# Patient Record
Sex: FEMALE | Race: WHITE | NOT HISPANIC OR LATINO | Employment: OTHER | URBAN - METROPOLITAN AREA
[De-identification: names, ages, dates, MRNs, and addresses within clinical notes are randomized per-mention and may not be internally consistent; named-entity substitution may affect disease eponyms.]

---

## 2017-02-07 ENCOUNTER — HISTORICAL (OUTPATIENT)
Dept: ADMINISTRATIVE | Facility: HOSPITAL | Age: 67
End: 2017-02-07

## 2017-03-08 LAB — ISTAT CREATININE: 0.9

## 2017-03-22 ENCOUNTER — HISTORICAL (OUTPATIENT)
Dept: ADMINISTRATIVE | Facility: HOSPITAL | Age: 67
End: 2017-03-22

## 2017-03-22 LAB
AMYLASE SERPL-CCNC: 78 U/L (ref 28–100)
BASOPHILS NFR BLD: 0.1 K/UL (ref 0–0.2)
BASOPHILS NFR BLD: 0.9 %
CALCIUM SERPL-MCNC: 9.1 MG/DL (ref 7.7–10.4)
CHLORIDE: 104 MMOL/L (ref 98–110)
CO2 SERPL-SCNC: 27 MMOL/L (ref 22.8–31.6)
EOSINOPHIL NFR BLD: 0.5 K/UL (ref 0–0.7)
EOSINOPHIL NFR BLD: 7.1 %
ERYTHROCYTE [DISTWIDTH] IN BLOOD BY AUTOMATED COUNT: 13 % (ref 11.7–14.9)
GLUCOSE: 95 MG/DL (ref 70–99)
GRAN #: 4.1 K/UL (ref 1.4–6.5)
GRAN%: 60.5 %
HCT VFR BLD AUTO: 42.3 % (ref 36–48)
HGB BLD-MCNC: 13.6 G/DL (ref 12–15)
IMMATURE GRANS (ABS): 0 K/UL (ref 0–1)
IMMATURE GRANULOCYTES: 0.3 %
LYMPH #: 1.6 K/UL (ref 1.2–3.4)
LYMPH%: 23.5 %
MCH RBC QN AUTO: 28.8 PG (ref 25–35)
MCHC RBC AUTO-ENTMCNC: 32.2 G/DL (ref 31–36)
MCV RBC AUTO: 89.6 FL (ref 79–98)
MONO #: 0.5 K/UL (ref 0.1–0.6)
MONO%: 7.7 %
NUCLEATED RBCS: 0 %
PLATELET # BLD AUTO: 281 K/UL (ref 140–440)
PMV BLD AUTO: 10.5 FL (ref 8.8–12.7)
POTASSIUM SERPL-SCNC: 4.3 MMOL/L (ref 3.5–5)
RBC # BLD AUTO: 4.72 M/UL (ref 3.5–5.5)
SODIUM: 140 MMOL/L (ref 134–144)
WBC # BLD AUTO: 6.7 K/UL (ref 5–10)

## 2017-05-11 ENCOUNTER — TELEPHONE (OUTPATIENT)
Dept: FAMILY MEDICINE | Facility: CLINIC | Age: 67
End: 2017-05-11

## 2017-05-16 ENCOUNTER — TELEPHONE (OUTPATIENT)
Dept: FAMILY MEDICINE | Facility: CLINIC | Age: 67
End: 2017-05-16

## 2017-06-15 RX ORDER — ROPINIROLE 2 MG/1
TABLET, FILM COATED ORAL
Qty: 60 TABLET | Refills: 2 | Status: SHIPPED | OUTPATIENT
Start: 2017-06-15 | End: 2017-08-01

## 2017-07-06 RX ORDER — CITALOPRAM 20 MG/1
TABLET, FILM COATED ORAL
Qty: 30 TABLET | Refills: 2 | Status: SHIPPED | OUTPATIENT
Start: 2017-07-06 | End: 2017-08-02

## 2017-07-06 RX ORDER — IBUPROFEN 600 MG/1
TABLET ORAL
Qty: 60 TABLET | Refills: 2 | Status: SHIPPED | OUTPATIENT
Start: 2017-07-06 | End: 2017-08-02 | Stop reason: ALTCHOICE

## 2017-08-01 RX ORDER — HYDROCODONE BITARTRATE AND ACETAMINOPHEN 5; 325 MG/1; MG/1
1 TABLET ORAL EVERY 12 HOURS PRN
COMMUNITY
Start: 2016-02-05 | End: 2017-08-02

## 2017-08-01 RX ORDER — ROPINIROLE 2 MG/1
2 TABLET, FILM COATED ORAL 2 TIMES DAILY
COMMUNITY
End: 2017-08-02 | Stop reason: DRUGHIGH

## 2017-08-01 RX ORDER — CYCLOBENZAPRINE HCL 5 MG
5 TABLET ORAL 3 TIMES DAILY PRN
COMMUNITY
End: 2017-09-11 | Stop reason: SDUPTHER

## 2017-08-02 ENCOUNTER — OFFICE VISIT (OUTPATIENT)
Dept: FAMILY MEDICINE | Facility: CLINIC | Age: 67
End: 2017-08-02
Payer: MEDICARE

## 2017-08-02 VITALS
DIASTOLIC BLOOD PRESSURE: 77 MMHG | BODY MASS INDEX: 30.59 KG/M2 | HEART RATE: 77 BPM | WEIGHT: 155.81 LBS | SYSTOLIC BLOOD PRESSURE: 129 MMHG | HEIGHT: 60 IN

## 2017-08-02 DIAGNOSIS — G25.81 RLS (RESTLESS LEGS SYNDROME): ICD-10-CM

## 2017-08-02 DIAGNOSIS — M72.2 PLANTAR FASCIITIS: Primary | ICD-10-CM

## 2017-08-02 DIAGNOSIS — R21 RASH AND NONSPECIFIC SKIN ERUPTION: ICD-10-CM

## 2017-08-02 PROBLEM — M51.26 HERNIATION OF LUMBAR INTERVERTEBRAL DISC WITHOUT MYELOPATHY: Status: ACTIVE | Noted: 2017-08-02

## 2017-08-02 PROBLEM — G47.00 INSOMNIA: Status: ACTIVE | Noted: 2017-08-02

## 2017-08-02 PROBLEM — N20.0 CALCULUS OF KIDNEY: Status: ACTIVE | Noted: 2017-08-02

## 2017-08-02 PROBLEM — M85.80 OSTEOPENIA: Status: ACTIVE | Noted: 2017-08-02

## 2017-08-02 PROBLEM — R19.00 ABDOMINAL MASS: Status: ACTIVE | Noted: 2017-08-02

## 2017-08-02 PROBLEM — K21.9 GASTROESOPHAGEAL REFLUX DISEASE: Status: ACTIVE | Noted: 2017-08-02

## 2017-08-02 PROBLEM — Z78.9 NON-SMOKER: Status: ACTIVE | Noted: 2017-08-02

## 2017-08-02 PROBLEM — E78.5 HYPERLIPIDEMIA: Status: ACTIVE | Noted: 2017-08-02

## 2017-08-02 PROCEDURE — 99213 OFFICE O/P EST LOW 20 MIN: CPT | Mod: ,,, | Performed by: INTERNAL MEDICINE

## 2017-08-02 PROCEDURE — 1159F MED LIST DOCD IN RCRD: CPT | Mod: ,,, | Performed by: INTERNAL MEDICINE

## 2017-08-02 RX ORDER — TRIAMCINOLONE ACETONIDE 5 MG/G
CREAM TOPICAL 2 TIMES DAILY
Qty: 30 G | Refills: 1 | Status: SHIPPED | OUTPATIENT
Start: 2017-08-02 | End: 2020-06-11 | Stop reason: SDUPTHER

## 2017-08-02 RX ORDER — MELOXICAM 15 MG/1
1 TABLET ORAL DAILY
COMMUNITY
Start: 2017-07-08 | End: 2017-08-02 | Stop reason: SDUPTHER

## 2017-08-02 RX ORDER — MELOXICAM 15 MG/1
15 TABLET ORAL DAILY
Qty: 30 TABLET | Refills: 1 | Status: SHIPPED | OUTPATIENT
Start: 2017-08-02 | End: 2017-11-28 | Stop reason: SDUPTHER

## 2017-08-02 RX ORDER — ROPINIROLE 4 MG/1
4 TABLET, FILM COATED ORAL 2 TIMES DAILY
Qty: 60 TABLET | Refills: 11 | Status: SHIPPED | OUTPATIENT
Start: 2017-08-02 | End: 2018-04-24 | Stop reason: SDUPTHER

## 2017-08-02 NOTE — PATIENT INSTRUCTIONS
Plantar Fasciitis  Plantar fasciitis is a painful swelling of the plantar fascia. The plantar fascia is a thick, fibrous layer of tissue. It covers the bones on the bottom of your foot. And it supports the foot bones in an arched position.  This can happen gradually or suddenly. It usually affects one foot at a time. Heel pain can be sharp, like a knife sticking into the bottom of your foot. You may feel pain after exercising, long-distance jogging, stair climbing, long periods of standing, or after standing up.  Risk factors include: non-active lifestyle, arthritis, diabetes, obesity or recent weight gain, flat foot, high arch. Wearing high heels, loose shoes, or shoes with poor arch support for long periods of time adds to the risk. This problem is commonly found in runners and dancers. It also found in people who stand on hard surfaces for long periods of time.  Foot pain from this condition is usually worse in the morning. But it improves with walking. By the end of the day there may be a dull aching. Treatment requires short-term rest and controlling swelling. It may take up to 9 months before all symptoms go away. Rarely, a steroid injection into the foot, or surgery, may be needed.  Home care  · If you are overweight, lose weight to help healing.  · Choose supportive shoes with good arch support and shock absorbency. Replace athletic shoes when they become worn out. Dont walk or run barefoot.  · Premade or custom-fitted shoe inserts may be helpful. Inserts made of silicone seem to be the most effective. Custom-made inserts can be provided by a podiatrist or foot specialist, physical therapist, or orthopedist.  · Premade or custom-made night splints keep the heel stretched out while you sleep. They may prevent morning pain.  · Avoid activities that stress the feet: jogging, prolonged standing or walking, contact sports, etc.  · First thing in the morning and before sports, stretch the bottom of your feet.  Gently flex your ankle so the toes move toward your knee.  · Icing may help control heel pain. Apply an ice pack to the heel for 10-20 minutes as a preventive. Or ice your heel after a severe flare-up of symptoms. You may repeat this every 1-2 hours as needed.  · You may use over-the-counter pain medicine to control pain, unless another medicine was prescribed. Anti-inflammatory pain medicines, such as ibuprofen or naproxen, may work better than acetaminophen. If you have chronic liver or kidney disease or ever had a stomach ulcer or GI bleeding, talk with your healthcare provider before using these medicines.  Follow-up care  Follow up with your healthcare provider, physical therapist, or podiatrist or foot specialist as advised.  Call for an appointment if pain worsens or there is no relief after a few weeks of home treatment. Shoe inserts, a night splint, or a special boot may be required.  If X-rays were taken, you will be told of any new findings that may affect your care.  When to seek medical advice  Call your healthcare provider right away if any of these occur:  · Foot swelling  · Redness with increasing pain  Date Last Reviewed: 11/21/2015  © 2453-7071 [employer]. [address] [place] [address]. All rights reserved. This information is not intended as a substitute for professional medical care. Always follow your healthcare professional's instructions.        Treating Plantar Fasciitis  First, your healthcare provider tries to determine the cause of your problem in order to suggest ways to relieve pain. If your pain is due to poor foot mechanics, custom-made shoe inserts (orthoses) may help.    Reduce symptoms  · To relieve mild symptoms, try aspirin, ibuprofen, or other medicines as directed. Rubbing ice on the affected area may also help.  · To reduce severe pain and swelling, your healthcare provider may prescribe pills or injections or a walking cast in some instances.  Physical therapy, such as ultrasound or a daily stretching program, may also be recommended. Surgery is rarely required.  · To reduce symptoms caused by poor foot mechanics, your foot may be taped. This supports the arch and temporarily controls movement. Night splints may also help by stretching the fascia.  Control movement  If taping helps, your healthcare provider may prescribe orthoses. Built from plaster casts of your feet, these inserts control the way your foot moves. As a result, your symptoms should go away.  Reduce overuse  Every time your foot strikes the ground, the plantar fascia is stretched. You can reduce the strain on the plantar fascia and the possibility of overuse by following these suggestions:  · Lose any excess weight.  · Avoid running on hard or uneven ground.  · Use orthoses at all times in your shoes and house slippers.  If surgery is needed  Your healthcare provider may consider surgery if other types of treatment don't control your pain. During surgery, the plantar fascia is partially cut to release tension. As you heal, fibrous tissue fills the space between the heel bone and the plantar fascia.   Date Last Reviewed: 10/14/2015  © 6533-9017 [employer]. [address] [place] [address], [place] [address]. All rights reserved. This information is not intended as a substitute for professional medical care. Always follow your healthcare professional's instructions.

## 2017-08-02 NOTE — PROGRESS NOTES
Subjective:       Patient ID: Vesta RIOS Cousin is a 66 y.o. female.    Chief Complaint: Plantar Fasciitis    Mrs. Cruz is a 66-year-old  female who comes for evaluation. She complains of pain in the left heel. She is known to have plantar's fasciitis. She has tried some treatment in past without much relief. She recently went to urgent care center and was prescribed meloxicam with some relief. She also had an x-ray done which had shown heel spur  but no evidence of fracture or any other abnormality.   Patient also has chronic restless leg leg symptoms. She takes Requip 2 mg twice a day without much relief. She is wondering if dosage can be increased. Thus far she has not been diagnosed with iron deficiency anemia or diminished ferritin levels. It is unclear if she has sleep apnea.    Patient also seems to have a rash in the left upper extremity on the dorsum. She states that she was exposed to poison ivy 3-4 months ago and after that the rash has not gone away. She has intermittent itching. No fever. No similar rash elsewhere in the body. There are no pustules.          Foot Injury    The pain is present in the left foot. The quality of the pain is described as shooting and burning. The pain is at a severity of 4/10. The pain is moderate. The pain has been intermittent since onset. Associated symptoms include an inability to bear weight. The symptoms are aggravated by weight bearing. She has tried NSAIDs for the symptoms.   Rash   This is a new problem. The current episode started more than 1 month ago. The problem is unchanged. The affected locations include the left arm. The rash is characterized by redness and itchiness. She was exposed to plant contact. Pertinent negatives include no congestion, cough, diarrhea, eye pain, fatigue, fever or shortness of breath. Past treatments include topical steroids (Over-the-counter medications). The treatment provided no relief. There is no history of allergies,  asthma, eczema or varicella.       Past Medical History:   Diagnosis Date    Abdominal pain, acute     Abdominal wall mass of epigastric region     Acid reflux     Acute low back pain     Annual physical exam     Anxiety     Bilateral shoulder pain     Body mass index (bmi) 31.0-31.9, adult     Cervical spondylosis     Chronic right shoulder pain     Decreased GFR     Hearing aid worn     bilateral    History of ulcerative colitis     HNP (herniated nucleus pulposus), lumbar     Hyperlipidemia     Insomnia     Kidney stone     Left-sided low back pain without sciatica     Non-smoker     Osteoarthritis     Osteoarthritis of left glenohumeral joint     Osteopenia     Osteoporosis screening     Palpable abdominal mass     Peripheral neuropathy     Plantar fasciitis of left foot     RLS (restless legs syndrome)     Screening for colorectal cancer     Tear of right rotator cuff     Visit for screening mammogram      Social History     Social History    Marital status:      Spouse name: N/A    Number of children: N/A    Years of education: N/A     Occupational History    Not on file.     Social History Main Topics    Smoking status: Never Smoker    Smokeless tobacco: Never Used    Alcohol use Yes      Comment: sociallyDimas staley    Drug use: No    Sexual activity: Yes     Partners: Male     Other Topics Concern    Not on file     Social History Narrative        2 sons    Works part time at [employer]     Past Surgical History:   Procedure Laterality Date    CHOLECYSTECTOMY      COLON SURGERY      HEEL SPUR SURGERY      JOINT REPLACEMENT Bilateral     hip    partial colectomy      for ulcerative colitis    SHOULDER SURGERY  05/2016    TONSILLECTOMY      total right hip replacement       No family history on file.    Review of Systems   Constitutional: Negative for activity change, chills, fatigue, fever and unexpected weight change.   HENT: Negative for  congestion, postnasal drip and sinus pressure.    Eyes: Negative for pain, discharge and visual disturbance.   Respiratory: Negative for cough, chest tightness and shortness of breath.    Cardiovascular: Negative for chest pain, palpitations and leg swelling.   Gastrointestinal: Negative for abdominal distention, anal bleeding, constipation and diarrhea.   Genitourinary: Negative for difficulty urinating, dysuria, flank pain, frequency, menstrual problem, pelvic pain, vaginal bleeding and vaginal pain.   Musculoskeletal: Positive for arthralgias and myalgias. Negative for joint swelling.        Left heel pain   Skin: Positive for rash (left arm). Negative for color change and pallor.   Allergic/Immunologic: Negative for environmental allergies, food allergies and immunocompromised state.   Neurological: Negative for dizziness, tremors, seizures, syncope, light-headedness and headaches.        RLS   Hematological: Negative for adenopathy. Does not bruise/bleed easily.   Psychiatric/Behavioral: Negative for agitation, confusion and dysphoric mood. The patient is not nervous/anxious.        Objective:      Physical Exam   Constitutional: She is oriented to person, place, and time. She appears well-developed and well-nourished. She is cooperative. No distress.   HENT:   Head: Normocephalic and atraumatic.   Right Ear: Tympanic membrane normal.   Left Ear: Tympanic membrane normal.   Eyes: Conjunctivae, EOM and lids are normal. Pupils are equal, round, and reactive to light. Lids are everted and swept, no foreign bodies found. Right pupil is round and reactive. Left pupil is round and reactive.   Neck: Trachea normal and normal range of motion. Neck supple.   Cardiovascular: Normal rate, regular rhythm, S1 normal, S2 normal, normal heart sounds and intact distal pulses.    Pulmonary/Chest: Breath sounds normal.   Abdominal: Soft. Bowel sounds are normal. There is no rigidity and no guarding.   Musculoskeletal: Normal  range of motion.        Arms:  Lymphadenopathy:     She has no cervical adenopathy.     She has no axillary adenopathy.   Neurological: She is alert and oriented to person, place, and time.   Skin: Skin is warm and dry. Capillary refill takes less than 2 seconds.   Psychiatric: She has a normal mood and affect. Her behavior is normal. Judgment and thought content normal.   Nursing note and vitals reviewed.      Assessment:       1. Plantar fasciitis    2. RLS (restless legs syndrome)    3. Rash and nonspecific skin eruption         Plan:           Plantar fasciitis  -     meloxicam (MOBIC) 15 MG tablet; Take 1 tablet (15 mg total) by mouth Daily.  Dispense: 30 tablet; Refill: 1    RLS (restless legs syndrome)  -     ropinirole (REQUIP) 4 MG tablet; Take 1 tablet (4 mg total) by mouth 2 (two) times daily.  Dispense: 60 tablet; Refill: 11    Rash and nonspecific skin eruption  -     triamcinolone acetonide 0.5% (KENALOG) 0.5 % Crea; Apply topically 2 (two) times daily.  Dispense: 30 g; Refill: 1    Patient will be empirically started on triamcinolone cream for the rash and itching on the left arm which apparently started as poison ivy as per the patient.    Differential diagnosis would include conditions like allergic dermatitis/contact dermatitis/folliculitis/porphyria cutanea tarda.    May consider dermatology evaluation eventually. Follow-up in 1 month for further eval

## 2017-08-23 RX ORDER — DIAZEPAM 10 MG/1
10 TABLET ORAL
COMMUNITY
End: 2018-04-24 | Stop reason: ALTCHOICE

## 2017-08-23 RX ORDER — CITALOPRAM 20 MG/1
20 TABLET, FILM COATED ORAL DAILY
COMMUNITY
End: 2017-10-16 | Stop reason: SDUPTHER

## 2017-08-23 RX ORDER — HYDROCODONE BITARTRATE AND ACETAMINOPHEN 5; 325 MG/1; MG/1
1 TABLET ORAL EVERY 6 HOURS PRN
COMMUNITY
End: 2018-04-24 | Stop reason: ALTCHOICE

## 2017-09-12 RX ORDER — CYCLOBENZAPRINE HCL 5 MG
TABLET ORAL
Qty: 60 TABLET | Refills: 2 | Status: SHIPPED | OUTPATIENT
Start: 2017-09-12 | End: 2019-06-11

## 2017-09-12 RX ORDER — TRAZODONE HYDROCHLORIDE 50 MG/1
50 TABLET ORAL NIGHTLY
Qty: 30 TABLET | Refills: 2 | Status: CANCELLED | OUTPATIENT
Start: 2017-09-12

## 2017-09-12 RX ORDER — CYCLOBENZAPRINE HCL 5 MG
5 TABLET ORAL 3 TIMES DAILY PRN
Qty: 60 TABLET | Refills: 2 | Status: CANCELLED | OUTPATIENT
Start: 2017-09-12

## 2017-09-12 RX ORDER — TRAZODONE HYDROCHLORIDE 50 MG/1
TABLET ORAL
Qty: 30 TABLET | Refills: 2 | Status: SHIPPED | OUTPATIENT
Start: 2017-09-12 | End: 2018-01-17 | Stop reason: SDUPTHER

## 2017-09-19 ENCOUNTER — TELEPHONE (OUTPATIENT)
Dept: FAMILY MEDICINE | Facility: CLINIC | Age: 67
End: 2017-09-19

## 2017-09-21 DIAGNOSIS — H91.93 BILATERAL HEARING LOSS, UNSPECIFIED HEARING LOSS TYPE: Primary | ICD-10-CM

## 2017-09-21 NOTE — PROGRESS NOTES
[facility]  And need for Cochlear transplant.

## 2017-10-16 RX ORDER — CITALOPRAM 20 MG/1
20 TABLET, FILM COATED ORAL DAILY
Qty: 30 TABLET | Refills: 2 | Status: SHIPPED | OUTPATIENT
Start: 2017-10-16 | End: 2018-01-15 | Stop reason: SDUPTHER

## 2017-10-16 RX ORDER — OMEPRAZOLE 20 MG/1
20 CAPSULE, DELAYED RELEASE ORAL NIGHTLY
Qty: 30 CAPSULE | Refills: 2 | Status: SHIPPED | OUTPATIENT
Start: 2017-10-16 | End: 2018-04-23 | Stop reason: SDUPTHER

## 2017-11-28 DIAGNOSIS — M72.2 PLANTAR FASCIITIS: ICD-10-CM

## 2017-11-28 RX ORDER — MELOXICAM 15 MG/1
TABLET ORAL
Qty: 30 TABLET | Refills: 1 | Status: SHIPPED | OUTPATIENT
Start: 2017-11-28 | End: 2018-01-15 | Stop reason: SDUPTHER

## 2017-12-30 RX ORDER — CYCLOBENZAPRINE HCL 5 MG
TABLET ORAL
Qty: 60 TABLET | Refills: 2 | OUTPATIENT
Start: 2017-12-30

## 2017-12-30 RX ORDER — TRAZODONE HYDROCHLORIDE 50 MG/1
TABLET ORAL
Qty: 30 TABLET | Refills: 2 | OUTPATIENT
Start: 2017-12-30

## 2018-01-03 RX ORDER — IBUPROFEN 600 MG/1
600 TABLET ORAL 2 TIMES DAILY PRN
Qty: 30 TABLET | Refills: 2 | Status: SHIPPED | OUTPATIENT
Start: 2018-01-03 | End: 2018-01-17 | Stop reason: ALTCHOICE

## 2018-01-15 DIAGNOSIS — G25.81 RLS (RESTLESS LEGS SYNDROME): ICD-10-CM

## 2018-01-15 DIAGNOSIS — M72.2 PLANTAR FASCIITIS: ICD-10-CM

## 2018-01-16 RX ORDER — TRAZODONE HYDROCHLORIDE 50 MG/1
TABLET ORAL
Qty: 30 TABLET | Refills: 2 | OUTPATIENT
Start: 2018-01-16

## 2018-01-16 RX ORDER — CYCLOBENZAPRINE HCL 5 MG
TABLET ORAL
Qty: 60 TABLET | Refills: 2 | OUTPATIENT
Start: 2018-01-16

## 2018-01-16 RX ORDER — IBUPROFEN 600 MG/1
600 TABLET ORAL 2 TIMES DAILY PRN
Qty: 30 TABLET | Refills: 2 | OUTPATIENT
Start: 2018-01-16

## 2018-01-16 RX ORDER — ROPINIROLE 4 MG/1
4 TABLET, FILM COATED ORAL 2 TIMES DAILY
Qty: 60 TABLET | Refills: 11 | OUTPATIENT
Start: 2018-01-16 | End: 2019-01-16

## 2018-01-17 RX ORDER — TRAZODONE HYDROCHLORIDE 50 MG/1
50 TABLET ORAL NIGHTLY
Qty: 30 TABLET | Refills: 2 | Status: SHIPPED | OUTPATIENT
Start: 2018-01-17 | End: 2018-04-23 | Stop reason: SDUPTHER

## 2018-01-17 RX ORDER — CITALOPRAM 20 MG/1
TABLET, FILM COATED ORAL
Qty: 30 TABLET | Refills: 2 | Status: SHIPPED | OUTPATIENT
Start: 2018-01-17 | End: 2018-01-29 | Stop reason: SDUPTHER

## 2018-01-17 RX ORDER — CYCLOBENZAPRINE HCL 5 MG
5 TABLET ORAL 3 TIMES DAILY PRN
Qty: 60 TABLET | Refills: 2 | OUTPATIENT
Start: 2018-01-17

## 2018-01-17 RX ORDER — MELOXICAM 15 MG/1
TABLET ORAL
Qty: 30 TABLET | Refills: 1 | Status: SHIPPED | OUTPATIENT
Start: 2018-01-17 | End: 2018-06-11 | Stop reason: SDUPTHER

## 2018-01-29 RX ORDER — CYCLOBENZAPRINE HCL 5 MG
TABLET ORAL
Qty: 60 TABLET | Refills: 2 | OUTPATIENT
Start: 2018-01-29

## 2018-01-29 RX ORDER — CITALOPRAM 20 MG/1
20 TABLET, FILM COATED ORAL DAILY
Qty: 30 TABLET | Refills: 2 | Status: SHIPPED | OUTPATIENT
Start: 2018-01-29 | End: 2018-04-24 | Stop reason: SDUPTHER

## 2018-03-19 ENCOUNTER — TELEPHONE (OUTPATIENT)
Dept: FAMILY MEDICINE | Facility: CLINIC | Age: 68
End: 2018-03-19

## 2018-03-19 DIAGNOSIS — H91.93 BILATERAL HEARING LOSS, UNSPECIFIED HEARING LOSS TYPE: Primary | ICD-10-CM

## 2018-04-23 RX ORDER — TRAZODONE HYDROCHLORIDE 50 MG/1
TABLET ORAL
Qty: 30 TABLET | Refills: 2 | Status: SHIPPED | OUTPATIENT
Start: 2018-04-23 | End: 2018-04-24 | Stop reason: SDUPTHER

## 2018-04-23 RX ORDER — OMEPRAZOLE 20 MG/1
CAPSULE, DELAYED RELEASE ORAL
Qty: 30 CAPSULE | Refills: 2 | Status: SHIPPED | OUTPATIENT
Start: 2018-04-23 | End: 2018-04-24 | Stop reason: SDUPTHER

## 2018-04-24 ENCOUNTER — OFFICE VISIT (OUTPATIENT)
Dept: FAMILY MEDICINE | Facility: CLINIC | Age: 68
End: 2018-04-24
Payer: MEDICARE

## 2018-04-24 VITALS
HEIGHT: 60 IN | HEART RATE: 76 BPM | WEIGHT: 144 LBS | SYSTOLIC BLOOD PRESSURE: 122 MMHG | DIASTOLIC BLOOD PRESSURE: 67 MMHG | BODY MASS INDEX: 28.27 KG/M2

## 2018-04-24 DIAGNOSIS — G25.81 RLS (RESTLESS LEGS SYNDROME): ICD-10-CM

## 2018-04-24 DIAGNOSIS — M19.049 ARTHRITIS OF HAND: ICD-10-CM

## 2018-04-24 DIAGNOSIS — Z78.0 ASYMPTOMATIC MENOPAUSE: ICD-10-CM

## 2018-04-24 DIAGNOSIS — F51.04 PSYCHOPHYSIOLOGICAL INSOMNIA: ICD-10-CM

## 2018-04-24 DIAGNOSIS — Z86.39 HISTORY OF VITAMIN D DEFICIENCY: ICD-10-CM

## 2018-04-24 DIAGNOSIS — Z11.59 NEED FOR HEPATITIS C SCREENING TEST: ICD-10-CM

## 2018-04-24 DIAGNOSIS — F41.9 ANXIETY: Primary | ICD-10-CM

## 2018-04-24 DIAGNOSIS — K21.9 GASTROESOPHAGEAL REFLUX DISEASE WITHOUT ESOPHAGITIS: ICD-10-CM

## 2018-04-24 LAB
ALBUMIN SERPL-MCNC: 4.4 G/DL (ref 3.1–4.7)
ALP SERPL-CCNC: 86 IU/L (ref 40–104)
ALT (SGPT): 17 IU/L (ref 3–33)
AST SERPL-CCNC: 19 IU/L (ref 10–40)
BASOPHILS NFR BLD: 0.1 K/UL (ref 0–0.2)
BASOPHILS NFR BLD: 1.1 %
BILIRUB SERPL-MCNC: 0.5 MG/DL (ref 0.3–1)
BUN SERPL-MCNC: 23 MG/DL (ref 8–20)
CALCIUM SERPL-MCNC: 9.3 MG/DL (ref 7.7–10.4)
CHLORIDE: 104 MMOL/L (ref 98–110)
CO2 SERPL-SCNC: 25.3 MMOL/L (ref 22.8–31.6)
CREATININE: 0.75 MG/DL (ref 0.6–1.4)
CRP SERPL-MCNC: 0.19 MG/DL (ref 0–1.4)
EOSINOPHIL NFR BLD: 0.6 K/UL (ref 0–0.7)
EOSINOPHIL NFR BLD: 7.4 %
ERYTHROCYTE [DISTWIDTH] IN BLOOD BY AUTOMATED COUNT: 13.2 % (ref 11.7–14.9)
FERRITIN SERPL-MCNC: 63 NG/ML (ref 24–162)
GLUCOSE: 99 MG/DL (ref 70–99)
GRAN #: 4.6 K/UL (ref 1.4–6.5)
GRAN%: 58.4 %
HCT VFR BLD AUTO: 43.6 % (ref 36–48)
HGB BLD-MCNC: 14 G/DL (ref 12–15)
IMMATURE GRANS (ABS): 0 K/UL (ref 0–1)
IMMATURE GRANULOCYTES: 0.3 %
LYMPH #: 1.8 K/UL (ref 1.2–3.4)
LYMPH%: 22.7 %
MCH RBC QN AUTO: 29.1 PG (ref 25–35)
MCHC RBC AUTO-ENTMCNC: 32.1 G/DL (ref 31–36)
MCV RBC AUTO: 90.6 FL (ref 79–98)
MONO #: 0.8 K/UL (ref 0.1–0.6)
MONO%: 10.1 %
NUCLEATED RBCS: 0 %
PLATELET # BLD AUTO: 275 K/UL (ref 140–440)
PMV BLD AUTO: 10.7 FL (ref 8.8–12.7)
POTASSIUM SERPL-SCNC: 4.4 MMOL/L (ref 3.5–5)
PROT SERPL-MCNC: 7.6 G/DL (ref 6–8.2)
RBC # BLD AUTO: 4.81 M/UL (ref 3.5–5.5)
SODIUM: 137 MMOL/L (ref 134–144)
WBC # BLD AUTO: 7.9 K/UL (ref 5–10)

## 2018-04-24 PROCEDURE — 99214 OFFICE O/P EST MOD 30 MIN: CPT | Mod: ,,, | Performed by: INTERNAL MEDICINE

## 2018-04-24 RX ORDER — CITALOPRAM 20 MG/1
20 TABLET, FILM COATED ORAL DAILY
Qty: 30 TABLET | Refills: 2 | Status: SHIPPED | OUTPATIENT
Start: 2018-04-24 | End: 2018-11-26 | Stop reason: SDUPTHER

## 2018-04-24 RX ORDER — OMEPRAZOLE 20 MG/1
20 CAPSULE, DELAYED RELEASE ORAL NIGHTLY
Qty: 30 CAPSULE | Refills: 2 | Status: SHIPPED | OUTPATIENT
Start: 2018-04-24 | End: 2018-11-26 | Stop reason: SDUPTHER

## 2018-04-24 RX ORDER — ROPINIROLE 4 MG/1
4 TABLET, FILM COATED ORAL 2 TIMES DAILY
Qty: 60 TABLET | Refills: 11 | Status: SHIPPED | OUTPATIENT
Start: 2018-04-24 | End: 2019-03-14 | Stop reason: SDUPTHER

## 2018-04-24 RX ORDER — TRAZODONE HYDROCHLORIDE 50 MG/1
25 TABLET ORAL NIGHTLY
Qty: 15 TABLET | Refills: 2 | Status: SHIPPED | OUTPATIENT
Start: 2018-04-24 | End: 2018-08-21 | Stop reason: SDUPTHER

## 2018-04-24 NOTE — PROGRESS NOTES
Subjective:       Patient ID: Vesta RIOS Cousin is a 67 y.o. female.    Chief Complaint: Anxiety; Back Pain; and Knee Pain    Ms. Lemons seems to be a poor historian today. She mumbles something about having pain in the hand in between the second and third digits. Difficult to understand when it started or if there was any history of trauma.    Patient's multiple medical issues also have been reviewed including problems with sleep and restless leg syndrome.  Difficult to get a full handle and gauge on this patient who seems to be nonparticipant today. I've reviewed her multiple medications and I'm not sure which medication she is exactly taking and which she is not taking. She seems to be somewhat fuzzy and flat.    Medications include diazepam, Requip, citalopram, trazodone, Flexeril and she also mentions that she took a gabapentin borrowed from her . She states that none of the medications help her with pain. (I'm seeing a prescription for meloxicam and I'm not sure if she is taking it or not). Currently she is not taking hydrocodone. (And this medication has been deleted from the active database.)      Anxiety   Presents for follow-up visit. Symptoms include insomnia. Patient reports no chest pain, confusion, dizziness, nervous/anxious behavior, palpitations, panic or shortness of breath. Symptoms occur most days. The quality of sleep is fair.       Back Pain   This is a chronic problem. The current episode started more than 1 year ago. Pertinent negatives include no chest pain, dysuria, fever or headaches. The treatment provided mild relief.   Knee Pain    The incident occurred more than 1 week ago. There was no injury mechanism. The treatment provided no relief.   Hand Pain    The incident occurred more than 1 week ago. There was no injury mechanism. The pain is present in the right hand. The quality of the pain is described as burning. The pain does not radiate. The pain is at a severity of 3/10. Pertinent  negatives include no chest pain. The treatment provided no relief.       Past Medical History:   Diagnosis Date    Abdominal pain, acute     Abdominal wall mass of epigastric region     Acid reflux     Acute low back pain     Annual physical exam     Anxiety     Bilateral shoulder pain     Body mass index (bmi) 31.0-31.9, adult     Cervical spondylosis     Chronic right shoulder pain     Decreased GFR     Depression     Hearing aid worn     bilateral    History of ulcerative colitis     History of vitamin D deficiency 4/24/2018    HNP (herniated nucleus pulposus), lumbar     Hyperlipidemia     Insomnia     Kidney stone     Left-sided low back pain without sciatica     Non-smoker     Osteoarthritis     Osteoarthritis of left glenohumeral joint     Osteopenia     Osteoporosis screening     Palpable abdominal mass     Peripheral neuropathy     Plantar fasciitis of left foot     RLS (restless legs syndrome)     Screening for colorectal cancer     Tear of right rotator cuff     Visit for screening mammogram      Social History     Social History    Marital status:      Spouse name: N/A    Number of children: 2    Years of education: N/A     Occupational History    Not on file.     Social History Main Topics    Smoking status: Never Smoker    Smokeless tobacco: Never Used    Alcohol use Yes      Comment: [email or web address]. [email or web address]    Drug use: No    Sexual activity: Yes     Partners: Male     Other Topics Concern    Not on file     Social History Narrative        2 sons    Works part time at department store     Past Surgical History:   Procedure Laterality Date    CHOLECYSTECTOMY      COLON SURGERY      HEEL SPUR SURGERY      JOINT REPLACEMENT Bilateral     hip    partial colectomy      for ulcerative colitis    SHOULDER SURGERY  05/2016    TONSILLECTOMY      total right hip replacement       Family History   Problem Relation Age of Onset    Arthritis Father      Heart disease Father        Review of Systems   Constitutional: Negative for chills, fatigue and fever.        Difficult historian   HENT: Negative for congestion, postnasal drip and sinus pressure.    Eyes: Negative for pain, discharge and visual disturbance.   Respiratory: Negative for cough, chest tightness and shortness of breath.    Cardiovascular: Negative for chest pain, palpitations and leg swelling.   Gastrointestinal: Negative for abdominal distention, anal bleeding, constipation and diarrhea.   Genitourinary: Negative for dysuria.   Musculoskeletal: Positive for arthralgias, back pain and myalgias. Negative for joint swelling.        Left heel pain   Skin: Negative for color change and pallor.   Allergic/Immunologic: Negative for environmental allergies, food allergies and immunocompromised state.   Neurological: Negative for dizziness, seizures, light-headedness and headaches.        RLS-takes generic requip   Hematological: Negative for adenopathy. Does not bruise/bleed easily.   Psychiatric/Behavioral: Positive for sleep disturbance. Negative for agitation, confusion and dysphoric mood. The patient has insomnia. The patient is not nervous/anxious.        Objective:       Vitals:    04/24/18 0929   BP: 122/67   Pulse: 76   Weight: 65.3 kg (144 lb)   Height: 5' (1.524 m)     Physical Exam   Constitutional: She appears well-developed. She is cooperative. No distress.   HENT:   Head: Normocephalic and atraumatic.   Eyes: Conjunctivae, EOM and lids are normal. Pupils are equal, round, and reactive to light. Lids are everted and swept, no foreign bodies found. Right pupil is round and reactive. Left pupil is round and reactive.   Neck: Trachea normal and normal range of motion. Neck supple. No thyromegaly present.   Cardiovascular: Normal rate, regular rhythm, S1 normal, S2 normal and normal heart sounds.    Pulmonary/Chest: Breath sounds normal.   Abdominal: Soft. Bowel sounds are normal. There is no  rigidity and no guarding.   Musculoskeletal: She exhibits no deformity. Tenderness: right kneeon range of motion and right hand.        Arms:       Hands:  Lymphadenopathy:     She has no cervical adenopathy.   Neurological: She is alert.   Skin: Skin is warm and dry.   Psychiatric: She is not actively hallucinating. Cognition and memory are impaired.   Rather flat affect. Soft-spoken. Has trouble comprehending my questions and answering them. She is inattentive.   Nursing note and vitals reviewed.      Assessment:       1. Anxiety    2. Gastroesophageal reflux disease without esophagitis    3. RLS (restless legs syndrome)    4. Psychophysiological insomnia    5. Need for hepatitis C screening test    6. Asymptomatic menopause    7. History of vitamin D deficiency    8. Arthritis of hand         Plan:           Anxiety  -     citalopram (CELEXA) 20 MG tablet; Take 1 tablet (20 mg total) by mouth once daily.  Dispense: 30 tablet; Refill: 2    Gastroesophageal reflux disease without esophagitis  -     omeprazole (PRILOSEC) 20 MG capsule; Take 1 capsule (20 mg total) by mouth every evening.  Dispense: 30 capsule; Refill: 2    RLS (restless legs syndrome)  -     rOPINIRole (REQUIP) 4 MG tablet; Take 1 tablet (4 mg total) by mouth 2 (two) times daily.  Dispense: 60 tablet; Refill: 11  -     Ferritin; Future; Expected date: 04/24/2018    Psychophysiological insomnia  -     traZODone (DESYREL) 50 MG tablet; Take 0.5 tablets (25 mg total) by mouth every evening.  Dispense: 15 tablet; Refill: 2    Need for hepatitis C screening test  -     Hepatitis C antibody; Future; Expected date: 04/24/2018    Asymptomatic menopause  -     DXA Bone Density Spine And Hip    History of vitamin D deficiency    Arthritis of hand  -     CBC auto differential; Future; Expected date: 04/24/2018  -     Comprehensive metabolic panel; Future; Expected date: 04/24/2018  -     C-reactive protein; Future; Expected date: 04/24/2018    I'm unable to  pinpoint as to why patient appears to be flat and having trouble understanding my questions.    I did review her medications extensively and I feel that she is overmedicated. Some of these are Legacy medications from other sources. I'm trying to streamline her medications and will refill trazodone at half a dose and continue with citalopram. She considers requip to be very important for her restless leg.    I've advised her not to borrow any medications from her  and make a complete list of her medications.      I'll check her labs at this point and see if we are missing anything as far as blood counts, chemistry, C-reactive protein and ferritin levels are concerned.    Screening for hepatitis C will also be added to present labs and collections.    She is also due for bone density.    She wants to defer the mammogram for the next year.      I'm not sure about her support system at home and whom to call.    Patient will be given a follow-up in 6 weeks or earlier based upon the labs.    I did offer her to see orthopedics for hand and knee pains-but she gave me a puzzled look.    I did review Patient's   data and it is not very remarkable for any significant controlled medication use. There has been some hydrocodone and tramadol prescription in past.

## 2018-04-24 NOTE — PATIENT INSTRUCTIONS
Tips to Control Acid Reflux    To control acid reflux, youll need to make some basic diet and lifestyle changes. The simple steps outlined below may be all youll need to ease discomfort.  Watch what you eat  · Avoid fatty foods and spicy foods.  · Eat fewer acidic foods, such as citrus and tomato-based foods. These can increase symptoms.  · Limit drinking alcohol, caffeine, and fizzy beverages. All increase acid reflux.  · Try limiting chocolate, peppermint, and spearmint. These can worsen acid reflux in some people.  Watch when you eat  · Avoid lying down for 3 hours after eating.  · Do not snack before going to bed.  Raise your head  Raising your head and upper body by 4 to 6 inches helps limit reflux when youre lying down. Put blocks under the head of your bed frame to raise it.  Other changes  · Lose weight, if you need to  · Dont exercise near bedtime  · Avoid tight-fitting clothes  · Limit aspirin and ibuprofen  · Stop smoking   Date Last Reviewed: 7/1/2016 © 2000-2017 [employer]. [address] [place] [address], [place] [address]. All rights reserved. This information is not intended as a substitute for professional medical care. Always follow your healthcare professional's instructions.        What is Arthritis?  Arthritis is a disease that affects the joints (the parts where bones meet and move). It can affect any joint in your body. There are many types of arthritis, including osteoarthritis and rheumatoid arthrtitis. If your symptoms are mild, medications may be enough to reduce pain and swelling. For more severe arthritis, surgery may be needed to improve the condition of the joint or replace the joint entirely.                  What causes arthritis?  Cartilage is a smooth substance that protects the ends of your bones and provides cushioning. When you have arthritis, this cartilage breaks down and can no longer protect your bones. The bones rub against each other, causing pain and  swelling. Over time, bone spurs (small pieces of rough or splintered bone) may develop, and the joint's range of motion can become limited.  Symptoms  Some of the more common symptoms of arthritis include:  · Joint pain and stiffness. Pain and stiffness get worse with long periods of rest or using a joint too long or too hard.  · Joints that have lost normal shape and motion.  · Tender, inflamed joints. They may look red and feel warm.  · Grinding or popping noise with joint movement.   · Feeling tired all the time.  Reducing symptoms  Following a healthy lifestyle by losing weight and exercising can help reduce symptoms of osteoarthritis. Medicines can be very helpful for arthritis.     Date Last Reviewed: 9/10/2015  © 4236-8780 [employer]. [address] [place] [address], [place] [address]. All rights reserved. This information is not intended as a substitute for professional medical care. Always follow your healthcare professional's instructions.

## 2018-04-26 LAB — HCV AB SERPL QL IA: <0.1 S/CO RATIO (ref 0–0.9)

## 2018-04-27 ENCOUNTER — TELEPHONE (OUTPATIENT)
Dept: FAMILY MEDICINE | Facility: CLINIC | Age: 68
End: 2018-04-27

## 2018-04-27 NOTE — TELEPHONE ENCOUNTER
----- Message from Jesus Colon MD sent at 4/26/2018  5:13 PM CDT -----  Notify patient that her hepatitis C screening was negative.

## 2018-05-01 ENCOUNTER — TELEPHONE (OUTPATIENT)
Dept: FAMILY MEDICINE | Facility: CLINIC | Age: 68
End: 2018-05-01

## 2018-05-01 DIAGNOSIS — M85.89 OSTEOPENIA OF MULTIPLE SITES: Primary | ICD-10-CM

## 2018-05-01 NOTE — TELEPHONE ENCOUNTER
----- Message from Jesus Colon MD sent at 5/1/2018  9:49 AM CDT -----  Bone density shows osteopenia. She should take  Caltrate with vitamin D3 one tablet twice a day and exercises. I've sent a prescription for Caltrate (OTC) the pharmacy.

## 2018-05-01 NOTE — PROGRESS NOTES
Bone density shows osteopenia at the hip and lumbar spine. Will start patient on Caltrate.  Exercise will be helpful.

## 2018-06-11 DIAGNOSIS — M72.2 PLANTAR FASCIITIS: ICD-10-CM

## 2018-06-11 RX ORDER — MELOXICAM 15 MG/1
TABLET ORAL
Qty: 30 TABLET | Refills: 1 | Status: SHIPPED | OUTPATIENT
Start: 2018-06-11 | End: 2018-08-21 | Stop reason: SDUPTHER

## 2018-08-21 DIAGNOSIS — M72.2 PLANTAR FASCIITIS: ICD-10-CM

## 2018-08-21 RX ORDER — TRAZODONE HYDROCHLORIDE 50 MG/1
TABLET ORAL
Qty: 30 TABLET | Refills: 2 | Status: SHIPPED | OUTPATIENT
Start: 2018-08-21 | End: 2018-11-26 | Stop reason: SDUPTHER

## 2018-08-21 RX ORDER — MELOXICAM 15 MG/1
TABLET ORAL
Qty: 30 TABLET | Refills: 1 | Status: SHIPPED | OUTPATIENT
Start: 2018-08-21 | End: 2018-12-31 | Stop reason: SDUPTHER

## 2018-09-19 ENCOUNTER — OFFICE VISIT (OUTPATIENT)
Dept: FAMILY MEDICINE | Facility: CLINIC | Age: 68
End: 2018-09-19
Payer: MEDICARE

## 2018-09-19 VITALS
DIASTOLIC BLOOD PRESSURE: 70 MMHG | SYSTOLIC BLOOD PRESSURE: 132 MMHG | OXYGEN SATURATION: 97 % | HEIGHT: 60 IN | WEIGHT: 142.38 LBS | HEART RATE: 71 BPM | BODY MASS INDEX: 27.95 KG/M2

## 2018-09-19 DIAGNOSIS — L30.9 DERMATITIS: ICD-10-CM

## 2018-09-19 DIAGNOSIS — M79.641 BILATERAL HAND PAIN: ICD-10-CM
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
## 2018-09-19 DIAGNOSIS — L29.9 PRURITUS: ICD-10-CM

## 2018-09-19 DIAGNOSIS — M19.90 ARTHRITIS: ICD-10-CM

## 2018-09-19 DIAGNOSIS — M79.642 BILATERAL HAND PAIN: ICD-10-CM

## 2018-09-19 DIAGNOSIS — S49.91XA INJURY OF RIGHT UPPER EXTREMITY, INITIAL ENCOUNTER: ICD-10-CM

## 2018-09-19 PROCEDURE — 99214 OFFICE O/P EST MOD 30 MIN: CPT | Mod: ,,, | Performed by: NURSE PRACTITIONER

## 2018-09-19 RX ORDER — CETIRIZINE HYDROCHLORIDE 10 MG/1
10 TABLET ORAL DAILY
Qty: 30 TABLET | Refills: 3 | Status: SHIPPED | OUTPATIENT
Start: 2018-09-19 | End: 2019-01-26 | Stop reason: SDUPTHER

## 2018-09-19 NOTE — PATIENT INSTRUCTIONS
What is Arthritis?  Arthritis is a disease that affects the joints (the parts where bones meet and move). It can affect any joint in your body. There are many types of arthritis, including osteoarthritis and rheumatoid arthrtitis. If your symptoms are mild, medications may be enough to reduce pain and swelling. For more severe arthritis, surgery may be needed to improve the condition of the joint or replace the joint entirely.                  What causes arthritis?  Cartilage is a smooth substance that protects the ends of your bones and provides cushioning. When you have arthritis, this cartilage breaks down and can no longer protect your bones. The bones rub against each other, causing pain and swelling. Over time, bone spurs (small pieces of rough or splintered bone) may develop, and the joint's range of motion can become limited.  Symptoms  Some of the more common symptoms of arthritis include:  · Joint pain and stiffness. Pain and stiffness get worse with long periods of rest or using a joint too long or too hard.  · Joints that have lost normal shape and motion.  · Tender, inflamed joints. They may look red and feel warm.  · Grinding or popping noise with joint movement.   · Feeling tired all the time.  Reducing symptoms  Following a healthy lifestyle by losing weight and exercising can help reduce symptoms of osteoarthritis. Medicines can be very helpful for arthritis.     Date Last Reviewed: 9/10/2015  © 9545-5729 [employer]. [address] [place] [address], [place] [address]. All rights reserved. This information is not intended as a substitute for professional medical care. Always follow your healthcare professional's instructions.        Arthritis: Exercise     Look for exercise classes for arthritis in your community.     Exercise is important to your overall health. It is especially important in people with arthritis. Regular exercise can:  · Keep your heart and blood vessels healthy  · Help  with weight management, or weight loss  · Improve your mood  · Help prevent and manage health problems such as:  ¨ Diabetes  ¨ High blood pressure  ¨ High cholesterol  ¨ Depression  In people with arthritis, it offers all of those benefits and it can:  · Lessen pain and stiffness  · Strengthen muscles that support your joints  · Help you to be able to do the things you enjoy  Exercise and arthritis  Exercise is an important part of any arthritis treatment plan. A complete program consists of the following three types of exercises:  · Aerobic exercises for cardiovascular health and overall fitness.   · Strengthening exercises to build up muscles to help prevent injury and keep joints stable.  · Range-of-motion exercises to keep muscles and joints flexible.  Getting started  Talk with your healthcare provider about what is safe for you. Make sure you:  · Learn how to do exercises properly and safely. Consider talking with a physical therapist or  used to working with people with arthritis.  · Start gradually and build. If you haven't been exercising, start slowly. Don't exercise too hard or too long.  · Create a routine. Set aside specific times for exercise every day.  · Warm up carefully. Take 5 to 10 minutes at the beginning and end of exercising to warm up and cool down. Just do the same exercises at a slower pace for 5 to 10 minutes.  · Work at a comfortable, smooth pace. Move your joints gently to prevent injury.  · Pay attention to your body. Don't exercise a painful or swollen joint; switch to another activity. Follow the 2-hour pain rule: You did too much if your joint or muscle pain lasts 2 hours or more after exercising, or is worse the next day. This doesn't mean you should stop exercising. Just do less.  Aerobic exercise  Aerobic exercise improves overall health and helps control weight. Choose those that don't add extra stress to your joints. For example, walking, swimming, or bicycling.  Most  people should exercise for at least 30 minutes. most days of the week. You don't have to exercise all at once. Try exercising for 10 minutes, 3 times a day, for example.  Strengthening exercises  Strengthening your muscles help to protect your joints and prevent injuries. Try to do strengthening exercises 2 to 3 times a week:  · These exercises can be done with exercise or resistance bands (inexpensive exercise aids that add resistance), or with light weights. Some people use soup cans as weights.  · Isometric exercises are done by tightening the muscles without moving the joint. This may be a good way to strengthen the muscles around a stiff joint.  A physical therapist or  can teach you how to do these exercises.  Range-of-motion (ROM) exercises  Range-of-motion (ROM) exercises allow you to move each of your joints in every way they are intended to move. You should do ROM exercises for each joint 2 to 3 times a day. This will help you maintain full use of all of your joints.  Sample ROM exercises  The following are just a sample of ROM exercises--one for your neck, shoulders, elbows, hips, knees, and ankles. To completely move each joint through its full range of motion, you will have to do a few exercises for each joint. A physical therapist or  can teach you how to do full ROM exercises for each joint.   Repeat each for these exercises 5 to 10 times. Make sure you move slowly:  1. Neck turns. Sit in a straight-backed chair. Look straight ahead. Slowly turn your head to the right, then return it to center. Repeat. Do the same thing, turning your head to the left. Repeat.  2. Shoulder raise. Lie on your back or sit in a chair. Raise one arm over your head, keeping your elbow straight. Keep the arm close to your ear. Return it slowly to your side. Repeat with your other arm.  3. Elbow stretch. Sit in a chair. If you are able, put both arms out to your sides to form a T. Slowly touch your shoulders  with the tips of your fingers. Then return to the T-position. Repeat.  4. Hip stretch. Lie on your back with your legs straight and about 6 inches apart. With your foot flexed, slide your leg out to the side, then slide it back to the starting position. Repeat with your other leg.  5. Knee bend. Sit in a chair with your legs bent at the knees in front of you. Straighten one leg as much as you can, then bring it back to the floor. Repeat this 5 to 10 times. Then do the same thing with the other leg.   6. Ankle stretch. Sit with your feet flat on the floor. Lift your toes off of the floor while your heels stay down. Repeat. Then lift your heels off the floor while your toes stay down. Repeat.  Other exercise  Many other exercise and activities benefit people with arthritis. It is most important to find exercise and activities that you enjoy. You might try:  · Yoga, including chair yoga, helps to keep your joints strong and flexible.   · Kasi Chi, an ancient type of exercise with slow, gentle movements  · Water exercise, including water walking  For more information on exercise for arthritis go to the Arth[employer] website: www.arthritis.org.  Date Last Reviewed: 2/14/2016  © 1360-2940 [employer]. [address] [place] [address], [place] [address]. All rights reserved. This information is not intended as a substitute for professional medical care. Always follow your healthcare professional's instructions.        Nonspecific Dermatitis  Dermatitis is a skin rash caused by something that touches the skin and makes it irritated and inflamed.  Your skin may be red, swollen, dry, and may be cracked. Blisters may form and ooze. The rash will itch.  Dermatitis can form on the face and neck, backs of hands, forearms, genitals, and lower legs. Dermatitis is not passed from person to person.  Talk with your health care provider about what may have caused the rash. Common things that cause skin allergies are metal in  jewelry, plants like poison ivy or poison oak, and certain skin care products. You will need to avoid the source of your rash in the future to prevent it from coming back. In some cases, the cause of the dermatitis may not be found.  Treatment is done to relieve itching and prevent the rash from coming back. The rash should go away in a few days to a few weeks.  Home care  The health care provider may prescribe medications to relieve swelling and itching. Follow all instructions when using these medications.  · Avoid anything that heats up your skin, such as hot showers or baths, or direct sunlight. This can make itching worse.  · Stay away from whatever you think caused the rash.  · Bathe in warm, not hot, water. Apply a moisturizing lotion after bathing to prevent dry skin.  · Avoid skin irritants such as wool or silk clothing, grease, oils, harsh soaps, and detergents.  · Apply cold compresses to soothe your sores to help relieve your symptoms. Do this for 30 minutes 3 to 4 times a day. You can make a cold compress by soaking a cloth in cold water. Squeeze out excess water. You can add colloidal oatmeal to the water to help reduce itching. For severe itching in a small area, apply an ice pack wrapped in a thin towel. Do this for 20 minutes 3 to 4 times a day.  · You can also help relieve large areas of itching by taking a lukewarm bath with colloidal oatmeal added to the water.  · Use hydrocortisone cream for redness and irritation, unless another medicine was prescribed. You can also use benzocaine anesthetic cream or spray.  · Use oral diphenhydramine to help reduce itching. This is an antihistamine you can buy at drug and grocery stores. It can make you sleepy, so use lower doses during the daytime. Or you can use loratadine. This is an antihistamine that will not make you sleepy. Dont use diphenhydramine if you have glaucoma or have trouble urinating because of an enlarged prostate.  · Wash your hands or use  an antibacterial gel often to prevent the spread of the rash.  Follow-up care  Follow up with your health care provider. Make an appointment with your health care provider if your symptoms do not get better in the next 1 to 2 weeks.  When to seek medical advice  Call your health care provider right away if any of these occur:  · Spreading of the rash to other parts of your body  · Severe swelling of your face, eyelids, mouth, throat or tongue  · Trouble urinating due to swelling in the genital area  · Fever of 100.4°F (38°C) or higher  · Redness or swelling that gets worse  · Pain that gets worse  · Foul-smelling fluid leaking from the skin  · Yellow-brown crusts on the open blisters  · Joint pain   Date Last Reviewed: 7/23/2014  © 6374-1854 [employer]. [address] [place] [address], [place] [address]. All rights reserved. This information is not intended as a substitute for professional medical care. Always follow your healthcare professional's instructions.

## 2018-09-19 NOTE — PROGRESS NOTES
Subjective:       Patient ID: Vesta RIOS Cousin is a 68 y.o. female.    Chief Complaint: Rash and Follow-up (xray)    Patient presents today for evaluation of rash, arthritis and pain in the hands, and right arm pain.  Patient is new to me and sees Dr. Colon routinely.  Patient is a poor historian with unclear description of problems with mumbling and rambling when speaking.      Arm Pain    The incident occurred more than 1 week ago (fell 2 weeks ago and landed on right arm). The incident occurred at work. The injury mechanism was a fall. The pain is present in the right forearm. The quality of the pain is described as aching. The pain is at a severity of 5/10. The pain is moderate. The pain has been fluctuating since the incident. Associated symptoms include muscle weakness. Pertinent negatives include no chest pain, numbness or tingling. The symptoms are aggravated by movement. She has tried NSAIDs and rest for the symptoms. The treatment provided mild relief.   Rash   This is a recurrent problem. The current episode started 1 to 4 weeks ago. The problem has been waxing and waning since onset. The affected locations include the back and head. The rash is characterized by redness and itchiness. She was exposed to a new detergent/soap. Associated symptoms include joint pain and rhinorrhea (chronic seasonal allergies). Pertinent negatives include no anorexia, congestion, cough, diarrhea, eye pain, facial edema, fatigue, fever, shortness of breath, sore throat or vomiting. Past treatments include anti-itch cream. The treatment provided mild relief. Her past medical history is significant for allergies and varicella.   Hand Pain    The incident occurred more than 1 week ago (unclear as to when it started). There was no injury mechanism. The pain is present in the left hand and right hand. The quality of the pain is described as aching. The pain does not radiate. Pain scale: Cannot give number. The pain is moderate.  Associated symptoms include muscle weakness. Pertinent negatives include no chest pain, numbness or tingling. The symptoms are aggravated by movement. She has tried NSAIDs and rest for the symptoms. The treatment provided mild relief.     Review of Systems   Constitutional: Negative for activity change, appetite change, fatigue and fever.        Overweight   HENT: Positive for rhinorrhea (chronic seasonal allergies). Negative for congestion, ear discharge, ear pain, sore throat, trouble swallowing and voice change.    Eyes: Negative for photophobia, pain, discharge and visual disturbance.   Respiratory: Negative for cough, chest tightness and shortness of breath.    Cardiovascular: Negative for chest pain and palpitations.   Gastrointestinal: Negative for abdominal pain, anorexia, diarrhea, nausea and vomiting.   Endocrine: Negative for cold intolerance and heat intolerance.   Genitourinary: Negative for difficulty urinating and dysuria.   Musculoskeletal: Positive for joint pain. Negative for arthralgias and gait problem.        Right arm pain,  Bilateral hand pain   Skin: Positive for rash.   Allergic/Immunologic: Negative for immunocompromised state.   Neurological: Negative for tingling, speech difficulty, numbness and headaches.   Psychiatric/Behavioral: Positive for agitation, behavioral problems and confusion. Negative for dysphoric mood, self-injury and suicidal ideas. The patient is nervous/anxious.        Past Medical History:   Diagnosis Date    Abdominal pain, acute     Abdominal wall mass of epigastric region     Acid reflux     Acute low back pain     Annual physical exam     Anxiety     Bilateral shoulder pain     Body mass index (bmi) 31.0-31.9, adult     Cervical spondylosis     Chronic right shoulder pain     Decreased GFR     Depression     Hearing aid worn     bilateral    History of ulcerative colitis     History of vitamin D deficiency 4/24/2018    HNP (herniated nucleus  pulposus), lumbar     Hyperlipidemia     Insomnia     Kidney stone     Left-sided low back pain without sciatica     Non-smoker     Osteoarthritis     Osteoarthritis of left glenohumeral joint     Osteopenia     Osteoporosis screening     Palpable abdominal mass     Peripheral neuropathy     Plantar fasciitis of left foot     RLS (restless legs syndrome)     Screening for colorectal cancer     Tear of right rotator cuff     Visit for screening mammogram       Past Surgical History:   Procedure Laterality Date    ARTHROPLASTY-SHOULDER Left 7/15/2016    Per[facility]    ARTHROPLASTY-SHOULDER Right 5/13/2016    [facility] at [facility]    CHOLECYSTECTOMY      COLON SURGERY      HEEL SPUR SURGERY      JOINT REPLACEMENT Bilateral     hip    partial colectomy      for ulcerative colitis    SHOULDER SURGERY  05/2016    TONSILLECTOMY      total right hip replacement         Family History   Problem Relation Age of Onset    Arthritis Father     Heart disease Father        Social History     Socioeconomic History    Marital status:      Spouse name: None    Number of children: 2    Years of education: None    Highest education level: None   Social Needs    Financial resource strain: None    Food insecurity - worry: None    Food insecurity - inability: None    Transportation needs - medical: None    Transportation needs - non-medical: None   Occupational History    None   Tobacco Use    Smoking status: Never Smoker    Smokeless tobacco: Never Used   Substance and Sexual Activity    Alcohol use: Yes     Comment: [email or web address]. [email or web address]    Drug use: No    Sexual activity: Yes     Partners: Male   Other Topics Concern    None   Social History Narrative        2 sons    Works part time at [employer] store       Current Outpatient Medications   Medication Sig Dispense Refill    calcium carbonate-vitamin D3 (CALTRATE  600 + D) 600 mg (1,500 mg)-800 unit Chew Take 1 tablet by mouth 2 (two) times daily. 100 tablet 5    citalopram (CELEXA) 20 MG tablet Take 1 tablet (20 mg total) by mouth once daily. 30 tablet 2    cyclobenzaprine (FLEXERIL) 5 MG tablet TAKE ONE TABLET BY MOUTH THREE TIMES DAILY AS NEEDED 60 tablet 2    meloxicam (MOBIC) 15 MG tablet TAKE 1 TABLET BY MOUTH ONCE DAILY 30 tablet 1    omeprazole (PRILOSEC) 20 MG capsule Take 1 capsule (20 mg total) by mouth every evening. 30 capsule 2    rOPINIRole (REQUIP) 4 MG tablet Take 1 tablet (4 mg total) by mouth 2 (two) times daily. 60 tablet 11    traZODone (DESYREL) 50 MG tablet TAKE ONE TABLET BY MOUTH NIGHTLY 30 tablet 2    triamcinolone acetonide 0.5% (KENALOG) 0.5 % Crea Apply topically 2 (two) times daily. (Patient taking differently: Apply topically 2 (two) times daily as needed. ) 30 g 1    cetirizine (ZYRTEC) 10 MG tablet Take 1 tablet (10 mg total) by mouth once daily. 30 tablet 3     No current facility-administered medications for this visit.        Review of patient's allergies indicates:  No Known Allergies  Objective:    HPI     Follow-up      Additional comments: xray          Last edited by Fatoumata Enriquez MA on 9/19/2018 10:35 AM. (History)      Blood pressure 132/70, pulse 71, height 5' (1.524 m), weight 64.6 kg (142 lb 6.4 oz), SpO2 97 %. Body mass index is 27.81 kg/m².   Physical Exam   Constitutional: She is oriented to person, place, and time. She appears well-developed. She is cooperative. No distress.   overweight   HENT:   Head: Normocephalic and atraumatic.   Right Ear: Tympanic membrane normal.   Left Ear: Tympanic membrane normal.   Nose: Nose normal.   Mouth/Throat: Uvula is midline and oropharynx is clear and moist.   Eyes: Conjunctivae, EOM and lids are normal. Pupils are equal, round, and reactive to light. Lids are everted and swept, no foreign bodies found. Right pupil is round and reactive. Left pupil is round and reactive.   Neck:  Trachea normal and normal range of motion. Neck supple.   Cardiovascular: Normal rate, regular rhythm, S1 normal, S2 normal and normal heart sounds.   Pulmonary/Chest: Effort normal and breath sounds normal. She has no wheezes.   Abdominal: There is no rigidity.   Musculoskeletal: Normal range of motion.        Right elbow: She exhibits normal range of motion, no swelling, no effusion and no deformity. Tenderness found.        Arms:  Tenderness with lower arm with movement.  No edema noted.    Lymphadenopathy:     She has no cervical adenopathy.     She has no axillary adenopathy.   Neurological: She is alert and oriented to person, place, and time.   Skin: Skin is warm and dry. Capillary refill takes less than 2 seconds. Rash noted. Rash is papular (papular rash noted with evidence of scratching.).        Psychiatric: She has a normal mood and affect. Her behavior is normal. Judgment and thought content normal.   Nursing note and vitals reviewed.          Assessment:       1. Fall, initial encounter    2. Injury of right upper extremity, initial encounter    3. Arthritis    4. Bilateral hand pain    5. Dermatitis    6. Pruritus        Plan:       Vesta was seen today for rash and follow-up.    Diagnoses and all orders for this visit:    Fall, initial encounter  -     X-Ray Humerus 2 View Right; Future  -     X-Ray Wrist 2 View Right; Future  -     X-Ray Humerus 2 View Right  -     X-Ray Wrist 2 View Right    Injury of right upper extremity, initial encounter  -     X-Ray Humerus 2 View Right; Future  -     X-Ray Wrist 2 View Right; Future  -     X-Ray Humerus 2 View Right  -     X-Ray Wrist 2 View Right    Arthritis  -Continue NSAID for arthritis as prescribed.  Physical therapy offered and patient denies this at this time.  Suggested hand exercises for patient.    Bilateral hand pain  -Continue NSAID for arthritis as prescribed.  Physical therapy offered and patient denies this at this time.  Suggested hand  exercises for patient.    Dermatitis  -     cetirizine (ZYRTEC) 10 MG tablet; Take 1 tablet (10 mg total) by mouth once daily.    Pruritus  -     cetirizine (ZYRTEC) 10 MG tablet; Take 1 tablet (10 mg total) by mouth once daily.         After discussion of treatment plan and suggestions, patient is visibly upset.  Patient is unclear as to why she is upset and verbalizes she feels as though nothing she is not being cared for.  Patient seems emotionally unstable.  Explained to patient the plan of care and suggested treatment with screening tests (x-ray).  Patient states she will not have the x-ray completed.  Encouraged patient to have screening completed.  Patient instructed to follow up within 1 month.

## 2018-10-17 ENCOUNTER — OFFICE VISIT (OUTPATIENT)
Dept: FAMILY MEDICINE | Facility: CLINIC | Age: 68
End: 2018-10-17
Payer: MEDICARE

## 2018-10-17 VITALS
BODY MASS INDEX: 27.09 KG/M2 | WEIGHT: 138 LBS | DIASTOLIC BLOOD PRESSURE: 70 MMHG | HEIGHT: 60 IN | TEMPERATURE: 99 F | SYSTOLIC BLOOD PRESSURE: 114 MMHG | HEART RATE: 87 BPM

## 2018-10-17 DIAGNOSIS — J20.9 ACUTE BRONCHITIS, UNSPECIFIED ORGANISM: Primary | ICD-10-CM

## 2018-10-17 PROCEDURE — 99212 OFFICE O/P EST SF 10 MIN: CPT | Mod: ,,, | Performed by: INTERNAL MEDICINE

## 2018-10-17 RX ORDER — PREDNISONE 20 MG/1
20 TABLET ORAL DAILY
Qty: 4 TABLET | Refills: 0 | Status: SHIPPED | OUTPATIENT
Start: 2018-10-17 | End: 2018-10-27

## 2018-10-17 RX ORDER — DOXYCYCLINE 100 MG/1
100 CAPSULE ORAL 2 TIMES DAILY
Qty: 14 CAPSULE | Refills: 0 | Status: SHIPPED | OUTPATIENT
Start: 2018-10-17 | End: 2019-06-11

## 2018-10-17 NOTE — PATIENT INSTRUCTIONS
Acute Bronchitis  Your healthcare provider has told you that you have acute bronchitis. Bronchitis is infection or inflammation of the bronchial tubes (airways in the lungs). Normally, air moves easily in and out of the airways. Bronchitis narrows the airways, making it harder for air to flow in and out of the lungs. This causes symptoms such as shortness of breath, coughing up yellow or green mucus, and wheezing. Bronchitis can be acute or chronic. Acute means the condition comes on quickly and goes away in a short time, usually within 3 to 10 days. Chronic means a condition lasts a long time and often comes back.    What causes acute bronchitis?  Acute bronchitis almost always starts as a viral respiratory infection, such as a cold or the flu. Certain factors make it more likely for a cold or flu to turn into bronchitis. These include being very young, being elderly, having a heart or lung problem, or having a weak immune system. Cigarette smoking also makes bronchitis more likely.  When bronchitis develops, the airways become swollen. The airways may also become infected with bacteria. This is known as a secondary infection.  Diagnosing acute bronchitis  Your healthcare provider will examine you and ask about your symptoms and health history. You may also have a sputum culture to test the fluid in your lungs. Chest X-rays may be done to look for infection in the lungs.  Treating acute bronchitis  Bronchitis usually clears up as the cold or flu goes away. You can help feel better faster by doing the following:  · Take medicine as directed. You may be told to take ibuprofen or other over-the-counter medicines. These help relieve inflammation in your bronchial tubes. Your healthcare provider may prescribe an inhaler to help open up the bronchial tubes. Most of the time, acute bronchitis is caused by a viral infection. Antibiotics are usually not prescribed for viral infections.  · Drink plenty of fluids, such as  water, juice, or warm soup. Fluids loosen mucus so that you can cough it up. This helps you breathe more easily. Fluids also prevent dehydration.  · Make sure you get plenty of rest.  · Do not smoke. Do not allow anyone else to smoke in your home.  Recovery and follow-up  Follow up with your doctor as you are told. You will likely feel better in a week or two. But a dry cough can linger beyond that time. Let your doctor know if you still have symptoms (other than a dry cough) after 2 weeks, or if youre prone to getting bronchial infections. Take steps to protect yourself from future infections. These steps include stopping smoking and avoiding tobacco smoke, washing your hands often, and getting a yearly flu shot.  When to call your healthcare provider  Call the healthcare provider if you have any of the following:  · Fever of 100.4°F (38.0°C) or higher, or as advised  · Symptoms that get worse, or new symptoms  · Trouble breathing  · Symptoms that dont start to improve within a week, or within 3 days of taking antibiotics   Date Last Reviewed: 12/1/2016  © 0193-1899 [employer]. [address] [place] [address], [place] [address]. All rights reserved. This information is not intended as a substitute for professional medical care. Always follow your healthcare professional's instructions.

## 2018-10-17 NOTE — PROGRESS NOTES
Subjective:       Patient ID: Vesta RIOS Cousin is a 68 y.o. female.    Chief Complaint: Cough and URI    Cough   This is a new problem. The current episode started in the past 7 days. The cough is productive of brown sputum. Associated symptoms include myalgias. Pertinent negatives include no chest pain, chills, fever, postnasal drip or shortness of breath. The treatment provided no relief. There is no history of environmental allergies.   URI    This is a new problem. The current episode started in the past 7 days. Associated symptoms include coughing. Pertinent negatives include no chest pain, congestion or diarrhea. The treatment provided no relief.       Past Medical History:   Diagnosis Date    Abdominal pain, acute     Abdominal wall mass of epigastric region     Acid reflux     Acute low back pain     Annual physical exam     Anxiety     Bilateral shoulder pain     Body mass index (bmi) 31.0-31.9, adult     Cervical spondylosis     Chronic right shoulder pain     Decreased GFR     Depression     Hearing aid worn     bilateral    History of ulcerative colitis     History of vitamin D deficiency 4/24/2018    HNP (herniated nucleus pulposus), lumbar     Hyperlipidemia     Insomnia     Kidney stone     Left-sided low back pain without sciatica     Non-smoker     Osteoarthritis     Osteoarthritis of left glenohumeral joint     Osteopenia     Osteoporosis screening     Palpable abdominal mass     Peripheral neuropathy     Plantar fasciitis of left foot     RLS (restless legs syndrome)     Screening for colorectal cancer     Tear of right rotator cuff     Visit for screening mammogram      Social History     Socioeconomic History    Marital status:      Spouse name: Not on file    Number of children: 2    Years of education: Not on file    Highest education level: Not on file   Social Needs    Financial resource strain: Not on file    Food insecurity - worry: Not on file     Food insecurity - inability: Not on file    Transportation needs - medical: Not on file    Transportation needs - non-medical: Not on file   Occupational History    Not on file   Tobacco Use    Smoking status: Never Smoker    Smokeless tobacco: Never Used   Substance and Sexual Activity    Alcohol use: Yes     Comment: kaya janets    Drug use: No    Sexual activity: Yes     Partners: Male   Other Topics Concern    Not on file   Social History Narrative        2 sons    Works part time at [employer] store     Past Surgical History:   Procedure Laterality Date    ARTHROPLASTY-SHOULDER Left 7/15/2016    Performed by [facility] S[facility] at [facility] [place] OR    ARTHROPLASTY-SHOULDER Right 5/13/2016    Performed by [facility] at [facility] [place] OR    CHOLECYSTECTOMY      COLON SURGERY      HEEL SPUR SURGERY      JOINT REPLACEMENT Bilateral     hip    partial colectomy      for ulcerative colitis    SHOULDER SURGERY  05/2016    TONSILLECTOMY      total right hip replacement       Family History   Problem Relation Age of Onset    Arthritis Father     Heart disease Father        Review of Systems   Constitutional: Negative for chills, fatigue and fever.        Difficult historian   HENT: Negative for congestion, postnasal drip and sinus pressure.    Eyes: Negative for pain, discharge and visual disturbance.   Respiratory: Positive for cough. Negative for chest tightness and shortness of breath.    Cardiovascular: Negative for chest pain, palpitations and leg swelling.   Gastrointestinal: Negative for abdominal distention, anal bleeding, constipation and diarrhea.   Musculoskeletal: Positive for myalgias. Negative for joint swelling.        Left heel pain   Skin: Negative for color change and pallor.   Allergic/Immunologic: Negative for environmental allergies.   Neurological: Negative for light-headedness.        RLS-takes generic requip   Psychiatric/Behavioral: Positive for sleep  disturbance. The patient is not nervous/anxious.          Objective:      Blood pressure 114/70, pulse 87, temperature 98.8 °F (37.1 °C), height 5' (1.524 m), weight 62.6 kg (138 lb). Body mass index is 26.95 kg/m².  Physical Exam   Constitutional: She appears well-developed. She is cooperative. No distress.   HENT:   Head: Normocephalic and atraumatic.   Eyes: Conjunctivae, EOM and lids are normal. Pupils are equal, round, and reactive to light. Lids are everted and swept, no foreign bodies found. Right pupil is round and reactive. Left pupil is round and reactive.   Neck: Trachea normal and normal range of motion. Neck supple. No thyromegaly present.   Cardiovascular: Normal rate, regular rhythm, S1 normal, S2 normal and normal heart sounds.   Pulmonary/Chest: Breath sounds normal.   Abdominal: Soft. There is no rigidity.   Musculoskeletal: Tenderness: right kneeon range of motion and right hand.   Lymphadenopathy:     She has no cervical adenopathy.   Neurological: She is alert.   Skin: Skin is warm and dry.   Psychiatric: She is not actively hallucinating. Cognition and memory are impaired.   Rather flat affect. Soft-spoken. Has trouble comprehending my questions and answering them. She is inattentive.   Nursing note and vitals reviewed.        Assessment:       1. Acute bronchitis, unspecified organism           No visits with results within 3 Month(s) from this visit.   Latest known visit with results is:   Office Visit on 04/24/2018   Component Date Value Ref Range Status    Hepatitis C Ab 04/24/2018 <0.1  0.0 - 0.9 s/co ratio Final    WBC 04/24/2018 7.9  5.0 - 10.0 K/uL Final    RBC 04/24/2018 4.81  3.50 - 5.50 M/uL Final    Hemoglobin 04/24/2018 14.0  12.0 - 15.0 g/dL Final    Hematocrit 04/24/2018 43.6  36.0 - 48.0 % Final    MCV 04/24/2018 90.6  79.0 - 98.0 fL Final    MCH 04/24/2018 29.1  25.0 - 35.0 pg Final    MCHC 04/24/2018 32.1  31.0 - 36.0 g/dL Final    RDW 04/24/2018 13.2  11.7 - 14.9 %  Final    Platelets 04/24/2018 275  140 - 440 K/uL Final    MPV 04/24/2018 10.7  8.8 - 12.7 fL Final    Gran% 04/24/2018 58.4  % Final    Lymph% 04/24/2018 22.7  % Final    Mono% 04/24/2018 10.1  % Final    Eosinophil% 04/24/2018 7.4  % Final    Basophil% 04/24/2018 1.1  % Final    Gran # (ANC) 04/24/2018 4.6  1.4 - 6.5 K/uL Final    Lymph # 04/24/2018 1.8  1.2 - 3.4 K/uL Final    Mono # 04/24/2018 0.8* 0.1 - 0.6 K/uL Final    Eos # 04/24/2018 0.6  0.0 - 0.7 K/uL Final    Baso # 04/24/2018 0.1  0.0 - 0.2 K/uL Final    Immature Grans (Abs) 04/24/2018 0.0  0.0 - 1.0 K/uL Final    Immature Granulocytes 04/24/2018 0.3  % Final    nRBC% 04/24/2018 0  % Final    Glucose 04/24/2018 99  70 - 99 mg/dL Final    BUN, Bld 04/24/2018 23* 8 - 20 mg/dL Final    Creatinine 04/24/2018 0.75  0.60 - 1.40 mg/dL Final    Calcium 04/24/2018 9.3  7.7 - 10.4 mg/dL Final    Sodium 04/24/2018 137  134 - 144 mmol/L Final    Potassium 04/24/2018 4.4  3.5 - 5.0 mmol/L Final    Chloride 04/24/2018 104  98 - 110 mmol/L Final    CO2 04/24/2018 25.3  22.8 - 31.6 mmol/L Final    Albumin 04/24/2018 4.4  3.1 - 4.7 g/dL Final    Total Bilirubin 04/24/2018 0.5  0.3 - 1.0 mg/dL Final    Alkaline Phosphatase 04/24/2018 86  40 - 104 IU/L Final    Total Protein 04/24/2018 7.6  6.0 - 8.2 g/dL Final    ALT (SGPT) 04/24/2018 17  3 - 33 IU/L Final    AST 04/24/2018 19  10 - 40 IU/L Final    Ferritin 04/24/2018 63  24 - 162 ng/mL Final    CRP 04/24/2018 0.19  0.00 - 1.40 mg/dL Final         Plan:           Acute bronchitis, unspecified organism  -     doxycycline (MONODOX) 100 MG capsule; Take 1 capsule (100 mg total) by mouth 2 (two) times daily.  Dispense: 14 capsule; Refill: 0  -     predniSONE (DELTASONE) 20 MG tablet; Take 1 tablet (20 mg total) by mouth once daily. for 10 days  Dispense: 4 tablet; Refill: 0  -     dextromethorphan-guaifenesin  mg (MUCINEX DM)  mg per 12 hr tablet; Take 1 tablet by mouth 2 (two)  times daily. for 10 days  Dispense: 20 tablet; Refill: 0      Increase your water intake to 64-80 oz daily    Nasal Saline spray (Over the counter [place] spray or [place])  2 sprays each nostril 2-3 times a day for nasal congestion    Tylenol 500 mg 2 tablets or Ibuprofen 200 mg 2 tablets every 4-6 hours as needed for fever, headaches, sore throat, ear pain, bodyaches, and/or nasal/sinus inflammation    Warm salt water gargles with 1/2 cup water and 1 tablespoon salt every 4 hours    Warm tea with honey and lemon    Follow up with PCP in 1 week if symptoms do not resolve              Current Outpatient Medications:     calcium carbonate-vitamin D3 (CALTRATE 600 + D) 600 mg (1,500 mg)-800 unit Chew, Take 1 tablet by mouth 2 (two) times daily., Disp: 100 tablet, Rfl: 5    cetirizine (ZYRTEC) 10 MG tablet, Take 1 tablet (10 mg total) by mouth once daily., Disp: 30 tablet, Rfl: 3    citalopram (CELEXA) 20 MG tablet, Take 1 tablet (20 mg total) by mouth once daily., Disp: 30 tablet, Rfl: 2    cyclobenzaprine (FLEXERIL) 5 MG tablet, TAKE ONE TABLET BY MOUTH THREE TIMES DAILY AS NEEDED, Disp: 60 tablet, Rfl: 2    meloxicam (MOBIC) 15 MG tablet, TAKE 1 TABLET BY MOUTH ONCE DAILY, Disp: 30 tablet, Rfl: 1    omeprazole (PRILOSEC) 20 MG capsule, Take 1 capsule (20 mg total) by mouth every evening., Disp: 30 capsule, Rfl: 2    rOPINIRole (REQUIP) 4 MG tablet, Take 1 tablet (4 mg total) by mouth 2 (two) times daily., Disp: 60 tablet, Rfl: 11    traZODone (DESYREL) 50 MG tablet, TAKE ONE TABLET BY MOUTH NIGHTLY, Disp: 30 tablet, Rfl: 2    dextromethorphan-guaifenesin  mg (MUCINEX DM)  mg per 12 hr tablet, Take 1 tablet by mouth 2 (two) times daily. for 10 days, Disp: 20 tablet, Rfl: 0    doxycycline (MONODOX) 100 MG capsule, Take 1 capsule (100 mg total) by mouth 2 (two) times daily., Disp: 14 capsule, Rfl: 0    predniSONE (DELTASONE) 20 MG tablet, Take 1 tablet (20 mg total) by mouth once daily. for 10 days,  Disp: 4 tablet, Rfl: 0    triamcinolone acetonide 0.5% (KENALOG) 0.5 % Crea, Apply topically 2 (two) times daily. (Patient taking differently: Apply topically 2 (two) times daily as needed. ), Disp: 30 g, Rfl: 1

## 2018-11-26 DIAGNOSIS — F41.9 ANXIETY: ICD-10-CM

## 2018-11-26 DIAGNOSIS — K21.9 GASTROESOPHAGEAL REFLUX DISEASE WITHOUT ESOPHAGITIS: ICD-10-CM

## 2018-11-27 RX ORDER — TRAZODONE HYDROCHLORIDE 50 MG/1
TABLET ORAL
Qty: 30 TABLET | Refills: 2 | Status: SHIPPED | OUTPATIENT
Start: 2018-11-27 | End: 2019-03-14 | Stop reason: SDUPTHER

## 2018-11-27 RX ORDER — OMEPRAZOLE 20 MG/1
CAPSULE, DELAYED RELEASE ORAL
Qty: 30 CAPSULE | Refills: 2 | Status: SHIPPED | OUTPATIENT
Start: 2018-11-27 | End: 2019-03-09 | Stop reason: SDUPTHER

## 2018-11-27 RX ORDER — CITALOPRAM 20 MG/1
TABLET, FILM COATED ORAL
Qty: 30 TABLET | Refills: 2 | Status: SHIPPED | OUTPATIENT
Start: 2018-11-27 | End: 2019-03-09 | Stop reason: SDUPTHER

## 2018-12-28 DIAGNOSIS — J20.9 ACUTE BRONCHITIS, UNSPECIFIED ORGANISM: ICD-10-CM

## 2018-12-29 ENCOUNTER — PATIENT MESSAGE (OUTPATIENT)
Dept: FAMILY MEDICINE | Facility: CLINIC | Age: 68
End: 2018-12-29

## 2018-12-31 DIAGNOSIS — M72.2 PLANTAR FASCIITIS: ICD-10-CM

## 2018-12-31 RX ORDER — MELOXICAM 15 MG/1
TABLET ORAL
Qty: 30 TABLET | Refills: 1 | Status: SHIPPED | OUTPATIENT
Start: 2018-12-31 | End: 2019-03-09 | Stop reason: SDUPTHER

## 2019-01-03 ENCOUNTER — TELEPHONE (OUTPATIENT)
Dept: FAMILY MEDICINE | Facility: CLINIC | Age: 69
End: 2019-01-03

## 2019-01-03 RX ORDER — DOXYCYCLINE 100 MG/1
CAPSULE ORAL
Qty: 14 CAPSULE | Refills: 0 | OUTPATIENT
Start: 2019-01-03

## 2019-01-26 DIAGNOSIS — L30.9 DERMATITIS: ICD-10-CM

## 2019-01-28 RX ORDER — CETIRIZINE HYDROCHLORIDE 10 MG/1
TABLET ORAL
Qty: 30 TABLET | Refills: 3 | Status: SHIPPED | OUTPATIENT
Start: 2019-01-28 | End: 2019-03-25 | Stop reason: SDUPTHER

## 2019-03-09 DIAGNOSIS — M72.2 PLANTAR FASCIITIS: ICD-10-CM

## 2019-03-09 DIAGNOSIS — F41.9 ANXIETY: ICD-10-CM

## 2019-03-09 DIAGNOSIS — K21.9 GASTROESOPHAGEAL REFLUX DISEASE WITHOUT ESOPHAGITIS: ICD-10-CM

## 2019-03-09 RX ORDER — MELOXICAM 15 MG/1
TABLET ORAL
Qty: 30 TABLET | Refills: 1 | Status: SHIPPED | OUTPATIENT
Start: 2019-03-09 | End: 2019-03-14 | Stop reason: SDUPTHER

## 2019-03-09 RX ORDER — OMEPRAZOLE 20 MG/1
CAPSULE, DELAYED RELEASE ORAL
Qty: 30 CAPSULE | Refills: 2 | Status: SHIPPED | OUTPATIENT
Start: 2019-03-09 | End: 2019-03-14 | Stop reason: SDUPTHER

## 2019-03-09 RX ORDER — CITALOPRAM 20 MG/1
TABLET, FILM COATED ORAL
Qty: 30 TABLET | Refills: 2 | Status: SHIPPED | OUTPATIENT
Start: 2019-03-09 | End: 2019-03-14 | Stop reason: SDUPTHER

## 2019-03-14 DIAGNOSIS — F41.9 ANXIETY: ICD-10-CM

## 2019-03-14 DIAGNOSIS — M72.2 PLANTAR FASCIITIS: ICD-10-CM

## 2019-03-14 DIAGNOSIS — G25.81 RLS (RESTLESS LEGS SYNDROME): ICD-10-CM

## 2019-03-14 DIAGNOSIS — K21.9 GASTROESOPHAGEAL REFLUX DISEASE WITHOUT ESOPHAGITIS: ICD-10-CM

## 2019-03-16 RX ORDER — TRAZODONE HYDROCHLORIDE 50 MG/1
50 TABLET ORAL NIGHTLY
Qty: 30 TABLET | Refills: 2 | Status: SHIPPED | OUTPATIENT
Start: 2019-03-16 | End: 2019-03-21 | Stop reason: SDUPTHER

## 2019-03-16 RX ORDER — OMEPRAZOLE 20 MG/1
CAPSULE, DELAYED RELEASE ORAL
Qty: 30 CAPSULE | Refills: 2 | Status: SHIPPED | OUTPATIENT
Start: 2019-03-16 | End: 2019-03-21 | Stop reason: SDUPTHER

## 2019-03-16 RX ORDER — MELOXICAM 15 MG/1
15 TABLET ORAL DAILY
Qty: 30 TABLET | Refills: 1 | Status: SHIPPED | OUTPATIENT
Start: 2019-03-16 | End: 2019-03-21 | Stop reason: SDUPTHER

## 2019-03-16 RX ORDER — ROPINIROLE 4 MG/1
4 TABLET, FILM COATED ORAL 2 TIMES DAILY
Qty: 60 TABLET | Refills: 1 | Status: SHIPPED | OUTPATIENT
Start: 2019-03-16 | End: 2019-03-21 | Stop reason: SDUPTHER

## 2019-03-16 RX ORDER — CITALOPRAM 20 MG/1
20 TABLET, FILM COATED ORAL DAILY
Qty: 30 TABLET | Refills: 2 | Status: SHIPPED | OUTPATIENT
Start: 2019-03-16 | End: 2019-03-21 | Stop reason: SDUPTHER

## 2019-03-21 DIAGNOSIS — M72.2 PLANTAR FASCIITIS: ICD-10-CM

## 2019-03-21 DIAGNOSIS — G25.81 RLS (RESTLESS LEGS SYNDROME): ICD-10-CM

## 2019-03-21 DIAGNOSIS — K21.9 GASTROESOPHAGEAL REFLUX DISEASE WITHOUT ESOPHAGITIS: ICD-10-CM

## 2019-03-21 DIAGNOSIS — F41.9 ANXIETY: ICD-10-CM

## 2019-03-21 RX ORDER — ROPINIROLE 4 MG/1
4 TABLET, FILM COATED ORAL 2 TIMES DAILY
Qty: 60 TABLET | Refills: 1 | Status: SHIPPED | OUTPATIENT
Start: 2019-03-21 | End: 2019-06-11

## 2019-03-21 RX ORDER — OMEPRAZOLE 20 MG/1
CAPSULE, DELAYED RELEASE ORAL
Qty: 30 CAPSULE | Refills: 2 | Status: SHIPPED | OUTPATIENT
Start: 2019-03-21 | End: 2019-03-25 | Stop reason: SDUPTHER

## 2019-03-21 RX ORDER — CITALOPRAM 20 MG/1
20 TABLET, FILM COATED ORAL DAILY
Qty: 30 TABLET | Refills: 2 | Status: SHIPPED | OUTPATIENT
Start: 2019-03-21 | End: 2019-03-25 | Stop reason: SDUPTHER

## 2019-03-21 RX ORDER — MELOXICAM 15 MG/1
15 TABLET ORAL DAILY
Qty: 30 TABLET | Refills: 1 | Status: SHIPPED | OUTPATIENT
Start: 2019-03-21 | End: 2019-10-09 | Stop reason: SDUPTHER

## 2019-03-21 RX ORDER — TRAZODONE HYDROCHLORIDE 50 MG/1
50 TABLET ORAL NIGHTLY
Qty: 30 TABLET | Refills: 2 | Status: SHIPPED | OUTPATIENT
Start: 2019-03-21 | End: 2019-03-25 | Stop reason: SDUPTHER

## 2019-03-25 DIAGNOSIS — F41.9 ANXIETY: ICD-10-CM

## 2019-03-25 DIAGNOSIS — K21.9 GASTROESOPHAGEAL REFLUX DISEASE WITHOUT ESOPHAGITIS: ICD-10-CM

## 2019-03-25 DIAGNOSIS — L30.9 DERMATITIS: ICD-10-CM

## 2019-03-25 RX ORDER — CITALOPRAM 20 MG/1
20 TABLET, FILM COATED ORAL DAILY
Qty: 90 TABLET | Refills: 0 | Status: SHIPPED | OUTPATIENT
Start: 2019-03-25 | End: 2019-06-11 | Stop reason: SDUPTHER

## 2019-03-25 RX ORDER — OMEPRAZOLE 20 MG/1
CAPSULE, DELAYED RELEASE ORAL
Qty: 90 CAPSULE | Refills: 0 | Status: SHIPPED | OUTPATIENT
Start: 2019-03-25 | End: 2019-09-26 | Stop reason: SDUPTHER

## 2019-03-25 RX ORDER — TRAZODONE HYDROCHLORIDE 50 MG/1
50 TABLET ORAL NIGHTLY
Qty: 90 TABLET | Refills: 0 | Status: SHIPPED | OUTPATIENT
Start: 2019-03-25 | End: 2019-07-13 | Stop reason: SDUPTHER

## 2019-03-25 RX ORDER — CETIRIZINE HYDROCHLORIDE 10 MG/1
10 TABLET ORAL DAILY
Qty: 90 TABLET | Refills: 0 | Status: SHIPPED | OUTPATIENT
Start: 2019-03-25 | End: 2022-08-24

## 2019-06-11 ENCOUNTER — OFFICE VISIT (OUTPATIENT)
Dept: FAMILY MEDICINE | Facility: CLINIC | Age: 69
End: 2019-06-11
Payer: MEDICARE

## 2019-06-11 VITALS
HEART RATE: 66 BPM | SYSTOLIC BLOOD PRESSURE: 107 MMHG | WEIGHT: 142 LBS | BODY MASS INDEX: 27.88 KG/M2 | HEIGHT: 60 IN | DIASTOLIC BLOOD PRESSURE: 60 MMHG
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
## 2019-06-11 DIAGNOSIS — G25.81 RLS (RESTLESS LEGS SYNDROME): ICD-10-CM

## 2019-06-11 DIAGNOSIS — F41.9 ANXIETY: ICD-10-CM

## 2019-06-11 DIAGNOSIS — Z12.39 SCREENING FOR BREAST CANCER: ICD-10-CM

## 2019-06-11 DIAGNOSIS — S09.90XA TRAUMATIC INJURY OF HEAD, INITIAL ENCOUNTER: ICD-10-CM

## 2019-06-11 DIAGNOSIS — F34.1 DYSTHYMIA: ICD-10-CM

## 2019-06-11 DIAGNOSIS — J31.0 NON-ALLERGIC RHINITIS: ICD-10-CM

## 2019-06-11 PROCEDURE — 1101F PR PT FALLS ASSESS DOC 0-1 FALLS W/OUT INJ PAST YR: ICD-10-PCS | Mod: ,,, | Performed by: INTERNAL MEDICINE

## 2019-06-11 PROCEDURE — 1101F PT FALLS ASSESS-DOCD LE1/YR: CPT | Mod: ,,, | Performed by: INTERNAL MEDICINE

## 2019-06-11 PROCEDURE — 99214 PR OFFICE/OUTPT VISIT, EST, LEVL IV, 30-39 MIN: ICD-10-PCS | Mod: ,,, | Performed by: INTERNAL MEDICINE

## 2019-06-11 PROCEDURE — 99214 OFFICE O/P EST MOD 30 MIN: CPT | Mod: ,,, | Performed by: INTERNAL MEDICINE

## 2019-06-11 RX ORDER — IPRATROPIUM BROMIDE 21 UG/1
2 SPRAY, METERED NASAL 2 TIMES DAILY
Qty: 30 ML | Refills: 2 | Status: SHIPPED | OUTPATIENT
Start: 2019-06-11 | End: 2019-07-30 | Stop reason: SDUPTHER

## 2019-06-11 RX ORDER — GABAPENTIN 300 MG/1
600 CAPSULE ORAL NIGHTLY
Qty: 60 CAPSULE | Refills: 5 | Status: SHIPPED | OUTPATIENT
Start: 2019-06-11 | End: 2019-07-30 | Stop reason: SDUPTHER

## 2019-06-11 RX ORDER — CITALOPRAM 20 MG/1
10 TABLET, FILM COATED ORAL DAILY
Qty: 90 TABLET | Refills: 0 | Status: SHIPPED | OUTPATIENT
Start: 2019-06-11 | End: 2019-09-18 | Stop reason: SDUPTHER

## 2019-06-11 NOTE — PROGRESS NOTES
Subjective:       Patient ID: Vesta RIOS Cousin is a 68 y.o. female.    Chief Complaint: Depression (postive test ); Leg Pain (RLS); Gastroesophageal Reflux; RLS; Gait Difficulty; and Nasal Congestion    Ms. Vesta vargas is a 68-year-old  female who comes for follow-up approximately after 9 months. Last and she was seen in October 2018.  Patient's medical issues include gastroesophageal reflux, arthritis, insomnia, depression and restless leg syndrome. Features.    Recently she has fallen on it at least couple of occasions. She loses her balance and trips and falls. She attributes this to grogginess caused her multiple medications.    Currently she is taking trazodone 100 mg at bedtime, citalopram 20 mg, cetirizine for allergies and omeprazole.    She is not on any sedative prescribed by me.    About 7 days ago or so she borrowed couple of gabapentin 300 mg each from her  and took them for restless legs which seem to help her. After that she takes it 100 mg of trazodone which helps her go to sleep. Ropinirole did not seem to help her with the restless leg.    She is also wondering whether she needs to be on citalopram for depression or anxiety.    Depression   Visit Type: follow-up  Patient presents with the following symptoms: anhedonia, compulsions and insomnia.  Patient is not experiencing: nervousness/anxiety, palpitations, shortness of breath and suicidal planning.  Frequency of symptoms: most days     Gastroesophageal Reflux   She complains of heartburn. She reports no chest pain or no coughing. This is a recurrent problem. The current episode started more than 1 year ago. Pertinent negatives include no fatigue. She has tried a PPI for the symptoms. The treatment provided moderate relief.   Sinus Problem   This is a chronic problem. The current episode started more than 1 year ago. The problem is unchanged. Her pain is at a severity of 0/10. Pertinent negatives include no chills, congestion,  coughing, shortness of breath or sinus pressure. The treatment provided no relief.       Past Medical History:   Diagnosis Date    Abdominal pain, acute     Abdominal wall mass of epigastric region     Acid reflux     Acute low back pain     Annual physical exam     Anxiety     Bilateral shoulder pain     Body mass index (bmi) 31.0-31.9, adult     Cervical spondylosis     Chronic right shoulder pain     Decreased GFR     Depression     Hearing aid worn     bilateral    History of ulcerative colitis     History of vitamin D deficiency 4/24/2018    HNP (herniated nucleus pulposus), lumbar     Hyperlipidemia     Insomnia     Kidney stone     Left-sided low back pain without sciatica     Non-smoker     Osteoarthritis     Osteoarthritis of left glenohumeral joint     Osteopenia     Osteoporosis screening     Palpable abdominal mass     Peripheral neuropathy     Plantar fasciitis of left foot     RLS (restless legs syndrome)     Screening for colorectal cancer     Tear of right rotator cuff     Visit for screening mammogram      Social History     Socioeconomic History    Marital status:      Spouse name: Not on file    Number of children: 2    Years of education: Not on file    Highest education level: Not on file   Occupational History    Not on file   Social Needs    Financial resource strain: Not on file    Food insecurity:     Worry: Not on file     Inability: Not on file    Transportation needs:     Medical: Not on file     Non-medical: Not on file   Tobacco Use    Smoking status: Never Smoker    Smokeless tobacco: Never Used   Substance and Sexual Activity    Alcohol use: Yes     Comment: socially. luís    Drug use: No    Sexual activity: Yes     Partners: Male   Lifestyle    Physical activity:     Days per week: Not on file     Minutes per session: Not on file    Stress: Very much   Relationships    Social connections:     Talks on phone: Not on file      Gets together: Not on file     Attends Anabaptist service: Not on file     Active member of club or organization: Not on file     Attends meetings of clubs or organizations: Not on file     Relationship status: Not on file   Other Topics Concern    Not on file   Social History Narrative        2 sons    Works part time at [employer]     Past Surgical History:   Procedure Laterality Date    ARTHROPLASTY-SHOULDER Left 7/15/2016    [facility]    ARTHROPLASTY-SHOULDER Right 5/13/2016    [facility]    CHOLECYSTECTOMY      COLON SURGERY      HEEL SPUR SURGERY      JOINT REPLACEMENT Bilateral     hip    partial colectomy      for ulcerative colitis    SHOULDER SURGERY  05/2016    TONSILLECTOMY      total right hip replacement       Family History   Problem Relation Age of Onset    Arthritis Father     Heart disease Father        Review of Systems   Constitutional: Negative for chills, fatigue and fever.        Difficult historian   HENT: Negative for congestion, postnasal drip and sinus pressure.    Eyes: Negative for pain, discharge and visual disturbance.   Respiratory: Negative for cough, chest tightness and shortness of breath.    Cardiovascular: Negative for chest pain, palpitations and leg swelling.   Gastrointestinal: Positive for heartburn. Negative for abdominal distention, anal bleeding, constipation and diarrhea.   Musculoskeletal: Positive for gait problem and myalgias. Negative for joint swelling.   Skin: Negative for color change and pallor.   Allergic/Immunologic: Negative for environmental allergies.   Neurological: Negative for light-headedness.        RLS-takes generic requip- Requip does not help. She borrowed couple of gabapentin 300 mg each from her  which seems to help her.   Psychiatric/Behavioral: Positive for depression and sleep disturbance. The patient has insomnia. The patient is not  nervous/anxious.          Objective:      Blood pressure 107/60, pulse 66, height 5' (1.524 m), weight 64.4 kg (142 lb). Body mass index is 27.73 kg/m².  Physical Exam   Constitutional: She appears well-developed. She is cooperative. No distress.   HENT:   Head: Normocephalic and atraumatic.   Eyes: Pupils are equal, round, and reactive to light. Conjunctivae, EOM and lids are normal. Lids are everted and swept, no foreign bodies found. Right pupil is round and reactive. Left pupil is round and reactive.   Neck: Trachea normal and normal range of motion. Neck supple. No thyromegaly present.   Cardiovascular: Normal rate, regular rhythm, S1 normal, S2 normal and normal heart sounds.   Pulmonary/Chest: Breath sounds normal.   Abdominal: Soft. There is no rigidity.   Musculoskeletal: Tenderness: right kneeon range of motion and right hand.   Lymphadenopathy:     She has no cervical adenopathy.   Neurological: She is alert.   Skin: Skin is warm and dry.   Psychiatric: She is not actively hallucinating. Cognition and memory are impaired.   Rather flat affect. Soft-spoken. Has trouble comprehending my questions and answering them. She is inattentive.   Nursing note and vitals reviewed.        Assessment:       1. Fall, initial encounter    2. Traumatic injury of head, initial encounter    3. Dysthymia    4. Anxiety    5. RLS (restless legs syndrome)    6. Screening for breast cancer    7. Non-allergic rhinitis           No visits with results within 3 Month(s) from this visit.   Latest known visit with results is:   Office Visit on 04/24/2018   Component Date Value Ref Range Status    Hepatitis C Ab 04/24/2018 <0.1  0.0 - 0.9 s/co ratio Final    WBC 04/24/2018 7.9  5.0 - 10.0 K/uL Final    RBC 04/24/2018 4.81  3.50 - 5.50 M/uL Final    Hemoglobin 04/24/2018 14.0  12.0 - 15.0 g/dL Final    Hematocrit 04/24/2018 43.6  36.0 - 48.0 % Final    Mean Corpuscular Volume 04/24/2018 90.6  79.0 - 98.0 fL Final    Mean  Corpuscular Hemoglobin 04/24/2018 29.1  25.0 - 35.0 pg Final    Mean Corpuscular Hemoglobin Conc 04/24/2018 32.1  31.0 - 36.0 g/dL Final    RDW 04/24/2018 13.2  11.7 - 14.9 % Final    Platelets 04/24/2018 275  140 - 440 K/uL Final    MPV 04/24/2018 10.7  8.8 - 12.7 fL Final    Gran% 04/24/2018 58.4  % Final    Lymph% 04/24/2018 22.7  % Final    Mono% 04/24/2018 10.1  % Final    Eosinophil% 04/24/2018 7.4  % Final    Basophil% 04/24/2018 1.1  % Final    Gran # (ANC) 04/24/2018 4.6  1.4 - 6.5 K/uL Final    Lymph # 04/24/2018 1.8  1.2 - 3.4 K/uL Final    Mono # 04/24/2018 0.8* 0.1 - 0.6 K/uL Final    Eos # 04/24/2018 0.6  0.0 - 0.7 K/uL Final    Baso # 04/24/2018 0.1  0.0 - 0.2 K/uL Final    Immature Grans (Abs) 04/24/2018 0.0  0.0 - 1.0 K/uL Final    Immature Granulocytes 04/24/2018 0.3  % Final    nRBC% 04/24/2018 0  % Final    Glucose 04/24/2018 99  70 - 99 mg/dL Final    BUN, Bld 04/24/2018 23* 8 - 20 mg/dL Final    Creatinine 04/24/2018 0.75  0.60 - 1.40 mg/dL Final    Calcium 04/24/2018 9.3  7.7 - 10.4 mg/dL Final    Sodium 04/24/2018 137  134 - 144 mmol/L Final    Potassium 04/24/2018 4.4  3.5 - 5.0 mmol/L Final    Chloride 04/24/2018 104  98 - 110 mmol/L Final    CO2 04/24/2018 25.3  22.8 - 31.6 mmol/L Final    Albumin 04/24/2018 4.4  3.1 - 4.7 g/dL Final    Total Bilirubin 04/24/2018 0.5  0.3 - 1.0 mg/dL Final    Alkaline Phosphatase 04/24/2018 86  40 - 104 IU/L Final    Total Protein 04/24/2018 7.6  6.0 - 8.2 g/dL Final    ALT (SGPT) 04/24/2018 17  3 - 33 IU/L Final    AST 04/24/2018 19  10 - 40 IU/L Final    Ferritin 04/24/2018 63  24 - 162 ng/mL Final    CRP 04/24/2018 0.19  0.00 - 1.40 mg/dL Final         Plan:           Fall, initial encounter    Traumatic injury of head, initial encounter    Dysthymia  -     citalopram (CELEXA) 20 MG tablet; Take 0.5 tablets (10 mg total) by mouth once daily.  Dispense: 90 tablet; Refill: 0    Anxiety  -     citalopram (CELEXA) 20 MG  tablet; Take 0.5 tablets (10 mg total) by mouth once daily.  Dispense: 90 tablet; Refill: 0    RLS (restless legs syndrome)  -     gabapentin (NEURONTIN) 300 MG capsule; Take 2 capsules (600 mg total) by mouth every evening.  Dispense: 60 capsule; Refill: 5    Screening for breast cancer  -     Mammo Digital Screening Bilat; Future; Expected date: 06/11/2019    Non-allergic rhinitis  -     ipratropium (ATROVENT) 0.03 % nasal spray; 2 sprays by Nasal route 2 (two) times daily.  Dispense: 30 mL; Refill: 2      Advised Ms. Cousin about age and season appropriate immunizations/ cancer screenings.  Also seasonal influenza vaccine, update on tetanus diphtheria vaccination every 10 years.    Fall cautions    Change in meds    Multiple medical issues which are divergent in nature have been addressed.    Fall precautions are the most important thing at this point.    I've given her an you nasal spray ipratropium.  I'll see her back in one month time and see how she is doing.    Spent more than 25 minutes with patient which involved review of his medical conditions, labs, medications and with 50% of time face-to-face discussion about medical problems, management and any applicable changes.      Reduce citalopram to 10 mg at bedtime.    Discontinuing Patanol because it does not help her.    Continue trazodone 100 mg at bedtime to help her sleep. We may question why does she need anything to sleep. If she significantly depressed or anxious.    Get mammogram done.    Discontinue meloxicam.    Follow-up in one month.      Current Outpatient Medications:     calcium carbonate-vitamin D3 (CALTRATE 600 + D) 600 mg (1,500 mg)-800 unit Chew, Take 1 tablet by mouth 2 (two) times daily., Disp: 100 tablet, Rfl: 5    cetirizine (ZYRTEC) 10 MG tablet, Take 1 tablet (10 mg total) by mouth once daily., Disp: 90 tablet, Rfl: 0    citalopram (CELEXA) 20 MG tablet, Take 0.5 tablets (10 mg total) by mouth once daily., Disp: 90 tablet, Rfl:  0    meloxicam (MOBIC) 15 MG tablet, Take 1 tablet (15 mg total) by mouth once daily., Disp: 30 tablet, Rfl: 1    omeprazole (PRILOSEC) 20 MG capsule, TAKE 1 CAPSULE BY MOUTH IN THE EVENING, Disp: 90 capsule, Rfl: 0    traZODone (DESYREL) 50 MG tablet, Take 1 tablet (50 mg total) by mouth nightly., Disp: 90 tablet, Rfl: 0    gabapentin (NEURONTIN) 300 MG capsule, Take 2 capsules (600 mg total) by mouth every evening., Disp: 60 capsule, Rfl: 5    ipratropium (ATROVENT) 0.03 % nasal spray, 2 sprays by Nasal route 2 (two) times daily., Disp: 30 mL, Rfl: 2    triamcinolone acetonide 0.5% (KENALOG) 0.5 % Crea, Apply topically 2 (two) times daily. (Patient taking differently: Apply topically 2 (two) times daily as needed. ), Disp: 30 g, Rfl: 1

## 2019-06-11 NOTE — PATIENT INSTRUCTIONS
Preventing Falls: How to Prepare and What to Do    Falling is not something you want to think about. But it can make a big difference to plan ahead. If you're prepared, you'll know how to get help. And you'll be less likely to panic if you fall. This means you'll be able to do what's needed to get help right away.  How to prepare  · Have someone check on you daily.  · Keep a list of emergency numbers near the phone.  · Always have a way to call for help. Keep a cell phone with you at all times. Or talk with your healthcare provider about how to set up a home monitoring service. This involves wearing a small device around your neck or wrist. If you fall, you can press the button on the device. This alerts emergency responders.  · Talk with your healthcare provider about an exercise program that's right for you. Regular exercise may reduce the risk of falling and the risk for injury related to a fall.  · It's important to have good lighting in your home. Avoid using throw rugs, because they can raise your risk of tripping and falling. Add grab bars in the bathroom to help reduce the risk of falling. Small changes can make your home safer. Talk with your healthcare provider about making your home safer.  What to do if you fall  Above all, try to stay calm:  · If you start to fall, try to relax your body. This will reduce the impact of the fall.  · After you fall, press your monitor button, or phone for help.  · Don't rush to get up. First, make sure you're not hurt.  · Roll onto your side, then crawl to a chair. Pull yourself up onto the chair slowly.  · You should be checked if you struck your head, lost consciousness, were confused afterward, or have any other concerns for injury.  · Be sure to tell your doctor that you fell.  A note to family and friends  If you're with a loved one when he or she starts to fall, don't try to stop the fall. Ease the person to the floor carefully, so neither of you gets hurt. Don't  "leave the person alone. And don't try to move him or her. Put a pillow under his or her head. Check for injuries. If help is needed right away, be sure to call 911.   Date Last Reviewed: 6/13/2015  © 8714-8634 [employer]. [address] [place] [address]. All rights reserved. This information is not intended as a substitute for professional medical care. Always follow your healthcare professional's instructions.        Exercises to Prevent Falls  Certain types of exercises may help make you less likely to fall. Try the ones below. Or do other exercises that your health care provider suggests. Depending on your health, you may need to start slowly. Don't let that stop you. Even small amounts of exercise can help you. Be sure to talk to your health care provider before starting any exercise program.       Improve balance  Many types of exercise can help improve balance. Kasi chi and yoga are good examples. Here's another one to try. You can do it anytime and almost anywhere.  · Stand next to a counter or solid support.  · Push yourself up onto your tiptoes.  · Hold for 5 seconds. If you start to lose your balance, hold on to the counter.  · Rest and repeat 5 times. Work up to holding for 20 to 30 seconds, if you can. Increase flexibility  Being more flexible makes it easier for you to move around safely. Try exercises like the seated hamstring stretch.  · Sit in a chair and put one foot on a stool.  · Straighten your leg and reach with both hands down either side of your leg. Reach as far down your leg as you can.  · Hold for about 20 seconds.  · Go back to the starting position. Then repeat 5 times. Switch legs. Build strength  "Resistance" exercises help build strength. You can do them without equipment. Or you can use weights, elastic bands, or special machines. One such exercise is called the biceps curl. You can hold a 1-pound weight or even a can of soup. Do this exercise at least 3 times " a week. Strive for every day.  · Sit up straight in a chair.  · Keep your elbow close to your body and your wrist straight.  · Bend your arm, moving your hand up to your shoulder. Then slowly lower your arm.  · Repeat 5 times. Switch to the other arm.   Build your staying power  Aerobic exercises make your heart and lungs stronger so you can keep moving longer. Walking and swimming are two of the best types of exercises you can do. Using a stationary bike is great, too. Find an aerobic exercise that you enjoy. Start slowly and build up. Even 5 minutes is helpful. Aim for a goal of 30 minutes, at least 3 times a week. You don't have to do 30 minutes in 1 session. Break it up and walk a little throughout the day.  More helpful tips  · Start easy. Slowly work up to doing more.  · Talk with your health care provider about the best exercises for you.  · Call senior centers or health clubs about exercise programs.  · If needed, have a family member watch you walk every so often to check your stability.  · Exercise with a friend. Choose an activity you both enjoy.  · Consider nav chi or yoga to strengthen your balance.  · Try exercises that you can do anytime, anywhere. Here are 2 examples. Have someone with you when you first try these:  ¨ Practice walking by placing 1 foot right in front of the other.  ¨ Stand up and sit down 10 times. Repeat this throughout the day.   Date Last Reviewed: 6/13/2015  © 4440-0972 [employer]. [address] [place] [address], [place] [address]. All rights reserved. This information is not intended as a substitute for professional medical care. Always follow your healthcare professional's instructions.        Preventing Falls: Make Your Health a Priority    Having a health problem can make you more likely to fall. Taking certain kinds of medicines may also increase your risk of falls. So, improving your health can help you avoid a fall. Work with your healthcare provider to manage  health problems and to review your medicines. If you have your health under control, your risk of falling is lessened.  How chronic conditions increase your fall risk  Health problems like diabetes, high or low blood pressure, and arthritis are called chronic health conditions. They can be managed, but they don't go away. Chronic health problems put you at greater risk of a fall. This is because they can affect many parts of your body. They may cause problems with movement, balance, or vision. And certain medicines you take for them may have side effects, such as dizziness or drowsiness. These side effects also increase your risk.  Work with your healthcare provider  Your healthcare provider can work with you to help prevent a fall. See your healthcare provider for a medical exam each year. Go more often if you have symptoms, such as leg numbness or dizziness, that could raise your risk of falling. If you have a history of falls, let your provider know. Bring a list of your medicines to review with your healthcare provider. Discuss your nutrition and exercise routine. And ask whether you need any tests to assess your risk of falling.  Review your medicines  Medicines (even ones you buy over the counter) can cause side effects that lead to a fall. Common medicines that cause these kinds of side effects are blood pressure, heart, or pain medicines, medicines for sleep, and antidepressants. Store pain medicine in a secure area, such as an upper cabinet in your kitchen or bathroom. Don't mix different pills in the same bottle. Always store and travel with medicines in their original containers with clear labels. This will reduce the chance of taking the wrong medicine or too much of a medicine. Taking too much of a medicine or taking the wrong medicine may cause side effects that can increase your risk of falling.  Also, the way your body reacts to medicines can change as you age. So, certain medicines that were fine in  the past may cause side effects now. Your healthcare provider (such as your doctor or pharmacist) can help review your medicines and make changes if needed.  Get your eyes and ears checked  Problems with vision or hearing can lead to falls:  · Get your eyes checked at least once a year. Take time to adjust to new glasses.  · Get your hearing checked at least every other year.  · Have your doctor check your inner ear for problems that may affect your balance.  Get the right nutrition  If you don't get enough to eat or drink, you can become dizzy and fall:  · Your sense of thirst decreases with age. Drink water throughout the day.  · Eat breakfast. Plan regular meals.  · Ask your healthcare provider whether you need supplements. These can help strengthen your bones and muscles to help prevent falls. They can also help prevent fractures if you do fall.  Stay as active as you can  Staying active is one of the best things you can do to prevent falls. Keep in mind that doing too little can be as risky as doing too much. That's because not being active can make you weaker and more likely to fall. Balance, flexibility, strength, and endurance all come from exercise. They all play a role in preventing falls. Ask your healthcare provider which types of activity are right for you.  When to call your healthcare provider  Be sure to call your healthcare provider if you fall. Also call if you have any of these signs or symptoms (someone else may need to point them out to you):  · Feeling lightheaded or dizzy more than once a day  · Losing your balance often or feeling unsteady on your feet  · Feeling numbness in your legs or feet, or noticing a change in the way you walk  · Having a steady decline in your memory or mental sharpness   Date Last Reviewed: 6/13/2015  © 9814-5820 [employer]. [address] [place] [address], [place] [address]. All rights reserved. This information is not intended as a substitute for professional  medical care. Always follow your healthcare professional's instructions.        Preventing Falls: Moving Safely Out of a Chair and Bed     Sit up and wait for at least 60 seconds before standing.     You can move safely, whether youre at home, in a hospital, or out and about. Plan your movements. Dont rush. The phone or doorbell can wait. And learn how to perform daily tasks safely, like getting in and out of a chair or bed. The more careful you are, the less likely you are to fall.  Getting in and out of a chair  When you can, choose chairs with long armrests.  To sit down:  · Back up until you feel the chair against the backs of your legs.  · Grasp the armrests with both hands and sit down.  To stand up:  · Scoot to the edge of the chair. Place both feet firmly on the floor.  · Grasp the armrests or put both hands on your thighs and slowly push yourself up.  Getting out of bed  If you're using a hospital bed, make sure it's locked. Put the head of the bed up.  To get up:  · Move to the edge of the bed and roll onto your side. Push yourself up with your hand. At the same time, swing your legs over the side of the bed.  · Sit on the edge of the bed for at least 60 seconds before standing up.  · With both feet firmly on the floor, put your hands beside you on the bed and slowly push yourself up.  · If you feel lightheaded or dizzy, sit back down right away.  Date Last Reviewed: 6/12/2015 © 2000-2017 [employer]. [address] [place] [address], [place] [address]. All rights reserved. This information is not intended as a substitute for professional medical care. Always follow your healthcare professional's instructions.        Preventing Falls: Making Changes in Your Living Space  Is your living space filled with hazards that could cause you to fall? Changes can make you safer. They could even save your life. Take a careful look around your home. Change what you can on your own. Hire someone or ask friends or  family to help with harder tasks.      Be sure to add a nonslip mat to the inside of your shower or bathtub. Always keep a nightlight on. Keep a clear path from your bed to the bathroom. Move items from higher shelves to lower ones.   Remove hazards  · Remove things that can trip you, like throw rugs, boxes, piles of paper, or cords.  · Nail down rugs or carpeting if you don't want to remove them.  · Don't store items on stairs.  · Keep walkways clear.  · Clean up spills right away.  · Replace glass tables with wooden ones. They're safer if you fall.  Add safety devices  · Add handrails to both sides of stairs.  · Buy a raised toilet seat.  · Add grab bars near the toilet and in the shower.  · Get grabbers to help you reach things and avoid climbing.  Improve lighting  · Add nightlights to halls, bedrooms, and bathrooms.   · Put light switches at the top and bottom of stairs.  · Be sure each room and flight of stairs has proper lighting.  · Use shades or curtains to cut glare from windows.  · Put flashlights in each room. Replace burned-out bulbs.  · Get glowing light switches for room entrances.  Take other precautions  · Use nonskid floor wax.  · Buy a nonslip mat and a liquid soap dispenser for the shower.  · Tack down carpets or use slip-resistant backing.  · Put most-used items within easy reach.  · Add bright paint or tape on the top front edge of steps.  · Save big jobs, such as moving furniture or other heavy objects, for family or friends.  · Get professional help installing grab bars. They can be unsafe if not installed the right way.  Fix riskier rooms first  Don't tackle everything at once. Focus on one room at a time. The bathroom is a common spot for falls, so you may start there. Or start with a room you spend lots of time in, such as your bedroom. Make only a few changes at once. This will give you time to adjust to them.     Outside your home  You might arrange for these changes yourself, or you  might need to talk to your  or homeowners' association about them.  · Have loose boards on porches or damaged stairs repaired.  · Have rough edges, holes, or large cracks in sidewalks or driveways repaired.  · Have hazards that could trip you, such as hoses or pinky, removed.  · Use high-wattage light bulbs (100 or greater) near outside doors and stairs.  · Get handrails added to outside stairs. Have them extend beyond the bottom step.  · Get help in winter weather with ice or snow removal.   Date Last Reviewed: 6/12/2015  © 5091-8928 [employer]. [address] [place] [address], [place] [address]. All rights reserved. This information is not intended as a substitute for professional medical care. Always follow your healthcare professional's instructions.        Preventing Falls: Moving Safely Outside     When stepping off a curb with a walker, lower the walker onto the street first, then step off the curb.     Moving safely outside your home can be a challenge. Take care when walking up and down stairs and curbs. And be sure to wear sturdy, comfortable shoes and pay attention to where you step. Here are more tips to keep you from falling.  Using curbs and stairs  Curbs, steps, or uneven pavement can trip you. Take care when near them:  · Check the height of a curb before stepping up or down. Be careful with uneven and cut-out sections of curbs.  · Don't rush when crossing the street. Watch for changes in pavement height.  · On stairs, grasp the handrail and take one step at a time. If you ever feel dizzy on stairs, sit down until you feel better.  Wearing shoes that keep you safe  When you shop for shoes, keep these things in mind:  · Choose shoes with rubber or nonskid soles. Athletic shoes are a good choice.  · Choose flats or shoes with low heels. Avoid high heels or platforms.  · All footwear should be sturdy and well-fitting. Don't wear flip-flops or backless shoes or slippers.  · Don't  walk around in stocking feet. Shoes are your safest bet, even when indoors. If you like, keep 1 pair of shoes just for indoors.  Moving objects from place to place  Carrying objects can be hard, especially if you use a cane or walker. These tips can make it easier:  · Use a rolling cart to carry things like groceries.  · Wear clothes with large pockets for carrying small objects or wear a ora pack.  · Divide large loads into smaller loads. That way, you can always keep 1 hand free for grasping railings.  · Don't carry objects that block your view. That's a sure way to trip.   Date Last Reviewed: 6/13/2015  © 3596-2355 [employer]. [address] [place] [address], [place] [address]. All rights reserved. This information is not intended as a substitute for professional medical care. Always follow your healthcare professional's instructions.        Preventing Falls: Staying Active    Staying active is one of the best things you can do to prevent falls. Keep in mind that doing too little can be as risky as doing too much. That's because not being active can make you weaker and more likely to fall. But how much can you do safely? Start easy. Slowly work up to doing more. Talk to your health care provider about safe ways for you to stay active.  Stay active, stay connected  Staying connected with other people can help lower your risk of falls. One way it does this is by helping to keep you from feeling isolated and depressed. Find a social activity you enjoy. Make it part of your weekly or daily routine:  · Join a club or visit a senior center.  · Go to Judaism services.  · Organize a [email or web address] or game of cards.  · Garden with your neighbor.  · Have a friend join you to go walking outdoors or in the mall.  How exercise helps  The list of benefits from exercise just keeps getting longer. And, as you age, you can keep reaping those rewards. Balance, flexibility, strength, and endurance all come from exercise. They  all play a role in preventing falls. It's never too late to start exercising. Try organized activities. You can find these at senior centers, health clubs, or even at a [employer], temple, or Judaism. This approach may work best if you've never exercised in the past or if you need company to get motivated. But you can exercise on your own if you prefer. Try an exercise video or walk in the park.  Date Last Reviewed: 6/13/2015 © 2000-2017 [employer]. [address] [place] [address]. All rights reserved. This information is not intended as a substitute for professional medical care. Always follow your healthcare professional's instructions.        Preventing Falls: Moving Safely Using a Cane or Walker     When using a cane, keep it away from your feet so you dont trip.     A walking aid, such as a cane or walker, can help you stay more independent and avoid falls. Remember to keep your walking aid within easy reach when you're in a chair or in bed. And learn how to use it safely so you don't injure yourself. Be sure the cane or walker is the correct height. Hang your arm loosely at your side, and measure the distance from your wrist to the floor. The distance should be the same as the height of your cane or walker.  Using a cane  If you have a stronger side, hold the cane on the side of your stronger leg.  1. Get your balance.  2. Move the cane and your weaker leg forward.  3. Support your weight on both the cane and your weaker side.  4. Step with your stronger leg.  5. Start again from step 1.  Using a walker  1. Roll the walker (or lift it, if you're using one without wheels) forward about 12 inches.  2. Step forward with your weaker leg first.  3. Use the walker to help keep your balance.  4. Bring your other foot forward to the center of the walker.  5. Start again from step 1.  Helpful tips  · Check with your health care provider about the right walking aid to use. Ask about a walker with a  seat attached.  · Check the tips of your cane or walker to make sure they have nonskid covers.  · Move slowly from room to room. Don't rush.  · Sit down to get dressed.  · Use a ora pack or backpack to keep your hands free.  · Get help for jobs that mean climbing, even on a stepstool.  · Do not try going up or down stairs using a walker.   Date Last Reviewed: 6/12/2015  © 9025-6379 [employer]. [address] [place] [address], [place] [address]. All rights reserved. This information is not intended as a substitute for professional medical care. Always follow your healthcare professional's instructions.

## 2019-07-11 ENCOUNTER — OFFICE VISIT (OUTPATIENT)
Dept: FAMILY MEDICINE | Facility: CLINIC | Age: 69
End: 2019-07-11
Payer: MEDICARE

## 2019-07-11 VITALS
WEIGHT: 143 LBS | BODY MASS INDEX: 28.07 KG/M2 | HEIGHT: 60 IN | SYSTOLIC BLOOD PRESSURE: 110 MMHG | DIASTOLIC BLOOD PRESSURE: 64 MMHG | HEART RATE: 77 BPM
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
## 2019-07-11 PROCEDURE — 99213 OFFICE O/P EST LOW 20 MIN: CPT | Mod: ,,, | Performed by: INTERNAL MEDICINE

## 2019-07-11 PROCEDURE — 1101F PR PT FALLS ASSESS DOC 0-1 FALLS W/OUT INJ PAST YR: ICD-10-PCS | Mod: ,,, | Performed by: INTERNAL MEDICINE

## 2019-07-11 PROCEDURE — 1101F PT FALLS ASSESS-DOCD LE1/YR: CPT | Mod: ,,, | Performed by: INTERNAL MEDICINE

## 2019-07-11 PROCEDURE — 99213 PR OFFICE/OUTPT VISIT, EST, LEVL III, 20-29 MIN: ICD-10-PCS | Mod: ,,, | Performed by: INTERNAL MEDICINE

## 2019-07-11 NOTE — PATIENT INSTRUCTIONS
Anxiety Reaction  Anxiety is the feeling we all get when we think something bad might happen. It is a normal response to stress and usually causes only a mild reaction. When anxiety becomes more severe, it can interfere with daily life. In some cases, you may not even be aware of what it is youre anxious about. There may also be a genetic link or it may be a learned behavior in the home.  Both psychological and physical triggers cause stress reaction. It's often a response to fear or emotional stress, real or imagined. This stress may come from home, family, work, or social relationships.  During an anxiety reaction, you may feel:  · Helpless  · Nervous  · Depressed  · Irritable  Your body may show signs of anxiety in many ways. You may experience:  · Dry mouth  · Shakiness  · Dizziness  · Weakness  · Trouble breathing  · Breathing fast (hyperventilating)  · Chest pressure  · Sweating  · Headache  · Nausea  · Diarrhea  · Tiredness  · Inability to sleep  · Sexual problems  Home care  · Try to locate the sources of stress in your life. They may not be obvious. These may include:  ¨ Daily hassles of life (traffic jams, missed appointments, car troubles, etc.)  ¨ Major life changes, both good (new baby, job promotion) and bad (loss of job, loss of loved one)  ¨ Overload: feeling that you have too many responsibilities and can't take care of all of them at once  ¨ Feeling helpless, feeling that your problems are beyond what youre able to solve  · Notice how your body reacts to stress. Learn to listen to your body signals. This will help you take action before the stress becomes severe.  · When you can, do something about the source of your stress. (Avoid hassles, limit the amount of change that happens in your life at one time and take a break when you feel overloaded).  · Unfortunately, many stressful situations can't be avoided. It is necessary to learn how to better manage stress. There are many proven methods  that will reduce your anxiety. These include simple things like exercise, good nutrition and adequate rest. Also, there are certain techniques that are helpful:  ¨ Relaxation  ¨ Breathing exercises  ¨ Visualization  ¨ Biofeedback  ¨ Meditation  For more information about this, consult your doctor or go to a local bookstore and review the many books and tapes available on this subject.  Follow-up care  If you feel that your anxiety is not responding to self-help measures, contact your doctor or make an appointment with a counselor. You may need short-term psychological counseling and temporary medicine to help you manage stress.  Call 911  Call your healthcare provider right away if any of these occur:  · Trouble breathing  · Confusion  · Drowsiness or trouble wakening  · Fainting or loss of consciousness  · Rapid heart rate  · Seizure  · New chest pain that becomes more severe, lasts longer, or spreads into your shoulder, arm, neck, jaw, or back  When to seek medical advice  Call your healthcare provider right away if any of these occur:  · Your symptoms get worse  · Severe headache not relieved by rest and mild pain reliever  Date Last Reviewed: 9/29/2015  © 8186-6161 [employer] [address] [place] [address]. All rights reserved. This information is not intended as a substitute for professional medical care. Always follow your healthcare professional's instructions.        Preventing Falls: Staying Active    Staying active is one of the best things you can do to prevent falls. Keep in mind that doing too little can be as risky as doing too much. That's because not being active can make you weaker and more likely to fall. But how much can you do safely? Start easy. Slowly work up to doing more. Talk to your health care provider about safe ways for you to stay active.  Stay active, stay connected  Staying connected with other people can help lower your risk of falls. One way it does this  is by helping to keep you from feeling isolated and depressed. Find a social activity you enjoy. Make it part of your weekly or daily routine:  · Join a club or visit a senior center.  · Go to Taoism services.  · Organize a pot[employer] or game of cards.  · Garden with your neighbor.  · Have a friend join you to go walking outdoors or in the mall.  How exercise helps  The list of benefits from exercise just keeps getting longer. And, as you age, you can keep reaping those rewards. Balance, flexibility, strength, and endurance all come from exercise. They all play a role in preventing falls. It's never too late to start exercising. Try organized activities. You can find these at senior centers, health clubs, or even at a Confucianist, temple, or Confucianist. This approach may work best if you've never exercised in the past or if you need company to get motivated. But you can exercise on your own if you prefer. Try an exercise video or walk in the park.  Date Last Reviewed: 6/13/2015  © 4398-4026 [employer]. [address] [place] [address], [place] [address]. All rights reserved. This information is not intended as a substitute for professional medical care. Always follow your healthcare professional's instructions.

## 2019-07-11 NOTE — PROGRESS NOTES
Subjective:       Patient ID: Shweta Cousin is a 68 y.o. female.    Chief Complaint: balance (fup)    Ms. shweta vargas is a 68-year-old  female comes for follow-up.  Previous visit she was seen for a history of fall and we made some adjustments in medications but reducing them.  She is doing reasonably okay at this point.  She has not fallen recently.    Fall   The accident occurred more than 1 week ago. The fall occurred in unknown circumstances. She fell from a height of 1 to 2 ft. Pertinent negatives include no fever. Treatments tried: reducing meds. The treatment provided moderate relief.       Past Medical History:   Diagnosis Date    Abdominal pain, acute     Abdominal wall mass of epigastric region     Acid reflux     Acute low back pain     Annual physical exam     Anxiety     Bilateral shoulder pain     Body mass index (bmi) 31.0-31.9, adult     Cervical spondylosis     Chronic right shoulder pain     Decreased GFR     Depression     Hearing aid worn     bilateral    History of ulcerative colitis     History of vitamin D deficiency 4/24/2018    HNP (herniated nucleus pulposus), lumbar     Hyperlipidemia     Insomnia     Kidney stone     Left-sided low back pain without sciatica     Non-smoker     Osteoarthritis     Osteoarthritis of left glenohumeral joint     Osteopenia     Osteoporosis screening     Palpable abdominal mass     Peripheral neuropathy     Plantar fasciitis of left foot     RLS (restless legs syndrome)     Screening for colorectal cancer     Tear of right rotator cuff     Visit for screening mammogram      Social History     Socioeconomic History    Marital status:      Spouse name: Not on file    Number of children: 2    Years of education: Not on file    Highest education level: Not on file   Occupational History    Not on file   Social Needs    Financial resource strain: Not on file    Food insecurity:     Worry: Not on file      Inability: Not on file    Transportation needs:     Medical: Not on file     Non-medical: Not on file   Tobacco Use    Smoking status: Never Smoker    Smokeless tobacco: Never Used   Substance and Sexual Activity    Alcohol use: Yes     Comment: kaya luís    Drug use: No    Sexual activity: Yes     Partners: Male   Lifestyle    Physical activity:     Days per week: Not on file     Minutes per session: Not on file    Stress: Very much   Relationships    Social connections:     Talks on phone: Not on file     Gets together: Not on file     Attends Tenriism service: Not on file     Active member of club or organization: Not on file     Attends meetings of clubs or organizations: Not on file     Relationship status: Not on file   Other Topics Concern    Not on file   Social History Narrative        2 sons    Works part time at department store     Past Surgical History:   Procedure Laterality Date    ARTHROPLASTY-SHOULDER Left 7/15/2016    [facility]    ARTHROPLASTY-SHOULDER Right 5/13/2016    [facility]    CHOLECYSTECTOMY      COLON SURGERY      HEEL SPUR SURGERY      JOINT REPLACEMENT Bilateral     hip    partial colectomy      for ulcerative colitis    SHOULDER SURGERY  05/2016    TONSILLECTOMY      total right hip replacement       Family History   Problem Relation Age of Onset    Arthritis Father     Heart disease Father        Review of Systems   Constitutional: Negative for chills, fatigue and fever.        Difficult historian   HENT: Negative for congestion, postnasal drip and sinus pressure.    Eyes: Negative for pain, discharge and visual disturbance.   Respiratory: Negative for cough, chest tightness and shortness of breath.    Cardiovascular: Negative for chest pain, palpitations and leg swelling.   Gastrointestinal: Negative for abdominal distention, anal bleeding, constipation and  diarrhea.   Musculoskeletal: Positive for gait problem and myalgias. Negative for joint swelling.   Skin: Negative for color change and pallor.   Allergic/Immunologic: Negative for environmental allergies.   Neurological: Negative for light-headedness.        RLS-takes generic requip- Requip does not help. She borrowed couple of gabapentin 300 mg each from her  which seems to help her.   Psychiatric/Behavioral: Positive for sleep disturbance. The patient is not nervous/anxious.          Objective:      Blood pressure 110/64, pulse 77, height 5' (1.524 m), weight 64.9 kg (143 lb). Body mass index is 27.93 kg/m².  Physical Exam   Constitutional: She appears well-developed. She is cooperative. No distress.   HENT:   Head: Normocephalic and atraumatic.   Eyes: Conjunctivae, EOM and lids are normal. Lids are everted and swept, no foreign bodies found. Right pupil is round and reactive. Left pupil is round and reactive.   Neck: Trachea normal and normal range of motion. Neck supple. No thyromegaly present.   Cardiovascular: Normal rate, regular rhythm, S1 normal, S2 normal and normal heart sounds.   Pulmonary/Chest: Breath sounds normal.   Abdominal: Soft. There is no rigidity.   Musculoskeletal: She exhibits no edema or deformity.   Lymphadenopathy:     She has no cervical adenopathy.   Neurological: She is alert.   Skin: Skin is warm and dry.   Psychiatric: She is not actively hallucinating. Cognition and memory are impaired.   Rather flat affect. Soft-spoken. Some cognitive issues She is inattentive.   Nursing note and vitals reviewed.        Assessment:       1. Fall, subsequent encounter           No visits with results within 3 Month(s) from this visit.   Latest known visit with results is:   Office Visit on 04/24/2018   Component Date Value Ref Range Status    Hepatitis C Ab 04/24/2018 <0.1  0.0 - 0.9 s/co ratio Final    WBC 04/24/2018 7.9  5.0 - 10.0 K/uL Final    RBC 04/24/2018 4.81  3.50 - 5.50 M/uL  Final    Hemoglobin 04/24/2018 14.0  12.0 - 15.0 g/dL Final    Hematocrit 04/24/2018 43.6  36.0 - 48.0 % Final    Mean Corpuscular Volume 04/24/2018 90.6  79.0 - 98.0 fL Final    Mean Corpuscular Hemoglobin 04/24/2018 29.1  25.0 - 35.0 pg Final    Mean Corpuscular Hemoglobin Conc 04/24/2018 32.1  31.0 - 36.0 g/dL Final    RDW 04/24/2018 13.2  11.7 - 14.9 % Final    Platelets 04/24/2018 275  140 - 440 K/uL Final    MPV 04/24/2018 10.7  8.8 - 12.7 fL Final    Gran% 04/24/2018 58.4  % Final    Lymph% 04/24/2018 22.7  % Final    Mono% 04/24/2018 10.1  % Final    Eosinophil% 04/24/2018 7.4  % Final    Basophil% 04/24/2018 1.1  % Final    Gran # (ANC) 04/24/2018 4.6  1.4 - 6.5 K/uL Final    Lymph # 04/24/2018 1.8  1.2 - 3.4 K/uL Final    Mono # 04/24/2018 0.8* 0.1 - 0.6 K/uL Final    Eos # 04/24/2018 0.6  0.0 - 0.7 K/uL Final    Baso # 04/24/2018 0.1  0.0 - 0.2 K/uL Final    Immature Grans (Abs) 04/24/2018 0.0  0.0 - 1.0 K/uL Final    Immature Granulocytes 04/24/2018 0.3  % Final    nRBC% 04/24/2018 0  % Final    Glucose 04/24/2018 99  70 - 99 mg/dL Final    BUN, Bld 04/24/2018 23* 8 - 20 mg/dL Final    Creatinine 04/24/2018 0.75  0.60 - 1.40 mg/dL Final    Calcium 04/24/2018 9.3  7.7 - 10.4 mg/dL Final    Sodium 04/24/2018 137  134 - 144 mmol/L Final    Potassium 04/24/2018 4.4  3.5 - 5.0 mmol/L Final    Chloride 04/24/2018 104  98 - 110 mmol/L Final    CO2 04/24/2018 25.3  22.8 - 31.6 mmol/L Final    Albumin 04/24/2018 4.4  3.1 - 4.7 g/dL Final    Total Bilirubin 04/24/2018 0.5  0.3 - 1.0 mg/dL Final    Alkaline Phosphatase 04/24/2018 86  40 - 104 IU/L Final    Total Protein 04/24/2018 7.6  6.0 - 8.2 g/dL Final    ALT (SGPT) 04/24/2018 17  3 - 33 IU/L Final    AST 04/24/2018 19  10 - 40 IU/L Final    Ferritin 04/24/2018 63  24 - 162 ng/mL Final    CRP 04/24/2018 0.19  0.00 - 1.40 mg/dL Final         Plan:           Fall, subsequent encounter    Fall precautions discussed.    Minimize  medications    Advised Ms. Cousin about age and season appropriate immunizations/ cancer screenings.  Also seasonal influenza vaccine, update on tetanus diphtheria vaccination every 10 years.              Current Outpatient Medications:     calcium carbonate-vitamin D3 (CALTRATE 600 + D) 600 mg (1,500 mg)-800 unit Chew, Take 1 tablet by mouth 2 (two) times daily., Disp: 100 tablet, Rfl: 5    cetirizine (ZYRTEC) 10 MG tablet, Take 1 tablet (10 mg total) by mouth once daily., Disp: 90 tablet, Rfl: 0    citalopram (CELEXA) 20 MG tablet, Take 0.5 tablets (10 mg total) by mouth once daily., Disp: 90 tablet, Rfl: 0    gabapentin (NEURONTIN) 300 MG capsule, Take 2 capsules (600 mg total) by mouth every evening., Disp: 60 capsule, Rfl: 5    ipratropium (ATROVENT) 0.03 % nasal spray, 2 sprays by Nasal route 2 (two) times daily., Disp: 30 mL, Rfl: 2    meloxicam (MOBIC) 15 MG tablet, Take 1 tablet (15 mg total) by mouth once daily., Disp: 30 tablet, Rfl: 1    omeprazole (PRILOSEC) 20 MG capsule, TAKE 1 CAPSULE BY MOUTH IN THE EVENING, Disp: 90 capsule, Rfl: 0    traZODone (DESYREL) 50 MG tablet, Take 1 tablet (50 mg total) by mouth nightly., Disp: 90 tablet, Rfl: 0    triamcinolone acetonide 0.5% (KENALOG) 0.5 % Crea, Apply topically 2 (two) times daily. (Patient taking differently: Apply topically 2 (two) times daily as needed. ), Disp: 30 g, Rfl: 1

## 2019-07-13 RX ORDER — TRAZODONE HYDROCHLORIDE 50 MG/1
TABLET ORAL
Qty: 90 TABLET | Refills: 1 | Status: SHIPPED | OUTPATIENT
Start: 2019-07-13 | End: 2020-01-02 | Stop reason: SDUPTHER

## 2019-07-30 DIAGNOSIS — G25.81 RLS (RESTLESS LEGS SYNDROME): ICD-10-CM

## 2019-07-30 DIAGNOSIS — J31.0 NON-ALLERGIC RHINITIS: ICD-10-CM

## 2019-07-30 RX ORDER — IPRATROPIUM BROMIDE 21 UG/1
2 SPRAY, METERED NASAL 2 TIMES DAILY
Qty: 30 ML | Refills: 2 | Status: SHIPPED | OUTPATIENT
Start: 2019-07-30

## 2019-07-30 RX ORDER — GABAPENTIN 300 MG/1
600 CAPSULE ORAL NIGHTLY
Qty: 60 CAPSULE | Refills: 5 | Status: SHIPPED | OUTPATIENT
Start: 2019-07-30 | End: 2019-09-09 | Stop reason: SDUPTHER

## 2019-09-09 DIAGNOSIS — G25.81 RLS (RESTLESS LEGS SYNDROME): ICD-10-CM

## 2019-09-09 RX ORDER — GABAPENTIN 300 MG/1
600 CAPSULE ORAL NIGHTLY
Qty: 180 CAPSULE | Refills: 1 | Status: SHIPPED | OUTPATIENT
Start: 2019-09-09 | End: 2020-01-02 | Stop reason: SDUPTHER

## 2019-09-18 DIAGNOSIS — F41.9 ANXIETY: ICD-10-CM

## 2019-09-18 DIAGNOSIS — F34.1 DYSTHYMIA: ICD-10-CM

## 2019-09-18 RX ORDER — CITALOPRAM 20 MG/1
TABLET, FILM COATED ORAL
Qty: 90 TABLET | Refills: 2 | Status: SHIPPED | OUTPATIENT
Start: 2019-09-18 | End: 2020-06-19

## 2019-09-26 DIAGNOSIS — K21.9 GASTROESOPHAGEAL REFLUX DISEASE WITHOUT ESOPHAGITIS: ICD-10-CM

## 2019-09-26 RX ORDER — ROPINIROLE 4 MG/1
TABLET, FILM COATED ORAL
Qty: 120 TABLET | Refills: 1 | Status: SHIPPED | OUTPATIENT
Start: 2019-09-26 | End: 2019-09-30 | Stop reason: SDUPTHER

## 2019-09-26 RX ORDER — OMEPRAZOLE 20 MG/1
CAPSULE, DELAYED RELEASE ORAL
Qty: 90 CAPSULE | Refills: 0 | Status: SHIPPED | OUTPATIENT
Start: 2019-09-26 | End: 2020-01-02 | Stop reason: SDUPTHER

## 2019-09-30 ENCOUNTER — TELEPHONE (OUTPATIENT)
Dept: FAMILY MEDICINE | Facility: CLINIC | Age: 69
End: 2019-09-30

## 2019-09-30 RX ORDER — ROPINIROLE 4 MG/1
4 TABLET, FILM COATED ORAL 2 TIMES DAILY
Qty: 20 TABLET | Refills: 1 | Status: SHIPPED | OUTPATIENT
Start: 2019-09-30 | End: 2020-01-02 | Stop reason: SDUPTHER

## 2019-10-09 ENCOUNTER — OFFICE VISIT (OUTPATIENT)
Dept: FAMILY MEDICINE | Facility: CLINIC | Age: 69
End: 2019-10-09
Payer: MEDICARE

## 2019-10-09 VITALS
SYSTOLIC BLOOD PRESSURE: 104 MMHG | HEART RATE: 76 BPM | HEIGHT: 60 IN | WEIGHT: 142 LBS | BODY MASS INDEX: 27.88 KG/M2 | DIASTOLIC BLOOD PRESSURE: 65 MMHG

## 2019-10-09 DIAGNOSIS — Z23 NEEDS FLU SHOT: ICD-10-CM

## 2019-10-09 DIAGNOSIS — G89.29 CHRONIC RIGHT-SIDED LOW BACK PAIN WITHOUT SCIATICA: ICD-10-CM

## 2019-10-09 DIAGNOSIS — M54.50 CHRONIC RIGHT-SIDED LOW BACK PAIN WITHOUT SCIATICA: ICD-10-CM

## 2019-10-09 DIAGNOSIS — M17.12 PRIMARY OSTEOARTHRITIS OF LEFT KNEE: ICD-10-CM

## 2019-10-09 DIAGNOSIS — M54.9 MID BACK PAIN: Primary | Chronic | ICD-10-CM

## 2019-10-09 PROCEDURE — G0008 FLU VACCINE - HIGH DOSE (65+) PRESERVATIVE FREE IM: ICD-10-PCS | Mod: S$GLB,,, | Performed by: INTERNAL MEDICINE

## 2019-10-09 PROCEDURE — 90662 FLU VACCINE - HIGH DOSE (65+) PRESERVATIVE FREE IM: ICD-10-PCS | Mod: S$GLB,,, | Performed by: INTERNAL MEDICINE

## 2019-10-09 PROCEDURE — G0008 ADMIN INFLUENZA VIRUS VAC: HCPCS | Mod: S$GLB,,, | Performed by: INTERNAL MEDICINE

## 2019-10-09 PROCEDURE — 99213 PR OFFICE/OUTPT VISIT, EST, LEVL III, 20-29 MIN: ICD-10-PCS | Mod: 25,S$GLB,, | Performed by: INTERNAL MEDICINE

## 2019-10-09 PROCEDURE — 90662 IIV NO PRSV INCREASED AG IM: CPT | Mod: S$GLB,,, | Performed by: INTERNAL MEDICINE

## 2019-10-09 PROCEDURE — 99213 OFFICE O/P EST LOW 20 MIN: CPT | Mod: 25,S$GLB,, | Performed by: INTERNAL MEDICINE

## 2019-10-09 PROCEDURE — 1101F PT FALLS ASSESS-DOCD LE1/YR: CPT | Mod: S$GLB,,, | Performed by: INTERNAL MEDICINE

## 2019-10-09 PROCEDURE — 1101F PR PT FALLS ASSESS DOC 0-1 FALLS W/OUT INJ PAST YR: ICD-10-PCS | Mod: S$GLB,,, | Performed by: INTERNAL MEDICINE

## 2019-10-09 RX ORDER — MELOXICAM 15 MG/1
15 TABLET ORAL DAILY
Qty: 90 TABLET | Refills: 1 | Status: SHIPPED | OUTPATIENT
Start: 2019-10-09 | End: 2020-01-06

## 2019-10-09 NOTE — PROGRESS NOTES
Subjective:       Patient ID: Vesta Cousin is a 69 y.o. female.    Chief Complaint: Arthritis and Insomnia    Arthritis   Presents for follow-up visit. She complains of pain. She reports no joint swelling. Affected locations include the left knee. Her pain is at a severity of 4/10. Associated symptoms include pain at night. Pertinent negatives include no diarrhea, dysuria, fatigue or fever.   Back Pain   This is a chronic problem. The current episode started more than 1 month ago. The problem occurs constantly. The problem is unchanged. The pain is present in the gluteal and lumbar spine. The pain does not radiate. The pain is mild. Pertinent negatives include no chest pain, dysuria, fever, headaches, perianal numbness or tingling. Risk factors include menopause. The treatment provided no relief.   Knee Pain    The incident occurred more than 1 week ago. There was no injury mechanism. The pain is present in the left leg and left knee. The quality of the pain is described as aching. The pain is at a severity of 4/10. The pain is moderate. The pain has been fluctuating since onset. Pertinent negatives include no tingling. She has tried NSAIDs (Meloxicam) for the symptoms. The treatment provided mild relief.       Past Medical History:   Diagnosis Date    Abdominal pain, acute     Abdominal wall mass of epigastric region     Acid reflux     Acute low back pain     Annual physical exam     Anxiety     Bilateral shoulder pain     Body mass index (bmi) 31.0-31.9, adult     Cervical spondylosis     Chronic right shoulder pain     Decreased GFR     Depression     Hearing aid worn     bilateral    History of ulcerative colitis     History of vitamin D deficiency 4/24/2018    HNP (herniated nucleus pulposus), lumbar     Hyperlipidemia     Insomnia     Kidney stone     Left-sided low back pain without sciatica     Non-smoker     Osteoarthritis     Osteoarthritis of left glenohumeral joint     Osteopenia      Osteoporosis screening     Palpable abdominal mass     Peripheral neuropathy     Plantar fasciitis of left foot     RLS (restless legs syndrome)     Screening for colorectal cancer     Tear of right rotator cuff     Visit for screening mammogram      Social History     Socioeconomic History    Marital status:      Spouse name: Not on file    Number of children: 2    Years of education: Not on file    Highest education level: Not on file   Occupational History    Not on file   Social Needs    Financial resource strain: Not on file    Food insecurity:     Worry: Not on file     Inability: Not on file    Transportation needs:     Medical: Not on file     Non-medical: Not on file   Tobacco Use    Smoking status: Never Smoker    Smokeless tobacco: Never Used   Substance and Sexual Activity    Alcohol use: Yes     Comment: socially. luís    Drug use: No    Sexual activity: Yes     Partners: Male   Lifestyle    Physical activity:     Days per week: Not on file     Minutes per session: Not on file    Stress: Very much   Relationships    Social connections:     Talks on phone: Not on file     Gets together: Not on file     Attends Orthodoxy service: Not on file     Active member of club or organization: Not on file     Attends meetings of clubs or organizations: Not on file     Relationship status: Not on file   Other Topics Concern    Not on file   Social History Narrative        2 sons    Works part time at [employer]     Past Surgical History:   Procedure Laterality Date    CHOLECYSTECTOMY      COLON SURGERY      HEEL SPUR SURGERY      JOINT REPLACEMENT Bilateral     hip    partial colectomy      for ulcerative colitis    SHOULDER SURGERY  05/2016    TONSILLECTOMY      total right hip replacement       Family History   Problem Relation Age of Onset    Arthritis Father     Heart disease Father        Review of Systems   Constitutional: Negative for chills,  fatigue and fever.        Difficult historian   HENT: Negative for congestion, postnasal drip and sinus pressure.    Eyes: Negative for pain, discharge and visual disturbance.   Respiratory: Negative for cough, chest tightness and shortness of breath.    Cardiovascular: Negative for chest pain, palpitations and leg swelling.   Gastrointestinal: Negative for abdominal distention, anal bleeding, constipation and diarrhea.   Genitourinary: Negative for difficulty urinating, dysuria, flank pain and frequency.   Musculoskeletal: Positive for arthralgias, arthritis, back pain, gait problem and myalgias. Negative for joint swelling.   Skin: Negative for color change and pallor.   Allergic/Immunologic: Negative for environmental allergies.   Neurological: Negative for dizziness, tingling, facial asymmetry, light-headedness and headaches.        RLS-takes generic requip- Requip does not help. In past She borrowed couple of gabapentin 300 mg each from her  which seems to help her.   Psychiatric/Behavioral: Positive for sleep disturbance. Negative for confusion. The patient is not nervous/anxious.          Objective:      Blood pressure 104/65, pulse 76, height 5' (1.524 m), weight 64.4 kg (142 lb). Body mass index is 27.73 kg/m².  Physical Exam   Constitutional: She appears well-developed. She is cooperative. No distress.   HENT:   Head: Normocephalic and atraumatic.   Eyes: Conjunctivae, EOM and lids are normal. Lids are everted and swept, no foreign bodies found. Right pupil is round and reactive. Left pupil is round and reactive.   Neck: Trachea normal and normal range of motion. Neck supple. No thyromegaly present.   Cardiovascular: Normal rate, regular rhythm, S1 normal, S2 normal and normal heart sounds.   Pulmonary/Chest: Breath sounds normal. No respiratory distress.   Abdominal: Soft. There is no rigidity.   Musculoskeletal: She exhibits no edema or deformity.        Lumbar back: She exhibits normal range of  motion, no tenderness and no swelling.        Back:         Legs:  Lymphadenopathy:     She has no cervical adenopathy.   Neurological: She is alert.   Skin: Skin is warm and dry.   Psychiatric: She is not actively hallucinating. Cognition and memory are impaired.   Rather flat affect. Soft-spoken. Some cognitive issues She is inattentive.   Nursing note and vitals reviewed.        Assessment:       1. Mid back pain    2. Chronic right-sided low back pain without sciatica    3. Primary osteoarthritis of left knee    4. Needs flu shot           No visits with results within 3 Month(s) from this visit.   Latest known visit with results is:   Office Visit on 04/24/2018   Component Date Value Ref Range Status    Hepatitis C Ab 04/24/2018 <0.1  0.0 - 0.9 s/co ratio Final    WBC 04/24/2018 7.9  5.0 - 10.0 K/uL Final    RBC 04/24/2018 4.81  3.50 - 5.50 M/uL Final    Hemoglobin 04/24/2018 14.0  12.0 - 15.0 g/dL Final    Hematocrit 04/24/2018 43.6  36.0 - 48.0 % Final    Mean Corpuscular Volume 04/24/2018 90.6  79.0 - 98.0 fL Final    Mean Corpuscular Hemoglobin 04/24/2018 29.1  25.0 - 35.0 pg Final    Mean Corpuscular Hemoglobin Conc 04/24/2018 32.1  31.0 - 36.0 g/dL Final    RDW 04/24/2018 13.2  11.7 - 14.9 % Final    Platelets 04/24/2018 275  140 - 440 K/uL Final    MPV 04/24/2018 10.7  8.8 - 12.7 fL Final    Gran% 04/24/2018 58.4  % Final    Lymph% 04/24/2018 22.7  % Final    Mono% 04/24/2018 10.1  % Final    Eosinophil% 04/24/2018 7.4  % Final    Basophil% 04/24/2018 1.1  % Final    Gran # (ANC) 04/24/2018 4.6  1.4 - 6.5 K/uL Final    Lymph # 04/24/2018 1.8  1.2 - 3.4 K/uL Final    Mono # 04/24/2018 0.8* 0.1 - 0.6 K/uL Final    Eos # 04/24/2018 0.6  0.0 - 0.7 K/uL Final    Baso # 04/24/2018 0.1  0.0 - 0.2 K/uL Final    Immature Grans (Abs) 04/24/2018 0.0  0.0 - 1.0 K/uL Final    Immature Granulocytes 04/24/2018 0.3  % Final    nRBC% 04/24/2018 0  % Final    Glucose 04/24/2018 99  70 - 99 mg/dL  Final    BUN, Bld 04/24/2018 23* 8 - 20 mg/dL Final    Creatinine 04/24/2018 0.75  0.60 - 1.40 mg/dL Final    Calcium 04/24/2018 9.3  7.7 - 10.4 mg/dL Final    Sodium 04/24/2018 137  134 - 144 mmol/L Final    Potassium 04/24/2018 4.4  3.5 - 5.0 mmol/L Final    Chloride 04/24/2018 104  98 - 110 mmol/L Final    CO2 04/24/2018 25.3  22.8 - 31.6 mmol/L Final    Albumin 04/24/2018 4.4  3.1 - 4.7 g/dL Final    Total Bilirubin 04/24/2018 0.5  0.3 - 1.0 mg/dL Final    Alkaline Phosphatase 04/24/2018 86  40 - 104 IU/L Final    Total Protein 04/24/2018 7.6  6.0 - 8.2 g/dL Final    ALT (SGPT) 04/24/2018 17  3 - 33 IU/L Final    AST 04/24/2018 19  10 - 40 IU/L Final    Ferritin 04/24/2018 63  24 - 162 ng/mL Final    CRP 04/24/2018 0.19  0.00 - 1.40 mg/dL Final         Plan:           Mid back pain  -     meloxicam (MOBIC) 15 MG tablet; Take 1 tablet (15 mg total) by mouth once daily.  Dispense: 90 tablet; Refill: 1    Chronic right-sided low back pain without sciatica    Primary osteoarthritis of left knee  -     Ambulatory referral/consult to Orthopedics; Future; Expected date: 10/09/2019    Needs flu shot  -     Influenza - High Dose (65+) (PF) (IM)    Other orders  -     Cancel: Ambulatory referral/consult to Orthopedics; Future; Expected date: 10/09/2019      Patient will be given meloxicam for middle low back pain.    Refer to Orthopedics for knee pain. I suspect she has osteoarthritis.      She will be updated on flu shot.    Sleep is okay with combination of requip and trazodone.  I have asked her to start cutting down on gabapentin gradually.    I will see her back in 3-4 months time and see how she is doing.    Advised Ms. Cousin about age and season appropriate immunizations/ cancer screenings.  Also seasonal influenza vaccine, update on tetanus diphtheria vaccination every 10 years.            Current Outpatient Medications:     calcium carbonate-vitamin D3 (CALTRATE 600 + D) 600 mg (1,500 mg)-800  unit Chew, Take 1 tablet by mouth 2 (two) times daily., Disp: 100 tablet, Rfl: 5    cetirizine (ZYRTEC) 10 MG tablet, Take 1 tablet (10 mg total) by mouth once daily., Disp: 90 tablet, Rfl: 0    citalopram (CELEXA) 20 MG tablet, TAKE 1 TABLET EVERY DAY, Disp: 90 tablet, Rfl: 2    gabapentin (NEURONTIN) 300 MG capsule, Take 2 capsules (600 mg total) by mouth every evening., Disp: 180 capsule, Rfl: 1    ipratropium (ATROVENT) 0.03 % nasal spray, 2 sprays by Nasal route 2 (two) times daily., Disp: 30 mL, Rfl: 2    meloxicam (MOBIC) 15 MG tablet, Take 1 tablet (15 mg total) by mouth once daily., Disp: 90 tablet, Rfl: 1    omeprazole (PRILOSEC) 20 MG capsule, TAKE 1 CAPSULE BY MOUTH IN THE EVENING, Disp: 90 capsule, Rfl: 0    rOPINIRole (REQUIP) 4 MG tablet, Take 1 tablet (4 mg total) by mouth 2 (two) times daily., Disp: 20 tablet, Rfl: 1    traZODone (DESYREL) 50 MG tablet, TAKE 1 TABLET EVERY NIGHT, Disp: 90 tablet, Rfl: 1    triamcinolone acetonide 0.5% (KENALOG) 0.5 % Crea, Apply topically 2 (two) times daily. (Patient taking differently: Apply topically 2 (two) times daily as needed. ), Disp: 30 g, Rfl: 1

## 2019-10-09 NOTE — PATIENT INSTRUCTIONS
Arthritis: Exercise     Look for exercise classes for arthritis in your community.     Exercise is important to your overall health. It is especially important in people with arthritis. Regular exercise can:  · Keep your heart and blood vessels healthy  · Help with weight management, or weight loss  · Improve your mood  · Help prevent and manage health problems such as:  ¨ Diabetes  ¨ High blood pressure  ¨ High cholesterol  ¨ Depression  In people with arthritis, it offers all of those benefits and it can:  · Lessen pain and stiffness  · Strengthen muscles that support your joints  · Help you to be able to do the things you enjoy  Exercise and arthritis  Exercise is an important part of any arthritis treatment plan. A complete program consists of the following three types of exercises:  · Aerobic exercises for cardiovascular health and overall fitness.   · Strengthening exercises to build up muscles to help prevent injury and keep joints stable.  · Range-of-motion exercises to keep muscles and joints flexible.  Getting started  Talk with your healthcare provider about what is safe for you. Make sure you:  · Learn how to do exercises properly and safely. Consider talking with a physical therapist or  used to working with people with arthritis.  · Start gradually and build. If you haven't been exercising, start slowly. Don't exercise too hard or too long.  · Create a routine. Set aside specific times for exercise every day.  · Warm up carefully. Take 5 to 10 minutes at the beginning and end of exercising to warm up and cool down. Just do the same exercises at a slower pace for 5 to 10 minutes.  · Work at a comfortable, smooth pace. Move your joints gently to prevent injury.  · Pay attention to your body. Don't exercise a painful or swollen joint; switch to another activity. Follow the 2-hour pain rule: You did too much if your joint or muscle pain lasts 2 hours or more after exercising, or is worse the next  day. This doesn't mean you should stop exercising. Just do less.  Aerobic exercise  Aerobic exercise improves overall health and helps control weight. Choose those that don't add extra stress to your joints. For example, walking, swimming, or bicycling.  Most people should exercise for at least 30 minutes. most days of the week. You don't have to exercise all at once. Try exercising for 10 minutes, 3 times a day, for example.  Strengthening exercises  Strengthening your muscles help to protect your joints and prevent injuries. Try to do strengthening exercises 2 to 3 times a week:  · These exercises can be done with exercise or resistance bands (inexpensive exercise aids that add resistance), or with light weights. Some people use soup cans as weights.  · Isometric exercises are done by tightening the muscles without moving the joint. This may be a good way to strengthen the muscles around a stiff joint.  A physical therapist or  can teach you how to do these exercises.  Range-of-motion (ROM) exercises  Range-of-motion (ROM) exercises allow you to move each of your joints in every way they are intended to move. You should do ROM exercises for each joint 2 to 3 times a day. This will help you maintain full use of all of your joints.  Sample ROM exercises  The following are just a sample of ROM exercises--one for your neck, shoulders, elbows, hips, knees, and ankles. To completely move each joint through its full range of motion, you will have to do a few exercises for each joint. A physical therapist or  can teach you how to do full ROM exercises for each joint.   Repeat each for these exercises 5 to 10 times. Make sure you move slowly:  1. Neck turns. Sit in a straight-backed chair. Look straight ahead. Slowly turn your head to the right, then return it to center. Repeat. Do the same thing, turning your head to the left. Repeat.  2. Shoulder raise. Lie on your back or sit in a chair. Raise one arm over  your head, keeping your elbow straight. Keep the arm close to your ear. Return it slowly to your side. Repeat with your other arm.  3. Elbow stretch. Sit in a chair. If you are able, put both arms out to your sides to form a T. Slowly touch your shoulders with the tips of your fingers. Then return to the T-position. Repeat.  4. Hip stretch. Lie on your back with your legs straight and about 6 inches apart. With your foot flexed, slide your leg out to the side, then slide it back to the starting position. Repeat with your other leg.  5. Knee bend. Sit in a chair with your legs bent at the knees in front of you. Straighten one leg as much as you can, then bring it back to the floor. Repeat this 5 to 10 times. Then do the same thing with the other leg.   6. Ankle stretch. Sit with your feet flat on the floor. Lift your toes off of the floor while your heels stay down. Repeat. Then lift your heels off the floor while your toes stay down. Repeat.  Other exercise  Many other exercise and activities benefit people with arthritis. It is most important to find exercise and activities that you enjoy. You might try:  · Yoga, including chair yoga, helps to keep your joints strong and flexible.   · Kasi Chi, an ancient type of exercise with slow, gentle movements  · Water exercise, including water walking  For more information on exercise for arthritis go to the [employer] website: www.arthritis.org.  Date Last Reviewed: 2/14/2016  © 2589-1632 [employer] [address] [place] [address], [place] [address]. All rights reserved. This information is not intended as a substitute for professional medical care. Always follow your healthcare professional's instructions.        General Neck and Back Pain    Both neck and back pain are usually caused by injury to the muscles or ligaments of the spine. Sometimes the disks that separate each bone of the spine may cause pain by pressing on a nearby nerve. Back and neck pain  may appear after a sudden twisting or bending force (such as in a car accident), or sometimes after a simple awkward movement. In either case, muscle spasm is often present and adds to the pain.  Acute neck and back pain usually gets better in 1 to 2 weeks. Pain related to disk disease, arthritis in the spinal joints or spinal stenosis (narrowing of the spinal canal) can become chronic and last for months or years.  Back and neck pain are common problems. Most people feel better in 1 or 2 weeks, and most of the rest in 1 to 2 months. Most people can remain active.  People experience and describe pain differently.  · Pain can be sharp, stabbing, shooting, aching, cramping, or burning  · Movement, standing, bending, lifting, sitting, or walking may worsen the pain  · Pain can be localized to one spot or area, or it can be more generalized  · Pain can spread or radiate upwards, downwards, to the front, or go down your arms  · Muscle spasm may occur.  Most of the time mechanical problems with the muscles or spine cause the pain. it is usually caused by an injury, whether known or not, to the muscles or ligaments. While illnesses can cause back pain, it is usually not caused by a serious illness. Pain is usually related to physical activity, whether sports, exercise, work, or normal activity. Sometimes it can occur without an identifiable cause. This can happen simply by stretching or moving wrong, without noting pain at the time. Other causes include:  · Overexertion, lifting, pushing, pulling incorrectly or too aggressively.  · Sudden twisting, bending or stretching from an accident (car or fall), or accidental movement.  · Poor posture  · Poor conditioning, lack of regular exercise  · Spinal disc disease or arthritis  · Stress  · Pregnancy, or illness like appendicitis, bladder or kidney infection, pelvic infections   Home care  · For neck pain: Use a comfortable pillow that supports the head and keeps the spine in a  neutral position. The position of the head should not be tilted forward or backward.  · When in bed, try to find a position of comfort. A firm mattress is best. Try lying flat on your back with pillows under your knees. You can also try lying on your side with your knees bent up towards your chest and a pillow between your knees.  · At first, do not try to stretch out the sore spots. If there is a strain, it is not like the good soreness you get after exercising without an injury. In this case, stretching may make it worse.  · Avoid prolonged sitting, long car rides or travel. This puts more stress on the lower back than standing or walking.  · During the first 24 to 72 hours after an injury, apply an ice pack to the painful area for 20 minutes and then remove it for 20 minutes over a period of 60 to 90 minutes or several times a day.   · You can alternate ice and heat therapies. Talk with your healthcare provider about the best treatment for your back or neck pain. As a safety precaution, do not use a heating pad at bedtime. Sleeping with a heating pad can lead to skin burns or tissue damage.  · Therapeutic massage can help relax the back and neck muscles without stretching them.  · Be aware of safe lifting methods and do not lift anything over 15 pounds until all the pain is gone.  Medications  Talk to your healthcare provider before using medicine, especially if you have other medical problems or are taking other medicines.  · You may use over-the-counter medicine to control pain, unless another pain medicine was prescribed. If you have chronic conditions like diabetes, liver or kidney disease, stomach ulcers,  gastrointestinal bleeding, or are taking blood thinner medicines.  · Be careful if you are given pain medicines, narcotics, or medicine for muscle spasm. They can cause drowsiness, and can affect your coordination, reflexes, and judgment. Do not drive or operate heavy machinery.  Follow-up care  Follow up  with your healthcare provider, or as advised. Physical therapy or further tests may be needed.  If X-rays were taken, you will be notified of any new findings that may affect your care.  Call 911  Seek emergency medical care if any of the following occur:  · Trouble breathing  · Confusion  · Very drowsy or trouble awakening  · Fainting or loss of consciousness  · Rapid or very slow heart rate  · Loss of bowel or bladder control  When to seek medical advice  Call your healthcare provider right away if any of these occur:  · Pain becomes worse or spreads into your arms or legs  · Weakness, numbness or pain in one or both arms or legs  · Numbness in the groin area  · Difficulty walking  · Fever of 100.4ºF (38ºC) or higher, or as directed by your healthcare provider  Date Last Reviewed: 7/1/2016  © 4998-9012 [employer]. [address] [place] [id number]. All rights reserved. This information is not intended as a substitute for professional medical care. Always follow your healthcare professional's instructions.

## 2019-10-17 ENCOUNTER — TELEPHONE (OUTPATIENT)
Dept: ORTHOPEDICS | Facility: CLINIC | Age: 69
End: 2019-10-17

## 2019-10-17 NOTE — TELEPHONE ENCOUNTER
LEFT MESSAGE FOR PATIENT TO CALL AND GET ON OUR SCHEDULE FOR LEFT KNEE ARTHRITIS PER REFERRAL FROM DR. WALDEN.

## 2019-10-22 DIAGNOSIS — M25.562 LEFT KNEE PAIN, UNSPECIFIED CHRONICITY: Primary | ICD-10-CM

## 2019-12-31 DIAGNOSIS — K21.9 GASTROESOPHAGEAL REFLUX DISEASE WITHOUT ESOPHAGITIS: ICD-10-CM

## 2020-01-02 DIAGNOSIS — G25.81 RLS (RESTLESS LEGS SYNDROME): ICD-10-CM

## 2020-01-02 RX ORDER — GABAPENTIN 300 MG/1
600 CAPSULE ORAL NIGHTLY
Qty: 180 CAPSULE | Refills: 1 | Status: SHIPPED | OUTPATIENT
Start: 2020-01-02 | End: 2020-11-01

## 2020-01-02 RX ORDER — OMEPRAZOLE 20 MG/1
CAPSULE, DELAYED RELEASE ORAL
Qty: 90 CAPSULE | Refills: 0 | Status: SHIPPED | OUTPATIENT
Start: 2020-01-02 | End: 2020-01-06

## 2020-01-02 RX ORDER — ROPINIROLE 4 MG/1
4 TABLET, FILM COATED ORAL 2 TIMES DAILY
Qty: 180 TABLET | Refills: 2 | Status: SHIPPED | OUTPATIENT
Start: 2020-01-02 | End: 2020-06-19

## 2020-01-02 RX ORDER — TRAZODONE HYDROCHLORIDE 50 MG/1
TABLET ORAL
Qty: 90 TABLET | Refills: 1 | Status: SHIPPED | OUTPATIENT
Start: 2020-01-02 | End: 2020-01-06

## 2020-01-04 DIAGNOSIS — M54.9 MID BACK PAIN: Chronic | ICD-10-CM

## 2020-01-04 DIAGNOSIS — K21.9 GASTROESOPHAGEAL REFLUX DISEASE WITHOUT ESOPHAGITIS: ICD-10-CM

## 2020-01-06 RX ORDER — OMEPRAZOLE 20 MG/1
CAPSULE, DELAYED RELEASE ORAL
Qty: 90 CAPSULE | Refills: 0 | Status: SHIPPED | OUTPATIENT
Start: 2020-01-06 | End: 2020-06-17

## 2020-01-06 RX ORDER — MELOXICAM 15 MG/1
TABLET ORAL
Qty: 90 TABLET | Refills: 1 | Status: SHIPPED | OUTPATIENT
Start: 2020-01-06 | End: 2020-06-19

## 2020-01-06 RX ORDER — TRAZODONE HYDROCHLORIDE 50 MG/1
TABLET ORAL
Qty: 90 TABLET | Refills: 1 | Status: SHIPPED | OUTPATIENT
Start: 2020-01-06 | End: 2020-10-06

## 2020-06-11 ENCOUNTER — OFFICE VISIT (OUTPATIENT)
Dept: FAMILY MEDICINE | Facility: CLINIC | Age: 70
End: 2020-06-11
Payer: MEDICARE

## 2020-06-11 ENCOUNTER — HOSPITAL ENCOUNTER (OUTPATIENT)
Dept: RADIOLOGY | Facility: HOSPITAL | Age: 70
Discharge: HOME | End: 2020-06-11
Attending: NURSE PRACTITIONER
Payer: MEDICARE

## 2020-06-11 VITALS
WEIGHT: 147 LBS | BODY MASS INDEX: 28.86 KG/M2 | OXYGEN SATURATION: 97 % | DIASTOLIC BLOOD PRESSURE: 72 MMHG | HEART RATE: 89 BPM | TEMPERATURE: 98 F | SYSTOLIC BLOOD PRESSURE: 130 MMHG | HEIGHT: 60 IN

## 2020-06-11 DIAGNOSIS — M25.551 ACUTE RIGHT HIP PAIN: Primary | ICD-10-CM

## 2020-06-11 DIAGNOSIS — R21 RASH AND NONSPECIFIC SKIN ERUPTION: ICD-10-CM

## 2020-06-11 DIAGNOSIS — M25.551 ACUTE RIGHT HIP PAIN: ICD-10-CM

## 2020-06-11 PROCEDURE — 99213 PR OFFICE/OUTPT VISIT, EST, LEVL III, 20-29 MIN: ICD-10-PCS | Mod: S$GLB,,, | Performed by: NURSE PRACTITIONER

## 2020-06-11 PROCEDURE — 1159F MED LIST DOCD IN RCRD: CPT | Mod: S$GLB,,, | Performed by: NURSE PRACTITIONER

## 2020-06-11 PROCEDURE — 1125F AMNT PAIN NOTED PAIN PRSNT: CPT | Mod: S$GLB,,, | Performed by: NURSE PRACTITIONER

## 2020-06-11 PROCEDURE — 73502 X-RAY EXAM HIP UNI 2-3 VIEWS: CPT | Mod: TC,PO,RT

## 2020-06-11 PROCEDURE — 99213 OFFICE O/P EST LOW 20 MIN: CPT | Mod: S$GLB,,, | Performed by: NURSE PRACTITIONER

## 2020-06-11 PROCEDURE — 1170F FXNL STATUS ASSESSED: CPT | Mod: S$GLB,,, | Performed by: NURSE PRACTITIONER

## 2020-06-11 PROCEDURE — 1170F PR FUNCTIONAL STATUS ASSESSED: ICD-10-PCS | Mod: S$GLB,,, | Performed by: NURSE PRACTITIONER

## 2020-06-11 PROCEDURE — 1101F PR PT FALLS ASSESS DOC 0-1 FALLS W/OUT INJ PAST YR: ICD-10-PCS | Mod: S$GLB,,, | Performed by: NURSE PRACTITIONER

## 2020-06-11 PROCEDURE — 1101F PT FALLS ASSESS-DOCD LE1/YR: CPT | Mod: S$GLB,,, | Performed by: NURSE PRACTITIONER

## 2020-06-11 PROCEDURE — 1159F PR MEDICATION LIST DOCUMENTED IN MEDICAL RECORD: ICD-10-PCS | Mod: S$GLB,,, | Performed by: NURSE PRACTITIONER

## 2020-06-11 PROCEDURE — 1125F PR PAIN SEVERITY QUANTIFIED, PAIN PRESENT: ICD-10-PCS | Mod: S$GLB,,, | Performed by: NURSE PRACTITIONER

## 2020-06-11 RX ORDER — TRAMADOL HYDROCHLORIDE 50 MG/1
50 TABLET ORAL EVERY 12 HOURS PRN
Qty: 14 TABLET | Refills: 0 | Status: SHIPPED | OUTPATIENT
Start: 2020-06-11 | End: 2020-06-15 | Stop reason: CLARIF

## 2020-06-11 RX ORDER — TRIAMCINOLONE ACETONIDE 5 MG/G
CREAM TOPICAL 2 TIMES DAILY
Qty: 30 G | Refills: 0 | Status: SHIPPED | OUTPATIENT
Start: 2020-06-11 | End: 2022-08-24

## 2020-06-11 NOTE — PROGRESS NOTES
SUBJECTIVE:      Patient ID: Vesta Eaton is a 69 y.o. female.    Chief Complaint: Hip Pain (Right hip pain x 3 weeks )    Mrs. Eaton, an established patient of Dr. Colon, presents to the clinic with complaints of right hip pain.  She has a history of bilateral hip replacements, her right hip was done approximately 14 years ago.  She had a DEXA scan in 2018 that showed osteopenia of the spine and forearm and she also has a history osteoarthritis. She reports her hip pain has been ongoing for about 3 weeks now.  Her pain is more anterior.  Her pain is worse at work where she is on her feet all day as a . She takes Mobic for her arthritic pain.  The pain is unrelieved by Mobic.  Her  had hydrocodone and she took 2 of those without relief.  She also reports a rash to her right hip, at the site of her scar, that has been present for a few months.  She reports the rash is starting on her left hip.  She denies changing soaps, detergents, or coming into contact with any new allergens. She denies fevers or injury/trauma to her hip.    Hip Pain    The incident occurred more than 1 week ago. There was no injury mechanism. The pain is present in the right hip. The pain is at a severity of 8/10. The pain is moderate. The pain has been constant since onset. Pertinent negatives include no inability to bear weight, loss of sensation, numbness or tingling. She reports no foreign bodies present. The symptoms are aggravated by weight bearing. She has tried NSAIDs, ice and rest for the symptoms. The treatment provided mild relief.       Family History   Problem Relation Age of Onset    Arthritis Father     Heart disease Father       Social History     Socioeconomic History    Marital status:      Spouse name: Not on file    Number of children: 2    Years of education: Not on file    Highest education level: Not on file   Occupational History    Not on file   Social Needs    Financial resource strain:  Not on file    Food insecurity:     Worry: Not on file     Inability: Not on file    Transportation needs:     Medical: Not on file     Non-medical: Not on file   Tobacco Use    Smoking status: Never Smoker    Smokeless tobacco: Never Used   Substance and Sexual Activity    Alcohol use: Yes     Comment: kaya staley    Drug use: No    Sexual activity: Yes     Partners: Male   Lifestyle    Physical activity:     Days per week: Not on file     Minutes per session: Not on file    Stress: Very much   Relationships    Social connections:     Talks on phone: Not on file     Gets together: Not on file     Attends Taoism service: Not on file     Active member of club or organization: Not on file     Attends meetings of clubs or organizations: Not on file     Relationship status: Not on file   Other Topics Concern    Not on file   Social History Narrative        2 sons    Works part time at [employer]     Current Outpatient Medications   Medication Sig Dispense Refill    calcium carbonate-vitamin D3 (CALTRATE 600 + D) 600 mg (1,500 mg)-800 unit Chew Take 1 tablet by mouth 2 (two) times daily. 100 tablet 5    cetirizine (ZYRTEC) 10 MG tablet Take 1 tablet (10 mg total) by mouth once daily. 90 tablet 0    citalopram (CELEXA) 20 MG tablet TAKE 1 TABLET EVERY DAY 90 tablet 2    gabapentin (NEURONTIN) 300 MG capsule Take 2 capsules (600 mg total) by mouth every evening. 180 capsule 1    ipratropium (ATROVENT) 0.03 % nasal spray 2 sprays by Nasal route 2 (two) times daily. 30 mL 2    meloxicam (MOBIC) 15 MG tablet TAKE 1 TABLET EVERY DAY 90 tablet 1    omeprazole (PRILOSEC) 20 MG capsule TAKE 1 CAPSULE IN THE EVENING 90 capsule 0    rOPINIRole (REQUIP) 4 MG tablet Take 1 tablet (4 mg total) by mouth 2 (two) times daily. 180 tablet 2    traZODone (DESYREL) 50 MG tablet TAKE 1 TABLET (50 MG TOTAL) BY MOUTH NIGHTLY. 90 tablet 1    traMADoL (ULTRAM) 50 mg tablet Take 1 tablet (50 mg total)  by mouth every 12 (twelve) hours as needed for Pain. 14 tablet 0    triamcinolone acetonide 0.5% (KENALOG) 0.5 % Crea Apply topically 2 (two) times daily. for 10 days 30 g 0     No current facility-administered medications for this visit.      Review of patient's allergies indicates:  No Known Allergies   Past Medical History:   Diagnosis Date    Abdominal pain, acute     Abdominal wall mass of epigastric region     Acid reflux     Acute low back pain     Annual physical exam     Anxiety     Bilateral shoulder pain     Body mass index (bmi) 31.0-31.9, adult     Cervical spondylosis     Chronic right shoulder pain     Decreased GFR     Depression     Hearing aid worn     bilateral    History of ulcerative colitis     History of vitamin D deficiency 4/24/2018    HNP (herniated nucleus pulposus), lumbar     Hyperlipidemia     Insomnia     Kidney stone     Left-sided low back pain without sciatica     Non-smoker     Osteoarthritis     Osteoarthritis of left glenohumeral joint     Osteopenia     Osteoporosis screening     Palpable abdominal mass     Peripheral neuropathy     Plantar fasciitis of left foot     RLS (restless legs syndrome)     Screening for colorectal cancer     Tear of right rotator cuff     Visit for screening mammogram      Past Surgical History:   Procedure Laterality Date    CHOLECYSTECTOMY      COLON SURGERY      HEEL SPUR SURGERY      JOINT REPLACEMENT Bilateral     hip    partial colectomy      for ulcerative colitis    SHOULDER SURGERY  05/2016    TONSILLECTOMY      total right hip replacement         Review of Systems   Constitutional: Negative for activity change, chills, fatigue, fever and unexpected weight change.   HENT: Negative for congestion, ear pain, sinus pressure, sore throat, trouble swallowing and voice change.    Eyes: Negative for pain, discharge and visual disturbance.   Respiratory: Negative for cough, chest tightness, shortness of breath  and wheezing.    Cardiovascular: Negative for chest pain and palpitations.   Gastrointestinal: Negative for abdominal pain, constipation, diarrhea, nausea and vomiting.   Genitourinary: Negative for difficulty urinating, flank pain, frequency and urgency.   Musculoskeletal: Positive for arthralgias. Negative for back pain and joint swelling.        Right hip pain   Skin: Positive for rash. Negative for color change.   Neurological: Negative for dizziness, tingling, seizures, syncope, weakness, numbness and headaches.   Hematological: Negative for adenopathy.   Psychiatric/Behavioral: Negative for dysphoric mood and sleep disturbance. The patient is not nervous/anxious.       OBJECTIVE:      Vitals:    06/11/20 1510   BP: 130/72   BP Location: Right arm   Patient Position: Sitting   BP Method: Large (Manual)   Pulse: 89   Temp: 98.2 °F (36.8 °C)   SpO2: 97%   Weight: 66.7 kg (147 lb)   Height: 5' (1.524 m)     Physical Exam   Constitutional: She is oriented to person, place, and time. Vital signs are normal. She appears well-developed and well-nourished.  Non-toxic appearance. No distress.   HENT:   Head: Normocephalic and atraumatic.   Right Ear: Tympanic membrane and external ear normal.   Left Ear: Tympanic membrane and external ear normal.   Nose: Nose normal.   Mouth/Throat: Uvula is midline, oropharynx is clear and moist and mucous membranes are normal. No oropharyngeal exudate.   Eyes: Pupils are equal, round, and reactive to light. Conjunctivae, EOM and lids are normal. Right eye exhibits no discharge. Left eye exhibits no discharge. No scleral icterus.   Neck: Trachea normal and normal range of motion. Neck supple. Carotid bruit is not present. No thyromegaly present.   Cardiovascular: Normal rate, regular rhythm, normal heart sounds and intact distal pulses. Exam reveals no friction rub.   No murmur heard.  Pulmonary/Chest: Effort normal and breath sounds normal. No stridor. No respiratory distress. She has  no wheezes. She has no rhonchi. She has no rales.   Abdominal: Soft. Bowel sounds are normal. She exhibits no distension. There is no tenderness.   Musculoskeletal: Normal range of motion. She exhibits tenderness. She exhibits no edema.        Right hip: She exhibits tenderness and bony tenderness. She exhibits no crepitus and no deformity.   Patient has decent range of motion but experiences pain.   Lymphadenopathy:     She has no cervical adenopathy.   Neurological: She is alert and oriented to person, place, and time. GCS eye subscore is 4. GCS verbal subscore is 5. GCS motor subscore is 6.   Skin: Skin is warm, dry and intact. Capillary refill takes less than 2 seconds. Purpura and rash noted. Rash is macular. She is not diaphoretic. No erythema.   She has a pruritic rash.  Scabs noted to rash from scratching.  No active draining.    Psychiatric: She has a normal mood and affect. Her behavior is normal. Judgment and thought content normal. She expresses no suicidal plans.   Nursing note and vitals reviewed.     Assessment:       1. Acute right hip pain    2. Rash and nonspecific skin eruption        Plan:       Acute right hip pain   Will get x-rays to see if the hardware is intact or other causes that could be associated with her pain.  Her pain could be associated with worsening osteoarthritis.  Due to the severity of her pain, will try a short course of tramadol.  Side effects of new medication discussed with patient; understanding voiced.  Patient will likely need to follow-up with her orthopedist.  -     X-Ray Hip 2 or 3 views Right; Future; Expected date: 06/11/2020  -     traMADoL (ULTRAM) 50 mg tablet; Take 1 tablet (50 mg total) by mouth every 12 (twelve) hours as needed for Pain.  Dispense: 14 tablet; Refill: 0    Rash and nonspecific skin eruption   Will start patient on Kenalog cream for the rash.  She has been prescribed this medication in the past.  Differential diagnosis includes allergic  dermatitis, contact dermatitis, folliculitis.  -     triamcinolone acetonide 0.5% (KENALOG) 0.5 % Crea; Apply topically 2 (two) times daily. for 10 days  Dispense: 30 g; Refill: 0        Follow up if symptoms worsen or fail to improve.      6/11/2020 MIKAL Magaña, FNP

## 2020-06-11 NOTE — PATIENT INSTRUCTIONS
Osteoarthritis: Common Sites  Osteoarthritis (OA) is sometimes called degenerative joint disease or wear-and-tear arthritis. It's the most common type of arthritis. In OA, the cartilage wears away. Cartilage covers the ends of bones and acts as a cushion. If enough cartilage wears away, bone rubs against bone. The joint changes in OA cause pain, stiffness, and trouble moving. OA may occur in any joint. Some joints that are commonly affected are the spine, neck,  hips, knees, fingers, and toes.     Neck  Joints between small bones in the neck may wear out. Pain may travel to the shoulder or the base of the skull.  Lumbar Spine  Bony spurs may form on the joints between the vertebrae (spinal bones). And disks (cushions of cartilage between vertebrae) may wear down. Pain may affect the lower back or leg.  Hip  Cartilage damage can occur in the large ball and socket joint that connects the pelvis and thighbone (femur). Pain may travel to the groin, buttocks, or knee.  Knee  The cartilage in the knee joint may wear down. Weakness or instability in the knee joint may make walking or climbing stairs difficult.  Fingers  Finger joints may become enlarged and knobby. Grasping objects may be hard, especially if the joint at the base of the thumb is affected.  Toes  Toes may be affected. Arthritis may cause a bunion, a bump at the base of the big toe. Standing or walking may be painful.  Date Last Reviewed: 2/14/2016  © 1809-1501 [email or web address]. [address] [place] [address], [place] [address]. All rights reserved. This information is not intended as a substitute for professional medical care. Always follow your healthcare professional's instructions.

## 2020-06-12 ENCOUNTER — PATIENT MESSAGE (OUTPATIENT)
Dept: FAMILY MEDICINE | Facility: CLINIC | Age: 70
End: 2020-06-12

## 2020-06-12 NOTE — TELEPHONE ENCOUNTER
I spoke with the patient about her x-ray results.  There was no acute fracture or dislocation.  Bilateral total hip arthroplasties are in expected configuration with no findings of hardware loosening/failure.  Advised her to rest extremity when she can.  Take medications as prescribed and follow-up if symptoms fail to improve.

## 2020-06-15 RX ORDER — TRAMADOL HYDROCHLORIDE 50 MG/1
50 TABLET ORAL EVERY 12 HOURS PRN
Qty: 14 TABLET | Refills: 0 | Status: SHIPPED | OUTPATIENT
Start: 2020-06-15 | End: 2020-07-06 | Stop reason: SDUPTHER

## 2020-06-19 ENCOUNTER — PATIENT MESSAGE (OUTPATIENT)
Dept: FAMILY MEDICINE | Facility: CLINIC | Age: 70
End: 2020-06-19

## 2020-06-22 DIAGNOSIS — M25.551 ACUTE RIGHT HIP PAIN: ICD-10-CM

## 2020-06-22 RX ORDER — TRAMADOL HYDROCHLORIDE 50 MG/1
50 TABLET ORAL EVERY 12 HOURS PRN
Qty: 14 TABLET | Refills: 0 | Status: CANCELLED | OUTPATIENT
Start: 2020-06-22

## 2020-07-06 DIAGNOSIS — M25.551 ACUTE RIGHT HIP PAIN: ICD-10-CM

## 2020-07-09 RX ORDER — TRAMADOL HYDROCHLORIDE 50 MG/1
50 TABLET ORAL EVERY 12 HOURS PRN
Qty: 14 TABLET | Refills: 0 | Status: SHIPPED | OUTPATIENT
Start: 2020-07-09 | End: 2020-09-23 | Stop reason: SDUPTHER

## 2020-09-23 ENCOUNTER — OFFICE VISIT (OUTPATIENT)
Dept: FAMILY MEDICINE | Facility: CLINIC | Age: 70
End: 2020-09-23
Payer: COMMERCIAL

## 2020-09-23 VITALS
HEIGHT: 60 IN | TEMPERATURE: 97 F | DIASTOLIC BLOOD PRESSURE: 65 MMHG | WEIGHT: 150 LBS | BODY MASS INDEX: 29.45 KG/M2 | HEART RATE: 79 BPM | SYSTOLIC BLOOD PRESSURE: 107 MMHG

## 2020-09-23 DIAGNOSIS — Z23 NEEDS FLU SHOT: ICD-10-CM

## 2020-09-23 DIAGNOSIS — M54.9 MID BACK PAIN: Primary | Chronic | ICD-10-CM

## 2020-09-23 DIAGNOSIS — Z12.31 ENCOUNTER FOR SCREENING MAMMOGRAM FOR BREAST CANCER: ICD-10-CM

## 2020-09-23 PROCEDURE — 99213 PR OFFICE/OUTPT VISIT, EST, LEVL III, 20-29 MIN: ICD-10-PCS | Mod: 25,S$GLB,, | Performed by: INTERNAL MEDICINE

## 2020-09-23 PROCEDURE — 90662 FLU VACCINE - QUADRIVALENT - HIGH DOSE (65+) PRESERVATIVE FREE IM: ICD-10-PCS | Mod: S$GLB,,, | Performed by: INTERNAL MEDICINE

## 2020-09-23 PROCEDURE — G0008 FLU VACCINE - QUADRIVALENT - HIGH DOSE (65+) PRESERVATIVE FREE IM: ICD-10-PCS | Mod: S$GLB,,, | Performed by: INTERNAL MEDICINE

## 2020-09-23 PROCEDURE — 99213 OFFICE O/P EST LOW 20 MIN: CPT | Mod: 25,S$GLB,, | Performed by: INTERNAL MEDICINE

## 2020-09-23 PROCEDURE — 90662 IIV NO PRSV INCREASED AG IM: CPT | Mod: S$GLB,,, | Performed by: INTERNAL MEDICINE

## 2020-09-23 PROCEDURE — G0008 ADMIN INFLUENZA VIRUS VAC: HCPCS | Mod: S$GLB,,, | Performed by: INTERNAL MEDICINE

## 2020-09-23 RX ORDER — TRAMADOL HYDROCHLORIDE 50 MG/1
50 TABLET ORAL EVERY 12 HOURS PRN
Qty: 14 TABLET | Refills: 0 | Status: SHIPPED | OUTPATIENT
Start: 2020-09-23 | End: 2020-11-07

## 2020-09-23 NOTE — PATIENT INSTRUCTIONS
Back Exercises: Hip Rotator Stretch    To start, lie on your back with your knees bent and feet flat on the floor. Dont press your neck or lower back to the floor. Breathe deeply. You should feel comfortable and relaxed in this position.  · Rest your right ankle on your left knee.  · Place a towel behind your left thigh, and use it to pull the knee toward your chest. Feel the stretch in your buttocks.  · Hold for 30 to 60 seconds. Release.  · Repeat 2 times.  · Switch legs.   · If there is any pain other than stretch in the knee or buttock, stop and contact your healthcare provider.  For your safety, check with your healthcare provider before starting an exercise program.   Date Last Reviewed: 8/16/2015  © 2805-1161 [employer]. [address] [place] [address]. All rights reserved. This information is not intended as a substitute for professional medical care. Always follow your healthcare professional's instructions.        Back Exercises: Knee Lift         To start, lie on your back with your knees bent and feet flat on the floor. Dont press your neck or lower back to the floor. Breathe deeply. You should feel comfortable and relaxed in this position:  · Start by tightening your abdominal muscles.  · Lift one bent knee off the floor 2 to 4 inches.  · Hold for 10 seconds. Return to start position.  · Repeat 3 times.  · Switch legs.  Date Last Reviewed: 8/16/2015  © 3518-0344 [employer]. [address] [place] [address]. All rights reserved. This information is not intended as a substitute for professional medical care. Always follow your healthcare professional's instructions.        Back Exercises: Leg Pull    To start, lie on your back with your knees bent and feet flat on the floor. Dont press your neck or lower back to the floor. Breathe deeply. You should feel comfortable and relaxed in this position.  · Pull one knee to your chest.  · Hold for 30 to  60 seconds. Return to starting position.  · Repeat 2 times.  · Switch legs.  · For a double leg pull, pull both legs to your chest at the same time. Repeat 2 times.  For your safety, check with your healthcare provider before starting an exercise program.   Date Last Reviewed: 8/16/2015  © 6431-7569 [employer]. [address] [place] [address], [place] [address]. All rights reserved. This information is not intended as a substitute for professional medical care. Always follow your healthcare professional's instructions.

## 2020-09-23 NOTE — PROGRESS NOTES
Subjective:       Patient ID: Vesta Eaton is a 70 y.o. female.    Chief Complaint: Back Pain (refills)    Miss eaton is a 70-year-old female who comes for follow-up.  Underlying medical issues of chronic low back pain which is intermittent in nature has been noted.  She takes tramadol rarely as needed.    Underlying insomnia also has been noted and she takes trazodone 50 mg which seems to help her.    She also takes citalopram for stress and anxiety.  She takes Requip for 4 mg for restless leg.    She will be due for mammogram.    Back Pain  This is a chronic problem. The problem occurs constantly. The problem has been waxing and waning since onset. The pain is present in the lumbar spine. The pain is moderate. Pertinent negatives include no chest pain, dysuria, fever or headaches.       Past Medical History:   Diagnosis Date    Abdominal pain, acute     Abdominal wall mass of epigastric region     Acid reflux     Acute low back pain     Annual physical exam     Anxiety     Bilateral shoulder pain     Body mass index (bmi) 31.0-31.9, adult     Cervical spondylosis     Chronic right shoulder pain     Decreased GFR     Depression     Hearing aid worn     bilateral    History of ulcerative colitis     History of vitamin D deficiency 4/24/2018    HNP (herniated nucleus pulposus), lumbar     Hyperlipidemia     Insomnia     Kidney stone     Left-sided low back pain without sciatica     Non-smoker     Osteoarthritis     Osteoarthritis of left glenohumeral joint     Osteopenia     Osteoporosis screening     Palpable abdominal mass     Peripheral neuropathy     Plantar fasciitis of left foot     RLS (restless legs syndrome)     Screening for colorectal cancer     Tear of right rotator cuff     Visit for screening mammogram      Social History     Socioeconomic History    Marital status:      Spouse name: Not on file    Number of children: 2    Years of education: Not on file     Highest education level: Not on file   Occupational History    Not on file   Social Needs    Financial resource strain: Not on file    Food insecurity     Worry: Not on file     Inability: Not on file    Transportation needs     Medical: Not on file     Non-medical: Not on file   Tobacco Use    Smoking status: Never Smoker    Smokeless tobacco: Never Used   Substance and Sexual Activity    Alcohol use: Yes     Comment: kaya staley    Drug use: No    Sexual activity: Yes     Partners: Male   Lifestyle    Physical activity     Days per week: Not on file     Minutes per session: Not on file    Stress: Very much   Relationships    Social connections     Talks on phone: Not on file     Gets together: Not on file     Attends Advent service: Not on file     Active member of club or organization: Not on file     Attends meetings of clubs or organizations: Not on file     Relationship status: Not on file   Other Topics Concern    Not on file   Social History Narrative        2 sons    Works part time at [employer]     Past Surgical History:   Procedure Laterality Date    CHOLECYSTECTOMY      COLON SURGERY      HEEL SPUR SURGERY      JOINT REPLACEMENT Bilateral     hip    partial colectomy      for ulcerative colitis    SHOULDER SURGERY  05/2016    TONSILLECTOMY      total right hip replacement       Family History   Problem Relation Age of Onset    Arthritis Father     Heart disease Father        Review of Systems   Constitutional: Negative for chills, fatigue and fever.        Difficult historian   HENT: Negative for congestion, postnasal drip and sinus pressure.    Eyes: Negative for pain, discharge and visual disturbance.   Respiratory: Negative for cough, chest tightness and shortness of breath.    Cardiovascular: Negative for chest pain, palpitations and leg swelling.   Gastrointestinal: Negative for abdominal distention, constipation and diarrhea.   Genitourinary:  Negative for difficulty urinating, dysuria, flank pain and frequency.   Musculoskeletal: Positive for arthralgias, back pain, gait problem, myalgias and neck pain. Negative for joint swelling.   Skin: Negative for color change and pallor.   Neurological: Negative for dizziness, facial asymmetry, light-headedness and headaches.        RLS-takes generic requip- Requip does not help. In past She borrowed couple of gabapentin 300 mg each from her  which seems to help her.   Psychiatric/Behavioral: Positive for sleep disturbance. Negative for confusion. The patient is not nervous/anxious.          Objective:      Blood pressure 107/65, pulse 79, temperature 97.4 °F (36.3 °C), height 5' (1.524 m), weight 68 kg (150 lb). Body mass index is 29.29 kg/m².  Physical Exam  Vitals signs and nursing note reviewed.   Constitutional:       General: She is not in acute distress.     Appearance: She is well-developed.   HENT:      Head: Normocephalic and atraumatic.   Eyes:      General: Lids are normal. Lids are everted, no foreign bodies appreciated.      Conjunctiva/sclera: Conjunctivae normal.   Neck:      Musculoskeletal: Neck supple.      Thyroid: No thyromegaly.      Trachea: Trachea normal.   Cardiovascular:      Rate and Rhythm: Normal rate and regular rhythm.      Heart sounds: Normal heart sounds, S1 normal and S2 normal.   Pulmonary:      Effort: No respiratory distress.      Breath sounds: Normal breath sounds.   Abdominal:      Palpations: Abdomen is soft. Abdomen is not rigid.   Musculoskeletal:         General: No deformity.      Lumbar back: She exhibits normal range of motion, no tenderness and no swelling.        Back:    Lymphadenopathy:      Cervical: No cervical adenopathy.   Skin:     General: Skin is warm and dry.   Neurological:      Mental Status: She is alert.   Psychiatric:         Attention and Perception: She is inattentive.         Behavior: Behavior is cooperative.         Cognition and Memory:  Memory is impaired.      Comments: Rather flat affect. Soft-spoken. Some cognitive issues           Assessment:       1. Mid back pain    2. Encounter for screening mammogram for breast cancer    3. Needs flu shot           No visits with results within 3 Month(s) from this visit.   Latest known visit with results is:   Office Visit on 04/24/2018   Component Date Value Ref Range Status    Hepatitis C Ab 04/24/2018 <0.1  0.0 - 0.9 s/co ratio Final    WBC 04/24/2018 7.9  5.0 - 10.0 K/uL Final    RBC 04/24/2018 4.81  3.50 - 5.50 M/uL Final    Hemoglobin 04/24/2018 14.0  12.0 - 15.0 g/dL Final    Hematocrit 04/24/2018 43.6  36.0 - 48.0 % Final    Mean Corpuscular Volume 04/24/2018 90.6  79.0 - 98.0 fL Final    Mean Corpuscular Hemoglobin 04/24/2018 29.1  25.0 - 35.0 pg Final    Mean Corpuscular Hemoglobin Conc 04/24/2018 32.1  31.0 - 36.0 g/dL Final    RDW 04/24/2018 13.2  11.7 - 14.9 % Final    Platelets 04/24/2018 275  140 - 440 K/uL Final    MPV 04/24/2018 10.7  8.8 - 12.7 fL Final    Gran% 04/24/2018 58.4  % Final    Lymph% 04/24/2018 22.7  % Final    Mono% 04/24/2018 10.1  % Final    Eosinophil% 04/24/2018 7.4  % Final    Basophil% 04/24/2018 1.1  % Final    Gran # (ANC) 04/24/2018 4.6  1.4 - 6.5 K/uL Final    Lymph # 04/24/2018 1.8  1.2 - 3.4 K/uL Final    Mono # 04/24/2018 0.8* 0.1 - 0.6 K/uL Final    Eos # 04/24/2018 0.6  0.0 - 0.7 K/uL Final    Baso # 04/24/2018 0.1  0.0 - 0.2 K/uL Final    Immature Grans (Abs) 04/24/2018 0.0  0.0 - 1.0 K/uL Final    Immature Granulocytes 04/24/2018 0.3  % Final    nRBC% 04/24/2018 0  % Final    Glucose 04/24/2018 99  70 - 99 mg/dL Final    BUN, Bld 04/24/2018 23* 8 - 20 mg/dL Final    Creatinine 04/24/2018 0.75  0.60 - 1.40 mg/dL Final    Calcium 04/24/2018 9.3  7.7 - 10.4 mg/dL Final    Sodium 04/24/2018 137  134 - 144 mmol/L Final    Potassium 04/24/2018 4.4  3.5 - 5.0 mmol/L Final    Chloride 04/24/2018 104  98 - 110 mmol/L Final    CO2  04/24/2018 25.3  22.8 - 31.6 mmol/L Final    Albumin 04/24/2018 4.4  3.1 - 4.7 g/dL Final    Total Bilirubin 04/24/2018 0.5  0.3 - 1.0 mg/dL Final    Alkaline Phosphatase 04/24/2018 86  40 - 104 IU/L Final    Total Protein 04/24/2018 7.6  6.0 - 8.2 g/dL Final    ALT (SGPT) 04/24/2018 17  3 - 33 IU/L Final    AST 04/24/2018 19  10 - 40 IU/L Final    Ferritin 04/24/2018 63  24 - 162 ng/mL Final    CRP 04/24/2018 0.19  0.00 - 1.40 mg/dL Final         Plan:           Mid back pain  Comments:  New prescription sent for tramadol.  Cannot increase on meloxicam after limit of 15 mg.  Use minimal off these medications.  Consider physical therapy/chiroprac  Orders:  -     traMADoL (ULTRAM) 50 mg tablet; Take 1 tablet (50 mg total) by mouth every 12 (twelve) hours as needed for Pain.  Dispense: 14 tablet; Refill: 0    Encounter for screening mammogram for breast cancer  -     Mammo Digital Screening Bilat; Future; Expected date: 09/30/2020    Needs flu shot  -     Influenza - Quadrivalent - High Dose (65+) (PF) (IM)      New prescription for limited time has been sent for tramadol.  Minimize the use of this medication perhaps once a week or so.    Meloxicam can be given only at 15 mg that above that it may increase the risk for kidney and stomach damage.    Continue with back exercises.    Patient is interested in accupuncture for the back and I will try to find out any specialist or provider who provides this service.    Physical therapy, massage, chiropractic are other modalities which in conjunction fax back hygiene can help her.    Follow-up in 4 months.  Earlier if needed      Current Outpatient Medications:     calcium carbonate-vitamin D3 (CALTRATE 600 + D) 600 mg (1,500 mg)-800 unit Chew, Take 1 tablet by mouth 2 (two) times daily., Disp: 100 tablet, Rfl: 5    cetirizine (ZYRTEC) 10 MG tablet, Take 1 tablet (10 mg total) by mouth once daily., Disp: 90 tablet, Rfl: 0    citalopram (CELEXA) 20 MG tablet, TAKE  1 TABLET EVERY DAY, Disp: 90 tablet, Rfl: 2    gabapentin (NEURONTIN) 300 MG capsule, Take 2 capsules (600 mg total) by mouth every evening., Disp: 180 capsule, Rfl: 1    ipratropium (ATROVENT) 0.03 % nasal spray, 2 sprays by Nasal route 2 (two) times daily., Disp: 30 mL, Rfl: 2    meloxicam (MOBIC) 15 MG tablet, TAKE 1 TABLET EVERY DAY, Disp: 90 tablet, Rfl: 1    omeprazole (PRILOSEC) 20 MG capsule, TAKE 1 CAPSULE IN THE EVENING, Disp: 90 capsule, Rfl: 0    rOPINIRole (REQUIP) 4 MG tablet, TAKE 1 TABLET (4 MG TOTAL) BY MOUTH 2 (TWO) TIMES DAILY., Disp: 180 tablet, Rfl: 2    traMADoL (ULTRAM) 50 mg tablet, Take 1 tablet (50 mg total) by mouth every 12 (twelve) hours as needed for Pain., Disp: 14 tablet, Rfl: 0    traZODone (DESYREL) 50 MG tablet, TAKE 1 TABLET (50 MG TOTAL) BY MOUTH NIGHTLY., Disp: 90 tablet, Rfl: 1    triamcinolone acetonide 0.5% (KENALOG) 0.5 % Crea, Apply topically 2 (two) times daily. for 10 days, Disp: 30 g, Rfl: 0

## 2020-10-01 DIAGNOSIS — M54.9 MID BACK PAIN: Chronic | ICD-10-CM

## 2020-10-01 DIAGNOSIS — G25.81 RLS (RESTLESS LEGS SYNDROME): Primary | ICD-10-CM

## 2020-10-01 DIAGNOSIS — F41.9 ANXIETY: ICD-10-CM

## 2020-10-01 DIAGNOSIS — F34.1 DYSTHYMIA: ICD-10-CM

## 2020-10-05 DIAGNOSIS — F34.1 DYSTHYMIA: ICD-10-CM

## 2020-10-05 DIAGNOSIS — F41.9 ANXIETY: ICD-10-CM

## 2020-10-05 DIAGNOSIS — M54.9 MID BACK PAIN: Chronic | ICD-10-CM

## 2020-10-05 RX ORDER — MELOXICAM 15 MG/1
15 TABLET ORAL DAILY
Qty: 90 TABLET | Refills: 1 | Status: SHIPPED | OUTPATIENT
Start: 2020-10-05 | End: 2020-10-06 | Stop reason: SDUPTHER

## 2020-10-05 RX ORDER — ROPINIROLE 4 MG/1
4 TABLET, FILM COATED ORAL 2 TIMES DAILY
Qty: 180 TABLET | Refills: 2 | Status: SHIPPED | OUTPATIENT
Start: 2020-10-05 | End: 2020-10-06 | Stop reason: SDUPTHER

## 2020-10-06 RX ORDER — CITALOPRAM 20 MG/1
TABLET, FILM COATED ORAL
Qty: 90 TABLET | Refills: 0 | Status: SHIPPED | OUTPATIENT
Start: 2020-10-06 | End: 2021-01-04 | Stop reason: SDUPTHER

## 2020-10-06 RX ORDER — MELOXICAM 15 MG/1
TABLET ORAL
Qty: 30 TABLET | Refills: 0 | Status: SHIPPED | OUTPATIENT
Start: 2020-10-06 | End: 2021-01-04 | Stop reason: SDUPTHER

## 2020-10-06 RX ORDER — ROPINIROLE 4 MG/1
TABLET, FILM COATED ORAL
Qty: 20 TABLET | Refills: 0 | OUTPATIENT
Start: 2020-10-06

## 2020-10-06 RX ORDER — CITALOPRAM 20 MG/1
20 TABLET, FILM COATED ORAL DAILY
Qty: 90 TABLET | Refills: 0 | Status: SHIPPED | OUTPATIENT
Start: 2020-10-06 | End: 2020-10-06 | Stop reason: SDUPTHER

## 2020-10-06 RX ORDER — ROPINIROLE 4 MG/1
4 TABLET, FILM COATED ORAL 2 TIMES DAILY
Qty: 180 TABLET | Refills: 2 | Status: SHIPPED | OUTPATIENT
Start: 2020-10-06 | End: 2020-10-11 | Stop reason: SDUPTHER

## 2020-10-06 RX ORDER — TRAZODONE HYDROCHLORIDE 50 MG/1
TABLET ORAL
Qty: 15 TABLET | Refills: 2 | Status: SHIPPED | OUTPATIENT
Start: 2020-10-06 | End: 2020-12-04

## 2020-10-07 DIAGNOSIS — F34.1 DYSTHYMIA: ICD-10-CM

## 2020-10-07 DIAGNOSIS — F41.9 ANXIETY: ICD-10-CM

## 2020-10-07 RX ORDER — CITALOPRAM 20 MG/1
10 TABLET, FILM COATED ORAL DAILY
Qty: 90 TABLET | Refills: 0 | Status: CANCELLED | OUTPATIENT
Start: 2020-10-07

## 2020-10-08 ENCOUNTER — HOSPITAL ENCOUNTER (OUTPATIENT)
Dept: RADIOLOGY | Facility: HOSPITAL | Age: 70
Discharge: HOME | End: 2020-10-08
Attending: INTERNAL MEDICINE
Payer: COMMERCIAL

## 2020-10-08 DIAGNOSIS — Z12.31 ENCOUNTER FOR SCREENING MAMMOGRAM FOR BREAST CANCER: ICD-10-CM

## 2020-10-08 PROCEDURE — 77067 SCR MAMMO BI INCL CAD: CPT | Mod: TC,PO

## 2020-10-11 RX ORDER — ROPINIROLE 4 MG/1
4 TABLET, FILM COATED ORAL 2 TIMES DAILY
Qty: 180 TABLET | Refills: 2 | Status: SHIPPED | OUTPATIENT
Start: 2020-10-11 | End: 2021-01-04 | Stop reason: SDUPTHER

## 2020-10-11 NOTE — TELEPHONE ENCOUNTER
Prescription again sent for Requip for restless leg syndrome.  Previous prescription did not seem to go.

## 2021-01-04 DIAGNOSIS — F41.9 ANXIETY: ICD-10-CM

## 2021-01-04 DIAGNOSIS — M54.9 MID BACK PAIN: Chronic | ICD-10-CM

## 2021-01-04 DIAGNOSIS — F34.1 DYSTHYMIA: ICD-10-CM

## 2021-01-04 DIAGNOSIS — G25.81 RLS (RESTLESS LEGS SYNDROME): ICD-10-CM

## 2021-01-05 RX ORDER — GABAPENTIN 300 MG/1
CAPSULE ORAL
Qty: 180 CAPSULE | Refills: 0 | Status: SHIPPED | OUTPATIENT
Start: 2021-01-05 | End: 2021-03-24

## 2021-01-05 RX ORDER — TRAMADOL HYDROCHLORIDE 50 MG/1
50 TABLET ORAL EVERY 12 HOURS PRN
Qty: 14 TABLET | Refills: 0 | OUTPATIENT
Start: 2021-01-05

## 2021-01-05 RX ORDER — CITALOPRAM 20 MG/1
10 TABLET, FILM COATED ORAL DAILY
Qty: 90 TABLET | Refills: 1 | Status: SHIPPED | OUTPATIENT
Start: 2021-01-05 | End: 2021-03-22

## 2021-01-05 RX ORDER — MELOXICAM 15 MG/1
15 TABLET ORAL DAILY
Qty: 30 TABLET | Refills: 2 | Status: SHIPPED | OUTPATIENT
Start: 2021-01-05 | End: 2021-04-15 | Stop reason: SDUPTHER

## 2021-01-05 RX ORDER — ROPINIROLE 4 MG/1
4 TABLET, FILM COATED ORAL 2 TIMES DAILY
Qty: 180 TABLET | Refills: 2 | Status: SHIPPED | OUTPATIENT
Start: 2021-01-05 | End: 2021-07-28 | Stop reason: SDUPTHER

## 2021-01-05 RX ORDER — TRAZODONE HYDROCHLORIDE 50 MG/1
TABLET ORAL
Qty: 45 TABLET | Refills: 0 | Status: SHIPPED | OUTPATIENT
Start: 2021-01-05 | End: 2021-03-24

## 2021-04-12 ENCOUNTER — OFFICE VISIT (OUTPATIENT)
Dept: FAMILY MEDICINE | Facility: CLINIC | Age: 71
End: 2021-04-12
Payer: MEDICARE

## 2021-04-12 VITALS
BODY MASS INDEX: 31.8 KG/M2 | SYSTOLIC BLOOD PRESSURE: 97 MMHG | WEIGHT: 162 LBS | DIASTOLIC BLOOD PRESSURE: 55 MMHG | HEART RATE: 80 BPM | HEIGHT: 60 IN

## 2021-04-12 DIAGNOSIS — M54.9 MID BACK PAIN: Primary | ICD-10-CM

## 2021-04-12 DIAGNOSIS — G25.81 RLS (RESTLESS LEGS SYNDROME): ICD-10-CM

## 2021-04-12 DIAGNOSIS — F34.1 DYSTHYMIA: ICD-10-CM

## 2021-04-12 PROBLEM — S09.90XA HEAD TRAUMA: Status: RESOLVED | Noted: 2019-06-11 | Resolved: 2021-04-12

## 2021-04-12 PROBLEM — J20.9 ACUTE BRONCHITIS: Status: RESOLVED | Noted: 2018-10-17 | Resolved: 2021-04-12
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
## 2021-04-12 PROCEDURE — 3008F BODY MASS INDEX DOCD: CPT | Mod: S$GLB,,, | Performed by: INTERNAL MEDICINE

## 2021-04-12 PROCEDURE — 1170F FXNL STATUS ASSESSED: CPT | Mod: S$GLB,,, | Performed by: INTERNAL MEDICINE

## 2021-04-12 PROCEDURE — 3008F PR BODY MASS INDEX (BMI) DOCUMENTED: ICD-10-PCS | Mod: S$GLB,,, | Performed by: INTERNAL MEDICINE

## 2021-04-12 PROCEDURE — 1125F PR PAIN SEVERITY QUANTIFIED, PAIN PRESENT: ICD-10-PCS | Mod: S$GLB,,, | Performed by: INTERNAL MEDICINE

## 2021-04-12 PROCEDURE — 1159F PR MEDICATION LIST DOCUMENTED IN MEDICAL RECORD: ICD-10-PCS | Mod: S$GLB,,, | Performed by: INTERNAL MEDICINE

## 2021-04-12 PROCEDURE — 99214 OFFICE O/P EST MOD 30 MIN: CPT | Mod: S$GLB,,, | Performed by: INTERNAL MEDICINE

## 2021-04-12 PROCEDURE — 1125F AMNT PAIN NOTED PAIN PRSNT: CPT | Mod: S$GLB,,, | Performed by: INTERNAL MEDICINE

## 2021-04-12 PROCEDURE — 3288F FALL RISK ASSESSMENT DOCD: CPT | Mod: S$GLB,,, | Performed by: INTERNAL MEDICINE

## 2021-04-12 PROCEDURE — 3288F PR FALLS RISK ASSESSMENT DOCUMENTED: ICD-10-PCS | Mod: S$GLB,,, | Performed by: INTERNAL MEDICINE

## 2021-04-12 PROCEDURE — 99214 PR OFFICE/OUTPT VISIT, EST, LEVL IV, 30-39 MIN: ICD-10-PCS | Mod: S$GLB,,, | Performed by: INTERNAL MEDICINE

## 2021-04-12 PROCEDURE — 1101F PR PT FALLS ASSESS DOC 0-1 FALLS W/OUT INJ PAST YR: ICD-10-PCS | Mod: S$GLB,,, | Performed by: INTERNAL MEDICINE

## 2021-04-12 PROCEDURE — 1170F PR FUNCTIONAL STATUS ASSESSED: ICD-10-PCS | Mod: S$GLB,,, | Performed by: INTERNAL MEDICINE

## 2021-04-12 PROCEDURE — 1101F PT FALLS ASSESS-DOCD LE1/YR: CPT | Mod: S$GLB,,, | Performed by: INTERNAL MEDICINE

## 2021-04-12 PROCEDURE — 1159F MED LIST DOCD IN RCRD: CPT | Mod: S$GLB,,, | Performed by: INTERNAL MEDICINE

## 2021-04-12 RX ORDER — TRAMADOL HYDROCHLORIDE 50 MG/1
50 TABLET ORAL EVERY 12 HOURS PRN
Qty: 14 TABLET | Refills: 0 | Status: SHIPPED | OUTPATIENT
Start: 2021-04-12 | End: 2021-07-14 | Stop reason: SDUPTHER

## 2021-04-13 ENCOUNTER — PATIENT MESSAGE (OUTPATIENT)
Dept: RHEUMATOLOGY | Facility: CLINIC | Age: 71
End: 2021-04-13

## 2021-04-15 DIAGNOSIS — M54.9 MID BACK PAIN: Chronic | ICD-10-CM

## 2021-04-16 ENCOUNTER — TELEPHONE (OUTPATIENT)
Dept: SPINE | Facility: CLINIC | Age: 71
End: 2021-04-16

## 2021-04-19 RX ORDER — MELOXICAM 15 MG/1
15 TABLET ORAL DAILY
Qty: 30 TABLET | Refills: 2 | Status: SHIPPED | OUTPATIENT
Start: 2021-04-19 | End: 2021-06-09

## 2021-04-26 ENCOUNTER — TELEPHONE (OUTPATIENT)
Dept: FAMILY MEDICINE | Facility: CLINIC | Age: 71
End: 2021-04-26

## 2021-04-26 ENCOUNTER — LAB VISIT (OUTPATIENT)
Dept: LAB | Facility: HOSPITAL | Age: 71
End: 2021-04-26
Attending: INTERNAL MEDICINE
Payer: MEDICARE

## 2021-04-26 DIAGNOSIS — M54.9 MID BACK PAIN: Primary | ICD-10-CM

## 2021-04-26 DIAGNOSIS — M54.9 MID BACK PAIN: ICD-10-CM

## 2021-04-26 DIAGNOSIS — N34.2 INFECTIVE URETHRITIS: ICD-10-CM

## 2021-04-26 LAB
ALBUMIN SERPL BCP-MCNC: 3.5 G/DL (ref 3.5–5.2)
ALP SERPL-CCNC: 76 U/L (ref 55–135)
ALT SERPL W/O P-5'-P-CCNC: 11 U/L (ref 10–44)
ANION GAP SERPL CALC-SCNC: 8 MMOL/L (ref 8–16)
AST SERPL-CCNC: 13 U/L (ref 10–40)
BACTERIA #/AREA URNS HPF: ABNORMAL /HPF
BASOPHILS # BLD AUTO: 0.11 K/UL (ref 0–0.2)
BASOPHILS NFR BLD: 1.6 % (ref 0–1.9)
BILIRUB SERPL-MCNC: 0.7 MG/DL (ref 0.1–1)
BILIRUB UR QL STRIP: NEGATIVE
BUN SERPL-MCNC: 13 MG/DL (ref 8–23)
CALCIUM SERPL-MCNC: 8.6 MG/DL (ref 8.7–10.5)
CHLORIDE SERPL-SCNC: 108 MMOL/L (ref 95–110)
CLARITY UR: ABNORMAL
CO2 SERPL-SCNC: 25 MMOL/L (ref 23–29)
COLOR UR: YELLOW
CREAT SERPL-MCNC: 0.9 MG/DL (ref 0.5–1.4)
DIFFERENTIAL METHOD: ABNORMAL
EOSINOPHIL # BLD AUTO: 0.8 K/UL (ref 0–0.5)
EOSINOPHIL NFR BLD: 11.1 % (ref 0–8)
ERYTHROCYTE [DISTWIDTH] IN BLOOD BY AUTOMATED COUNT: 13.4 % (ref 11.5–14.5)
ERYTHROCYTE [SEDIMENTATION RATE] IN BLOOD BY WESTERGREN METHOD: 13 MM/HR (ref 0–20)
EST. GFR  (AFRICAN AMERICAN): >60 ML/MIN/1.73 M^2
EST. GFR  (NON AFRICAN AMERICAN): >60 ML/MIN/1.73 M^2
GLUCOSE SERPL-MCNC: 107 MG/DL (ref 70–110)
GLUCOSE UR QL STRIP: NEGATIVE
HCT VFR BLD AUTO: 39.6 % (ref 37–48.5)
HGB BLD-MCNC: 12.5 G/DL (ref 12–16)
HGB UR QL STRIP: NEGATIVE
HYALINE CASTS #/AREA URNS LPF: 100 /LPF
IMM GRANULOCYTES # BLD AUTO: 0.02 K/UL (ref 0–0.04)
IMM GRANULOCYTES NFR BLD AUTO: 0.3 % (ref 0–0.5)
KETONES UR QL STRIP: NEGATIVE
LEUKOCYTE ESTERASE UR QL STRIP: ABNORMAL
LYMPHOCYTES # BLD AUTO: 1.8 K/UL (ref 1–4.8)
LYMPHOCYTES NFR BLD: 25.1 % (ref 18–48)
MCH RBC QN AUTO: 28.3 PG (ref 27–31)
MCHC RBC AUTO-ENTMCNC: 31.6 G/DL (ref 32–36)
MCV RBC AUTO: 90 FL (ref 82–98)
MICROSCOPIC COMMENT: ABNORMAL
MONOCYTES # BLD AUTO: 0.6 K/UL (ref 0.3–1)
MONOCYTES NFR BLD: 8.8 % (ref 4–15)
NEUTROPHILS # BLD AUTO: 3.7 K/UL (ref 1.8–7.7)
NEUTROPHILS NFR BLD: 53.1 % (ref 38–73)
NITRITE UR QL STRIP: POSITIVE
NRBC BLD-RTO: 0 /100 WBC
PH UR STRIP: 6 [PH] (ref 5–8)
PLATELET # BLD AUTO: 272 K/UL (ref 150–450)
PMV BLD AUTO: 10.1 FL (ref 9.2–12.9)
POTASSIUM SERPL-SCNC: 4.4 MMOL/L (ref 3.5–5.1)
PROT SERPL-MCNC: 6.6 G/DL (ref 6–8.4)
PROT UR QL STRIP: ABNORMAL
RBC # BLD AUTO: 4.41 M/UL (ref 4–5.4)
RBC #/AREA URNS HPF: 5 /HPF (ref 0–4)
SODIUM SERPL-SCNC: 141 MMOL/L (ref 136–145)
SP GR UR STRIP: 1.03 (ref 1–1.03)
SQUAMOUS #/AREA URNS HPF: 2 /HPF
URN SPEC COLLECT METH UR: ABNORMAL
UROBILINOGEN UR STRIP-ACNC: NEGATIVE EU/DL
WBC # BLD AUTO: 6.96 K/UL (ref 3.9–12.7)
WBC #/AREA URNS HPF: 79 /HPF (ref 0–5)

## 2021-04-26 PROCEDURE — 85651 RBC SED RATE NONAUTOMATED: CPT | Performed by: INTERNAL MEDICINE

## 2021-04-26 PROCEDURE — 36415 COLL VENOUS BLD VENIPUNCTURE: CPT | Performed by: INTERNAL MEDICINE

## 2021-04-26 PROCEDURE — 85025 COMPLETE CBC W/AUTO DIFF WBC: CPT | Performed by: INTERNAL MEDICINE

## 2021-04-26 PROCEDURE — 80053 COMPREHEN METABOLIC PANEL: CPT | Performed by: INTERNAL MEDICINE

## 2021-04-26 PROCEDURE — 81001 URINALYSIS AUTO W/SCOPE: CPT | Performed by: INTERNAL MEDICINE

## 2021-04-26 RX ORDER — NITROFURANTOIN 25; 75 MG/1; MG/1
100 CAPSULE ORAL 2 TIMES DAILY
Qty: 14 CAPSULE | Refills: 0 | Status: SHIPPED | OUTPATIENT
Start: 2021-04-26 | End: 2022-05-10

## 2021-04-27 ENCOUNTER — TELEPHONE (OUTPATIENT)
Dept: FAMILY MEDICINE | Facility: CLINIC | Age: 71
End: 2021-04-27

## 2021-04-28 ENCOUNTER — IMMUNIZATION (OUTPATIENT)
Dept: PRIMARY CARE CLINIC | Facility: CLINIC | Age: 71
End: 2021-04-28
Payer: MEDICARE

## 2021-04-28 DIAGNOSIS — Z23 NEED FOR VACCINATION: Primary | ICD-10-CM

## 2021-04-28 PROCEDURE — 91300 COVID-19, MRNA, LNP-S, PF, 30 MCG/0.3 ML DOSE VACCINE: CPT | Mod: S$GLB,,, | Performed by: FAMILY MEDICINE

## 2021-04-28 PROCEDURE — 0001A COVID-19, MRNA, LNP-S, PF, 30 MCG/0.3 ML DOSE VACCINE: CPT | Mod: CV19,S$GLB,, | Performed by: FAMILY MEDICINE

## 2021-04-28 PROCEDURE — 0001A COVID-19, MRNA, LNP-S, PF, 30 MCG/0.3 ML DOSE VACCINE: ICD-10-PCS | Mod: CV19,S$GLB,, | Performed by: FAMILY MEDICINE

## 2021-04-28 PROCEDURE — 91300 COVID-19, MRNA, LNP-S, PF, 30 MCG/0.3 ML DOSE VACCINE: ICD-10-PCS | Mod: S$GLB,,, | Performed by: FAMILY MEDICINE

## 2021-05-19 ENCOUNTER — IMMUNIZATION (OUTPATIENT)
Dept: PRIMARY CARE CLINIC | Facility: CLINIC | Age: 71
End: 2021-05-19
Payer: MEDICARE

## 2021-05-19 DIAGNOSIS — Z23 NEED FOR VACCINATION: Primary | ICD-10-CM

## 2021-05-19 PROCEDURE — 91300 COVID-19, MRNA, LNP-S, PF, 30 MCG/0.3 ML DOSE VACCINE: CPT | Mod: S$GLB,,, | Performed by: FAMILY MEDICINE

## 2021-05-19 PROCEDURE — 91300 COVID-19, MRNA, LNP-S, PF, 30 MCG/0.3 ML DOSE VACCINE: ICD-10-PCS | Mod: S$GLB,,, | Performed by: FAMILY MEDICINE

## 2021-05-19 PROCEDURE — 0002A COVID-19, MRNA, LNP-S, PF, 30 MCG/0.3 ML DOSE VACCINE: CPT | Mod: CV19,S$GLB,, | Performed by: FAMILY MEDICINE

## 2021-05-19 PROCEDURE — 0002A COVID-19, MRNA, LNP-S, PF, 30 MCG/0.3 ML DOSE VACCINE: ICD-10-PCS | Mod: CV19,S$GLB,, | Performed by: FAMILY MEDICINE

## 2021-07-01 DIAGNOSIS — F41.9 ANXIETY: ICD-10-CM

## 2021-07-01 DIAGNOSIS — F34.1 DYSTHYMIA: ICD-10-CM

## 2021-07-01 RX ORDER — CITALOPRAM 20 MG/1
20 TABLET, FILM COATED ORAL DAILY
Qty: 90 TABLET | Refills: 0 | Status: SHIPPED | OUTPATIENT
Start: 2021-07-01 | End: 2021-07-28 | Stop reason: SDUPTHER

## 2021-07-14 ENCOUNTER — OFFICE VISIT (OUTPATIENT)
Dept: FAMILY MEDICINE | Facility: CLINIC | Age: 71
End: 2021-07-14
Payer: MEDICARE

## 2021-07-14 VITALS
WEIGHT: 164 LBS | BODY MASS INDEX: 32.2 KG/M2 | HEIGHT: 60 IN | DIASTOLIC BLOOD PRESSURE: 71 MMHG | SYSTOLIC BLOOD PRESSURE: 115 MMHG | RESPIRATION RATE: 19 BRPM

## 2021-07-14 DIAGNOSIS — M54.9 DORSALGIA, UNSPECIFIED: ICD-10-CM

## 2021-07-14 DIAGNOSIS — F34.1 DYSTHYMIA: ICD-10-CM

## 2021-07-14 DIAGNOSIS — G25.81 RLS (RESTLESS LEGS SYNDROME): ICD-10-CM

## 2021-07-14 DIAGNOSIS — K21.9 GASTROESOPHAGEAL REFLUX DISEASE WITHOUT ESOPHAGITIS: ICD-10-CM

## 2021-07-14 DIAGNOSIS — G89.29 CHRONIC MIDLINE LOW BACK PAIN WITHOUT SCIATICA: Primary | Chronic | ICD-10-CM

## 2021-07-14 DIAGNOSIS — M54.50 CHRONIC MIDLINE LOW BACK PAIN WITHOUT SCIATICA: Primary | Chronic | ICD-10-CM

## 2021-07-14 DIAGNOSIS — J31.0 CHRONIC RHINITIS: ICD-10-CM

## 2021-07-14 DIAGNOSIS — Z13.820 ENCOUNTER FOR OSTEOPOROSIS SCREENING IN ASYMPTOMATIC POSTMENOPAUSAL PATIENT: ICD-10-CM

## 2021-07-14 DIAGNOSIS — Z78.0 ENCOUNTER FOR OSTEOPOROSIS SCREENING IN ASYMPTOMATIC POSTMENOPAUSAL PATIENT: ICD-10-CM

## 2021-07-14 DIAGNOSIS — F51.04 PSYCHOPHYSIOLOGICAL INSOMNIA: ICD-10-CM

## 2021-07-14 PROCEDURE — 1125F PR PAIN SEVERITY QUANTIFIED, PAIN PRESENT: ICD-10-PCS | Mod: S$GLB,,, | Performed by: INTERNAL MEDICINE

## 2021-07-14 PROCEDURE — 1159F PR MEDICATION LIST DOCUMENTED IN MEDICAL RECORD: ICD-10-PCS | Mod: S$GLB,,, | Performed by: INTERNAL MEDICINE

## 2021-07-14 PROCEDURE — 1159F MED LIST DOCD IN RCRD: CPT | Mod: S$GLB,,, | Performed by: INTERNAL MEDICINE

## 2021-07-14 PROCEDURE — 99214 PR OFFICE/OUTPT VISIT, EST, LEVL IV, 30-39 MIN: ICD-10-PCS | Mod: S$GLB,,, | Performed by: INTERNAL MEDICINE

## 2021-07-14 PROCEDURE — 3008F PR BODY MASS INDEX (BMI) DOCUMENTED: ICD-10-PCS | Mod: S$GLB,,, | Performed by: INTERNAL MEDICINE

## 2021-07-14 PROCEDURE — 3008F BODY MASS INDEX DOCD: CPT | Mod: S$GLB,,, | Performed by: INTERNAL MEDICINE

## 2021-07-14 PROCEDURE — 1125F AMNT PAIN NOTED PAIN PRSNT: CPT | Mod: S$GLB,,, | Performed by: INTERNAL MEDICINE

## 2021-07-14 PROCEDURE — 99214 OFFICE O/P EST MOD 30 MIN: CPT | Mod: S$GLB,,, | Performed by: INTERNAL MEDICINE

## 2021-07-14 RX ORDER — TRAZODONE HYDROCHLORIDE 50 MG/1
50 TABLET ORAL NIGHTLY
Qty: 90 TABLET | Refills: 1 | Status: SHIPPED | OUTPATIENT
Start: 2021-07-14 | End: 2021-10-26 | Stop reason: SDUPTHER

## 2021-07-14 RX ORDER — TRAMADOL HYDROCHLORIDE 50 MG/1
50 TABLET ORAL EVERY 8 HOURS PRN
Qty: 30 TABLET | Refills: 1 | Status: SHIPPED | OUTPATIENT
Start: 2021-07-14 | End: 2021-07-15 | Stop reason: SDUPTHER

## 2021-07-15 ENCOUNTER — TELEPHONE (OUTPATIENT)
Dept: FAMILY MEDICINE | Facility: CLINIC | Age: 71
End: 2021-07-15

## 2021-07-15 DIAGNOSIS — M54.9 MID BACK PAIN: ICD-10-CM

## 2021-07-15 RX ORDER — TRAMADOL HYDROCHLORIDE 50 MG/1
50 TABLET ORAL NIGHTLY PRN
Qty: 7 TABLET | Refills: 0 | Status: SHIPPED | OUTPATIENT
Start: 2021-07-15 | End: 2021-07-22

## 2021-07-20 ENCOUNTER — HOSPITAL ENCOUNTER (OUTPATIENT)
Dept: RADIOLOGY | Facility: HOSPITAL | Age: 71
Discharge: HOME | End: 2021-07-20
Attending: INTERNAL MEDICINE
Payer: MEDICARE

## 2021-07-20 ENCOUNTER — TELEPHONE (OUTPATIENT)
Dept: FAMILY MEDICINE | Facility: CLINIC | Age: 71
End: 2021-07-20

## 2021-07-20 ENCOUNTER — OFFICE VISIT (OUTPATIENT)
Dept: SPINE | Facility: CLINIC | Age: 71
End: 2021-07-20
Payer: MEDICARE

## 2021-07-20 ENCOUNTER — HOSPITAL ENCOUNTER (OUTPATIENT)
Dept: RADIOLOGY | Facility: HOSPITAL | Age: 71
Discharge: HOME | End: 2021-07-20
Attending: PHYSICAL MEDICINE & REHABILITATION
Payer: MEDICARE

## 2021-07-20 VITALS — WEIGHT: 164 LBS | HEIGHT: 60 IN | BODY MASS INDEX: 32.2 KG/M2

## 2021-07-20 DIAGNOSIS — Z78.0 ENCOUNTER FOR OSTEOPOROSIS SCREENING IN ASYMPTOMATIC POSTMENOPAUSAL PATIENT: ICD-10-CM

## 2021-07-20 DIAGNOSIS — R20.0 RIGHT ARM NUMBNESS: ICD-10-CM

## 2021-07-20 DIAGNOSIS — R20.0 RIGHT ARM NUMBNESS: Primary | ICD-10-CM

## 2021-07-20 DIAGNOSIS — Z13.820 ENCOUNTER FOR OSTEOPOROSIS SCREENING IN ASYMPTOMATIC POSTMENOPAUSAL PATIENT: ICD-10-CM

## 2021-07-20 DIAGNOSIS — M54.50 CHRONIC MIDLINE LOW BACK PAIN WITHOUT SCIATICA: Chronic | ICD-10-CM

## 2021-07-20 DIAGNOSIS — M54.9 DORSALGIA, UNSPECIFIED: ICD-10-CM

## 2021-07-20 DIAGNOSIS — G89.29 CHRONIC MIDLINE LOW BACK PAIN WITHOUT SCIATICA: Chronic | ICD-10-CM

## 2021-07-20 PROCEDURE — 1159F MED LIST DOCD IN RCRD: CPT | Mod: CPTII,S$GLB,, | Performed by: PHYSICAL MEDICINE & REHABILITATION

## 2021-07-20 PROCEDURE — 1125F PR PAIN SEVERITY QUANTIFIED, PAIN PRESENT: ICD-10-PCS | Mod: CPTII,S$GLB,, | Performed by: PHYSICAL MEDICINE & REHABILITATION

## 2021-07-20 PROCEDURE — 3288F FALL RISK ASSESSMENT DOCD: CPT | Mod: CPTII,S$GLB,, | Performed by: PHYSICAL MEDICINE & REHABILITATION

## 2021-07-20 PROCEDURE — 1101F PR PT FALLS ASSESS DOC 0-1 FALLS W/OUT INJ PAST YR: ICD-10-PCS | Mod: CPTII,S$GLB,, | Performed by: PHYSICAL MEDICINE & REHABILITATION

## 2021-07-20 PROCEDURE — 3008F PR BODY MASS INDEX (BMI) DOCUMENTED: ICD-10-PCS | Mod: CPTII,S$GLB,, | Performed by: PHYSICAL MEDICINE & REHABILITATION

## 2021-07-20 PROCEDURE — 3288F PR FALLS RISK ASSESSMENT DOCUMENTED: ICD-10-PCS | Mod: CPTII,S$GLB,, | Performed by: PHYSICAL MEDICINE & REHABILITATION

## 2021-07-20 PROCEDURE — 99204 OFFICE O/P NEW MOD 45 MIN: CPT | Mod: S$GLB,,, | Performed by: PHYSICAL MEDICINE & REHABILITATION

## 2021-07-20 PROCEDURE — 72110 X-RAY EXAM L-2 SPINE 4/>VWS: CPT | Mod: TC,PO

## 2021-07-20 PROCEDURE — 72050 X-RAY EXAM NECK SPINE 4/5VWS: CPT | Mod: TC,PO

## 2021-07-20 PROCEDURE — 1159F PR MEDICATION LIST DOCUMENTED IN MEDICAL RECORD: ICD-10-PCS | Mod: CPTII,S$GLB,, | Performed by: PHYSICAL MEDICINE & REHABILITATION

## 2021-07-20 PROCEDURE — 3008F BODY MASS INDEX DOCD: CPT | Mod: CPTII,S$GLB,, | Performed by: PHYSICAL MEDICINE & REHABILITATION

## 2021-07-20 PROCEDURE — 1125F AMNT PAIN NOTED PAIN PRSNT: CPT | Mod: CPTII,S$GLB,, | Performed by: PHYSICAL MEDICINE & REHABILITATION

## 2021-07-20 PROCEDURE — 1101F PT FALLS ASSESS-DOCD LE1/YR: CPT | Mod: CPTII,S$GLB,, | Performed by: PHYSICAL MEDICINE & REHABILITATION

## 2021-07-20 PROCEDURE — 99204 PR OFFICE/OUTPT VISIT, NEW, LEVL IV, 45-59 MIN: ICD-10-PCS | Mod: S$GLB,,, | Performed by: PHYSICAL MEDICINE & REHABILITATION

## 2021-07-20 PROCEDURE — 77080 DXA BONE DENSITY AXIAL: CPT | Mod: TC,PO

## 2021-07-20 PROCEDURE — 77081 DXA BONE DENSITY APPENDICULR: CPT | Mod: TC,PO

## 2021-07-21 ENCOUNTER — TELEPHONE (OUTPATIENT)
Dept: SPINE | Facility: CLINIC | Age: 71
End: 2021-07-21

## 2021-07-28 DIAGNOSIS — G25.81 RLS (RESTLESS LEGS SYNDROME): ICD-10-CM

## 2021-07-28 DIAGNOSIS — F34.1 DYSTHYMIA: ICD-10-CM

## 2021-07-28 DIAGNOSIS — F41.9 ANXIETY: ICD-10-CM

## 2021-07-28 RX ORDER — CITALOPRAM 20 MG/1
20 TABLET, FILM COATED ORAL DAILY
Qty: 90 TABLET | Refills: 1 | Status: SHIPPED | OUTPATIENT
Start: 2021-07-28 | End: 2022-02-08 | Stop reason: SDUPTHER

## 2021-07-28 RX ORDER — ROPINIROLE 4 MG/1
4 TABLET, FILM COATED ORAL 2 TIMES DAILY
Qty: 180 TABLET | Refills: 2 | Status: SHIPPED | OUTPATIENT
Start: 2021-07-28 | End: 2022-07-22

## 2021-07-30 ENCOUNTER — PATIENT MESSAGE (OUTPATIENT)
Dept: SPINE | Facility: CLINIC | Age: 71
End: 2021-07-30

## 2021-08-10 ENCOUNTER — CLINICAL SUPPORT (OUTPATIENT)
Dept: REHABILITATION | Facility: HOSPITAL | Age: 71
End: 2021-08-10
Payer: MEDICARE

## 2021-08-10 DIAGNOSIS — M54.42 CHRONIC BILATERAL LOW BACK PAIN WITH BILATERAL SCIATICA: ICD-10-CM

## 2021-08-10 DIAGNOSIS — M54.41 CHRONIC BILATERAL LOW BACK PAIN WITH BILATERAL SCIATICA: ICD-10-CM

## 2021-08-10 DIAGNOSIS — R29.898 WEAKNESS OF BOTH HIPS: ICD-10-CM

## 2021-08-10 DIAGNOSIS — G89.29 CHRONIC BILATERAL LOW BACK PAIN WITH BILATERAL SCIATICA: ICD-10-CM

## 2021-08-10 PROCEDURE — 97162 PT EVAL MOD COMPLEX 30 MIN: CPT

## 2021-08-17 ENCOUNTER — DOCUMENTATION ONLY (OUTPATIENT)
Dept: REHABILITATION | Facility: HOSPITAL | Age: 71
End: 2021-08-17

## 2021-08-17 ENCOUNTER — CLINICAL SUPPORT (OUTPATIENT)
Dept: REHABILITATION | Facility: HOSPITAL | Age: 71
End: 2021-08-17
Payer: MEDICARE

## 2021-08-17 DIAGNOSIS — R29.898 WEAKNESS OF BOTH HIPS: ICD-10-CM

## 2021-08-17 DIAGNOSIS — G89.29 CHRONIC BILATERAL LOW BACK PAIN WITH BILATERAL SCIATICA: ICD-10-CM

## 2021-08-17 DIAGNOSIS — M54.41 CHRONIC BILATERAL LOW BACK PAIN WITH BILATERAL SCIATICA: ICD-10-CM

## 2021-08-17 DIAGNOSIS — M54.42 CHRONIC BILATERAL LOW BACK PAIN WITH BILATERAL SCIATICA: ICD-10-CM

## 2021-08-17 PROCEDURE — 97110 THERAPEUTIC EXERCISES: CPT | Mod: CQ

## 2021-08-19 ENCOUNTER — CLINICAL SUPPORT (OUTPATIENT)
Dept: REHABILITATION | Facility: HOSPITAL | Age: 71
End: 2021-08-19
Payer: MEDICARE

## 2021-08-19 DIAGNOSIS — R29.898 WEAKNESS OF BOTH HIPS: ICD-10-CM

## 2021-08-19 DIAGNOSIS — M54.41 CHRONIC BILATERAL LOW BACK PAIN WITH BILATERAL SCIATICA: ICD-10-CM

## 2021-08-19 DIAGNOSIS — G89.29 CHRONIC BILATERAL LOW BACK PAIN WITH BILATERAL SCIATICA: ICD-10-CM

## 2021-08-19 DIAGNOSIS — M54.42 CHRONIC BILATERAL LOW BACK PAIN WITH BILATERAL SCIATICA: ICD-10-CM

## 2021-08-19 PROCEDURE — 97530 THERAPEUTIC ACTIVITIES: CPT | Mod: CQ

## 2021-08-19 PROCEDURE — 97110 THERAPEUTIC EXERCISES: CPT | Mod: CQ

## 2021-08-24 ENCOUNTER — DOCUMENTATION ONLY (OUTPATIENT)
Dept: REHABILITATION | Facility: HOSPITAL | Age: 71
End: 2021-08-24

## 2021-08-24 ENCOUNTER — CLINICAL SUPPORT (OUTPATIENT)
Dept: REHABILITATION | Facility: HOSPITAL | Age: 71
End: 2021-08-24
Payer: MEDICARE

## 2021-08-24 DIAGNOSIS — M54.41 CHRONIC BILATERAL LOW BACK PAIN WITH BILATERAL SCIATICA: ICD-10-CM

## 2021-08-24 DIAGNOSIS — R29.898 WEAKNESS OF BOTH HIPS: ICD-10-CM

## 2021-08-24 DIAGNOSIS — G89.29 CHRONIC BILATERAL LOW BACK PAIN WITH BILATERAL SCIATICA: ICD-10-CM

## 2021-08-24 DIAGNOSIS — M54.42 CHRONIC BILATERAL LOW BACK PAIN WITH BILATERAL SCIATICA: ICD-10-CM

## 2021-08-24 PROCEDURE — 97530 THERAPEUTIC ACTIVITIES: CPT | Mod: CQ

## 2021-08-24 PROCEDURE — 97110 THERAPEUTIC EXERCISES: CPT | Mod: CQ

## 2021-09-10 ENCOUNTER — DOCUMENTATION ONLY (OUTPATIENT)
Dept: REHABILITATION | Facility: HOSPITAL | Age: 71
End: 2021-09-10

## 2021-10-26 DIAGNOSIS — F51.04 PSYCHOPHYSIOLOGICAL INSOMNIA: ICD-10-CM

## 2021-10-26 RX ORDER — TRAZODONE HYDROCHLORIDE 50 MG/1
50 TABLET ORAL NIGHTLY
Qty: 90 TABLET | Refills: 1 | Status: SHIPPED | OUTPATIENT
Start: 2021-10-26 | End: 2022-05-10 | Stop reason: SDUPTHER

## 2021-10-28 ENCOUNTER — TELEPHONE (OUTPATIENT)
Dept: FAMILY MEDICINE | Facility: CLINIC | Age: 71
End: 2021-10-28
Payer: MEDICARE

## 2022-01-23 ENCOUNTER — PATIENT MESSAGE (OUTPATIENT)
Dept: FAMILY MEDICINE | Facility: CLINIC | Age: 72
End: 2022-01-23
Payer: MEDICARE

## 2022-01-24 ENCOUNTER — TELEPHONE (OUTPATIENT)
Dept: FAMILY MEDICINE | Facility: CLINIC | Age: 72
End: 2022-01-24
Payer: MEDICARE

## 2022-01-24 NOTE — TELEPHONE ENCOUNTER
----- Message from Jesus Colon MD sent at 1/23/2022  1:12 PM CST -----  Check with patient if she is using meloxicam and gabapentin and what dosage for back pain.  any side effects?.  I got a request from pharmacy for gabapentin med is it has been listed as an allergy.    Dr. Darlene DAMIAN

## 2022-02-08 DIAGNOSIS — F34.1 DYSTHYMIA: ICD-10-CM

## 2022-02-08 DIAGNOSIS — F41.9 ANXIETY: ICD-10-CM

## 2022-02-08 RX ORDER — CITALOPRAM 20 MG/1
20 TABLET, FILM COATED ORAL DAILY
Qty: 90 TABLET | Refills: 0 | Status: SHIPPED | OUTPATIENT
Start: 2022-02-08 | End: 2022-05-03

## 2022-05-09 NOTE — PROGRESS NOTES
Subjective:       Patient ID: Vesta Cousin is a 71 y.o. female.    Chief Complaint: Anxiety, Depression, Numbness (In arm ), Chest Pain, and Fatigue    Pt is  a 71-year-old  female who comes for follow-up.    Her underlying medical issues are as below:-    1.-chronic anxiety and stress  2.-chronic insomnia  3.-new complains of chest pain in the left upper chest which waxes and wanes and has no relationship either with rest or exertion.    She has never had a cardiac workup done in past.    4. Complains of pain and numbness in the right home.    Depression  Visit Type: follow-up  Patient presents with the following symptoms: anhedonia, decreased concentration, excessive worry, malaise, muscle tension and obsessions.  Patient is not experiencing: confusion, nervousness/anxiety, palpitations and shortness of breath.  Frequency of symptoms: most days     Anxiety  Presents for follow-up visit. Symptoms include decreased concentration, excessive worry, malaise, muscle tension and obsessions. Patient reports no chest pain, confusion, dizziness, nervous/anxious behavior, palpitations or shortness of breath. Symptoms occur most days.       Chest Pain   This is a new problem. The onset quality is undetermined. The problem occurs constantly. The problem has been waxing and waning. The pain is present in the lateral region. The pain is at a severity of 4/10. Associated symptoms include back pain. Pertinent negatives include no abdominal pain, cough, diaphoresis, dizziness, fever, headaches, palpitations or shortness of breath.   Arm Pain   The incident occurred more than 1 week ago. There was no injury mechanism. Pertinent negatives include no chest pain. The treatment provided no relief.       Past Medical History:   Diagnosis Date    Abdominal pain, acute     Abdominal wall mass of epigastric region     Acid reflux     Acute bronchitis 10/17/2018    Acute low back pain     Annual physical exam     Anxiety      Bilateral shoulder pain     Body mass index (bmi) 31.0-31.9, adult     Cervical spondylosis     Chronic right shoulder pain     Decreased GFR     Depression     Fall 6/11/2019    Head trauma 6/11/2019    Hearing aid worn     bilateral    History of ulcerative colitis     History of vitamin D deficiency 4/24/2018    HNP (herniated nucleus pulposus), lumbar     Hyperlipidemia     Insomnia     Kidney stone     Left-sided low back pain without sciatica     Non-smoker     Osteoarthritis     Osteoarthritis of left glenohumeral joint     Osteopenia     Osteoporosis screening     Palpable abdominal mass     Peripheral neuropathy     Plantar fasciitis of left foot     RLS (restless legs syndrome)     Screening for colorectal cancer     Tear of right rotator cuff     Visit for screening mammogram      Social History     Socioeconomic History    Marital status:     Number of children: 2   Tobacco Use    Smoking status: Never Smoker    Smokeless tobacco: Never Used   Substance and Sexual Activity    Alcohol use: Yes     Comment: socially. luís    Drug use: No    Sexual activity: Yes     Partners: Male   Social History Narrative        2 sons    Works part time at [employer]     Social Determinants of Health     Financial Resource Strain: Medium Risk    Difficulty of Paying Living Expenses: Somewhat hard   Food Insecurity: No Food Insecurity    Worried About Running Out of Food in the Last Year: Never true    Ran Out of Food in the Last Year: Never true   Transportation Needs: No Transportation Needs    Lack of Transportation (Medical): No    Lack of Transportation (Non-Medical): No   Physical Activity: Inactive    Days of Exercise per Week: 0 days    Minutes of Exercise per Session: 0 min   Stress: Stress Concern Present    Feeling of Stress : To some extent   Social Connections: Socially Isolated    Frequency of Communication with Friends and Family: Once a  week    Frequency of Social Gatherings with Friends and Family: Once a week    Attends Buddhism Services: Never    Active Member of Clubs or Organizations: No    Attends Club or Organization Meetings: Never    Marital Status:    Housing Stability: Low Risk     Unable to Pay for Housing in the Last Year: No    Number of Places Lived in the Last Year: 1    Unstable Housing in the Last Year: No     Past Surgical History:   Procedure Laterality Date    CHOLECYSTECTOMY      COLON SURGERY      HEEL SPUR SURGERY      JOINT REPLACEMENT Bilateral     hip    partial colectomy      for ulcerative colitis    SHOULDER SURGERY  05/2016    TONSILLECTOMY      total right hip replacement       Family History   Problem Relation Age of Onset    Arthritis Father     Heart disease Father        Review of Systems   Constitutional: Positive for fatigue. Negative for activity change, appetite change, chills, diaphoresis, fever and unexpected weight change.        Difficult historian   HENT: Positive for congestion and postnasal drip. Negative for sinus pressure and sore throat.         Chronic on and off sinus problems currently on a combination of Zyrtec and Atrovent nasal spray.   Eyes: Negative for pain, discharge and visual disturbance.   Respiratory: Negative for cough, chest tightness, shortness of breath, wheezing and stridor.    Cardiovascular: Negative for chest pain, palpitations and leg swelling.   Gastrointestinal: Negative for abdominal distention, abdominal pain, blood in stool, constipation and diarrhea.   Endocrine: Negative for cold intolerance, polydipsia and polyuria.   Genitourinary: Negative for difficulty urinating, dysuria, flank pain, frequency and pelvic pain.   Musculoskeletal: Positive for arthralgias, back pain, gait problem, myalgias and neck pain. Negative for joint swelling.   Skin: Negative for color change and pallor.   Neurological: Negative for dizziness, facial asymmetry,  light-headedness and headaches.        Symptoms of restless leg syndrome and was prescribed Requip in past.   Psychiatric/Behavioral: Positive for decreased concentration, depression and sleep disturbance. Negative for confusion. The patient is not nervous/anxious.         Mood disorder correct tries with some degree of anxiety and depression.  Currently on citalopram.  Not on benzodiazepines.         Objective:      Blood pressure (!) 103/57, pulse 74, height 5' (1.524 m), weight 70.8 kg (156 lb). Body mass index is 30.47 kg/m².  Physical Exam  Vitals and nursing note reviewed.   Constitutional:       General: She is not in acute distress.     Appearance: She is well-developed. She is ill-appearing (Chronically unwell appearing). She is not toxic-appearing or diaphoretic.      Comments: BMI is 30.47   HENT:      Head: Normocephalic and atraumatic.   Eyes:      General: Lids are normal. Lids are everted, no foreign bodies appreciated. No scleral icterus.     Conjunctiva/sclera: Conjunctivae normal.   Neck:      Thyroid: No thyromegaly.      Trachea: Trachea normal.   Cardiovascular:      Rate and Rhythm: Normal rate and regular rhythm.      Heart sounds: Normal heart sounds, S1 normal and S2 normal.   Pulmonary:      Effort: No respiratory distress.      Breath sounds: Normal breath sounds. No rhonchi.   Chest:       Abdominal:      General: There is no distension.      Palpations: Abdomen is soft. Abdomen is not rigid.      Tenderness: There is no abdominal tenderness.   Musculoskeletal:         General: No deformity.      Cervical back: Neck supple.      Lumbar back: No swelling or tenderness. Normal range of motion.        Back:       Right lower leg: No edema.      Left lower leg: No edema.   Lymphadenopathy:      Cervical: No cervical adenopathy.   Skin:     General: Skin is warm and dry.   Neurological:      Mental Status: She is alert. Mental status is at baseline.      Motor: Tremor present.      Comments:  Lack of facial expression - Tremor    Myoclonic jerking at night   Psychiatric:         Attention and Perception: She is inattentive.         Behavior: Behavior is cooperative.         Cognition and Memory: Memory is impaired.      Comments: Rather flat affect. Soft-spoken. Some cognitive issues. Sometimes almost muted voice.  Difficult to follow and comprehend with a muted voice.           Assessment:       1. Other chest pain    2. Psychophysiological insomnia    3. Gastroesophageal reflux disease without esophagitis    4. Anxiety    5. Mixed hyperlipidemia    6. Dysthymia    7. RLS (restless legs syndrome)    8. Other fatigue    9. Low ferritin    10. Encounter for screening mammogram for malignant neoplasm of breast    11. Tremor           No visits with results within 3 Month(s) from this visit.   Latest known visit with results is:   Lab Visit on 04/26/2021   Component Date Value Ref Range Status    WBC 04/26/2021 6.96  3.90 - 12.70 K/uL Final    RBC 04/26/2021 4.41  4.00 - 5.40 M/uL Final    Hemoglobin 04/26/2021 12.5  12.0 - 16.0 g/dL Final    Hematocrit 04/26/2021 39.6  37.0 - 48.5 % Final    MCV 04/26/2021 90  82 - 98 fL Final    MCH 04/26/2021 28.3  27.0 - 31.0 pg Final    MCHC 04/26/2021 31.6 (A) 32.0 - 36.0 g/dL Final    RDW 04/26/2021 13.4  11.5 - 14.5 % Final    Platelets 04/26/2021 272  150 - 450 K/uL Final    MPV 04/26/2021 10.1  9.2 - 12.9 fL Final    Immature Granulocytes 04/26/2021 0.3  0.0 - 0.5 % Final    Gran # (ANC) 04/26/2021 3.7  1.8 - 7.7 K/uL Final    Immature Grans (Abs) 04/26/2021 0.02  0.00 - 0.04 K/uL Final    Lymph # 04/26/2021 1.8  1.0 - 4.8 K/uL Final    Mono # 04/26/2021 0.6  0.3 - 1.0 K/uL Final    Eos # 04/26/2021 0.8 (A) 0.0 - 0.5 K/uL Final    Baso # 04/26/2021 0.11  0.00 - 0.20 K/uL Final    nRBC 04/26/2021 0  0 /100 WBC Final    Gran % 04/26/2021 53.1  38.0 - 73.0 % Final    Lymph % 04/26/2021 25.1  18.0 - 48.0 % Final    Mono % 04/26/2021 8.8  4.0 -  15.0 % Final    Eosinophil % 04/26/2021 11.1 (A) 0.0 - 8.0 % Final    Basophil % 04/26/2021 1.6  0.0 - 1.9 % Final    Differential Method 04/26/2021 Automated   Final    Sodium 04/26/2021 141  136 - 145 mmol/L Final    Potassium 04/26/2021 4.4  3.5 - 5.1 mmol/L Final    Chloride 04/26/2021 108  95 - 110 mmol/L Final    CO2 04/26/2021 25  23 - 29 mmol/L Final    Glucose 04/26/2021 107  70 - 110 mg/dL Final    BUN 04/26/2021 13  8 - 23 mg/dL Final    Creatinine 04/26/2021 0.9  0.5 - 1.4 mg/dL Final    Calcium 04/26/2021 8.6 (A) 8.7 - 10.5 mg/dL Final    Total Protein 04/26/2021 6.6  6.0 - 8.4 g/dL Final    Albumin 04/26/2021 3.5  3.5 - 5.2 g/dL Final    Total Bilirubin 04/26/2021 0.7  0.1 - 1.0 mg/dL Final    Alkaline Phosphatase 04/26/2021 76  55 - 135 U/L Final    AST 04/26/2021 13  10 - 40 U/L Final    ALT 04/26/2021 11  10 - 44 U/L Final    Anion Gap 04/26/2021 8  8 - 16 mmol/L Final    eGFR if African American 04/26/2021 >60.0  >60 mL/min/1.73 m^2 Final    eGFR if non African American 04/26/2021 >60.0  >60 mL/min/1.73 m^2 Final    Sed Rate 04/26/2021 13  0 - 20 mm/Hr Final    Specimen UA 04/26/2021 Urine, Clean Catch   Final    Color, UA 04/26/2021 Yellow  Yellow, Straw, [place] Final    Appearance, UA 04/26/2021 Hazy (A) Clear Final    pH, UA 04/26/2021 6.0  5.0 - 8.0 Final    Specific Gravity, UA 04/26/2021 1.030  1.005 - 1.030 Final    Protein, UA 04/26/2021 1+ (A) Negative Final    Glucose, UA 04/26/2021 Negative  Negative Final    Ketones, UA 04/26/2021 Negative  Negative Final    Bilirubin (UA) 04/26/2021 Negative  Negative Final    Occult Blood UA 04/26/2021 Negative  Negative Final    Nitrite, UA 04/26/2021 Positive (A) Negative Final    Urobilinogen, UA 04/26/2021 Negative  Negative EU/dL Final    Leukocytes, UA 04/26/2021 3+ (A) Negative Final    RBC, UA 04/26/2021 5 (A) 0 - 4 /hpf Final    WBC, UA 04/26/2021 79 (A) 0 - 5 /hpf Final    Bacteria 04/26/2021 Few (A)  None-Occ /hpf Final    Squam Epithel, UA 04/26/2021 2  /hpf Final    Hyaline Casts, UA 04/26/2021 100 (A) 0-1/lpf /lpf Final    Microscopic Comment 04/26/2021 SEE COMMENT   Final         Plan:           Other chest pain  -     Ambulatory referral/consult to Cardiology; Future; Expected date: 05/17/2022  -     Comprehensive Metabolic Panel; Future; Expected date: 05/10/2022  -     CBC Auto Differential; Future; Expected date: 05/11/2022    Psychophysiological insomnia  -     traZODone (DESYREL) 100 MG tablet; Take 1 tablet (100 mg total) by mouth nightly.  Dispense: 30 tablet; Refill: 5    Gastroesophageal reflux disease without esophagitis    Anxiety  -     citalopram (CELEXA) 40 MG tablet; Take 1 tablet (40 mg total) by mouth once daily.  Dispense: 30 tablet; Refill: 5    Mixed hyperlipidemia  -     Lipid Panel; Future; Expected date: 05/11/2022    Dysthymia  -     citalopram (CELEXA) 40 MG tablet; Take 1 tablet (40 mg total) by mouth once daily.  Dispense: 30 tablet; Refill: 5    RLS (restless legs syndrome)  -     gabapentin (NEURONTIN) 300 MG capsule; Take 2 capsules (600 mg total) by mouth every evening.  Dispense: 180 capsule; Refill: 2    Other fatigue  -     Sedimentation rate; Future; Expected date: 05/10/2022  -     C-Reactive Protein; Future; Expected date: 05/10/2022  -     Ferritin; Future; Expected date: 05/10/2022    Low ferritin  -     Ferritin; Future; Expected date: 05/10/2022    Encounter for screening mammogram for malignant neoplasm of breast  -     Mammo Digital Screening Bilat; Future; Expected date: 06/10/2022    Tremor  Comments:  Mild tremor have been noted in the hands.  No particular diagnosis today.  Facial expressions are somewhat expressionless.    Patient's chronic multiple medical issues have been noted.  These are difficult to solve and manage with considerable psychosocial overlay.    Immediate problem seems to be chest pain but it looks like musculoskeletal pain.  However  patient is an extremely poor historian and will check with Cardiology for risk for coronary artery disease.  May need to be sick EKG and stress test.    Her symptoms of fatigue and tiredness continue with multiple medical issues including multiple medications which could cause of fatigue and tiredness.  Will check blood counts, chemistry, sed rate, CRP and ferritin level also.    She feels that increasing the dose of citalopram to 40 mg will help her and will do so.  This is the upper limit of normal.    I have advised her to consider getting some over-the-counter L Methyl Folate     Sleep continues to be an issue and I will increase trazodone to 100 mg at nighttime.    Restless leg symptoms are treated with ropinirole and will check ferritin levels also.    Mild tremors have been noted which are mostly on intention but look like pill rolling.  Some degree of facial expression less [place] has been noted.  Gait is somewhat staggered.  At this point it might be too premature to make a diagnosis of Parkinson's but will keep this in mind for future also.    Continue with COVID precautions.  Continue with injury and fall precautions.    Patient has multiple medical issues and divergent range of complains which makes it difficult to focus on couple of important issues at a time.  She immediately flips from 1 problem to another problem without even completing the issues and discussion on the first problem.  This makes treating her for extremely challenging.    Routine follow-up will be arranged in 3 months time or earlier as needed and based upon results and general situation.    Please go to the emergency room in case chest pain pronounced and sustained.    Current Outpatient Medications:     calcium carbonate-vitamin D3 (CALTRATE 600 + D) 600 mg (1,500 mg)-800 unit Chew, Take 1 tablet by mouth 2 (two) times daily., Disp: 100 tablet, Rfl: 5    cetirizine (ZYRTEC) 10 MG tablet, Take 1 tablet (10 mg total) by mouth once  daily., Disp: 90 tablet, Rfl: 0    ipratropium (ATROVENT) 0.03 % nasal spray, 2 sprays by Nasal route 2 (two) times daily., Disp: 30 mL, Rfl: 2    meloxicam (MOBIC) 15 MG tablet, TAKE 1 TABLET ONCE DAILY. USE SPARINGLY TO AVOID KIDNEY OR GI DAMAGE., Disp: 90 tablet, Rfl: 0    omeprazole (PRILOSEC) 20 MG capsule, TAKE 1 CAPSULE IN THE EVENING, Disp: 90 capsule, Rfl: 0    rOPINIRole (REQUIP) 4 MG tablet, Take 1 tablet (4 mg total) by mouth 2 (two) times daily., Disp: 180 tablet, Rfl: 2    citalopram (CELEXA) 40 MG tablet, Take 1 tablet (40 mg total) by mouth once daily., Disp: 30 tablet, Rfl: 5    gabapentin (NEURONTIN) 300 MG capsule, Take 2 capsules (600 mg total) by mouth every evening., Disp: 180 capsule, Rfl: 2    traZODone (DESYREL) 100 MG tablet, Take 1 tablet (100 mg total) by mouth nightly., Disp: 30 tablet, Rfl: 5    triamcinolone acetonide 0.5% (KENALOG) 0.5 % Crea, Apply topically 2 (two) times daily. for 10 days, Disp: 30 g, Rfl: 0

## 2022-05-10 ENCOUNTER — OFFICE VISIT (OUTPATIENT)
Dept: FAMILY MEDICINE | Facility: CLINIC | Age: 72
End: 2022-05-10
Payer: MEDICARE

## 2022-05-10 VITALS
HEART RATE: 74 BPM | HEIGHT: 60 IN | BODY MASS INDEX: 30.63 KG/M2 | SYSTOLIC BLOOD PRESSURE: 103 MMHG | DIASTOLIC BLOOD PRESSURE: 57 MMHG | WEIGHT: 156 LBS

## 2022-05-10 DIAGNOSIS — F41.9 ANXIETY: ICD-10-CM

## 2022-05-10 DIAGNOSIS — R07.89 OTHER CHEST PAIN: Primary | Chronic | ICD-10-CM

## 2022-05-10 DIAGNOSIS — R53.83 OTHER FATIGUE: ICD-10-CM

## 2022-05-10 DIAGNOSIS — F51.04 PSYCHOPHYSIOLOGICAL INSOMNIA: ICD-10-CM

## 2022-05-10 DIAGNOSIS — G25.81 RLS (RESTLESS LEGS SYNDROME): ICD-10-CM

## 2022-05-10 DIAGNOSIS — R79.0 LOW FERRITIN: ICD-10-CM

## 2022-05-10 DIAGNOSIS — Z12.31 ENCOUNTER FOR SCREENING MAMMOGRAM FOR MALIGNANT NEOPLASM OF BREAST: ICD-10-CM

## 2022-05-10 DIAGNOSIS — F34.1 DYSTHYMIA: ICD-10-CM

## 2022-05-10 DIAGNOSIS — R25.1 TREMOR: ICD-10-CM

## 2022-05-10 DIAGNOSIS — E78.2 MIXED HYPERLIPIDEMIA: ICD-10-CM

## 2022-05-10 DIAGNOSIS — K21.9 GASTROESOPHAGEAL REFLUX DISEASE WITHOUT ESOPHAGITIS: ICD-10-CM

## 2022-05-10 PROCEDURE — 3008F PR BODY MASS INDEX (BMI) DOCUMENTED: ICD-10-PCS | Mod: CPTII,S$GLB,, | Performed by: INTERNAL MEDICINE

## 2022-05-10 PROCEDURE — 3078F DIAST BP <80 MM HG: CPT | Mod: CPTII,S$GLB,, | Performed by: INTERNAL MEDICINE

## 2022-05-10 PROCEDURE — 1126F AMNT PAIN NOTED NONE PRSNT: CPT | Mod: CPTII,S$GLB,, | Performed by: INTERNAL MEDICINE

## 2022-05-10 PROCEDURE — 3008F BODY MASS INDEX DOCD: CPT | Mod: CPTII,S$GLB,, | Performed by: INTERNAL MEDICINE

## 2022-05-10 PROCEDURE — 3074F SYST BP LT 130 MM HG: CPT | Mod: CPTII,S$GLB,, | Performed by: INTERNAL MEDICINE

## 2022-05-10 PROCEDURE — 1160F RVW MEDS BY RX/DR IN RCRD: CPT | Mod: CPTII,S$GLB,, | Performed by: INTERNAL MEDICINE

## 2022-05-10 PROCEDURE — 1101F PT FALLS ASSESS-DOCD LE1/YR: CPT | Mod: CPTII,S$GLB,, | Performed by: INTERNAL MEDICINE

## 2022-05-10 PROCEDURE — 99214 PR OFFICE/OUTPT VISIT, EST, LEVL IV, 30-39 MIN: ICD-10-PCS | Mod: S$GLB,,, | Performed by: INTERNAL MEDICINE

## 2022-05-10 PROCEDURE — 1101F PR PT FALLS ASSESS DOC 0-1 FALLS W/OUT INJ PAST YR: ICD-10-PCS | Mod: CPTII,S$GLB,, | Performed by: INTERNAL MEDICINE

## 2022-05-10 PROCEDURE — 3288F FALL RISK ASSESSMENT DOCD: CPT | Mod: CPTII,S$GLB,, | Performed by: INTERNAL MEDICINE

## 2022-05-10 PROCEDURE — 3074F PR MOST RECENT SYSTOLIC BLOOD PRESSURE < 130 MM HG: ICD-10-PCS | Mod: CPTII,S$GLB,, | Performed by: INTERNAL MEDICINE

## 2022-05-10 PROCEDURE — 99214 OFFICE O/P EST MOD 30 MIN: CPT | Mod: S$GLB,,, | Performed by: INTERNAL MEDICINE

## 2022-05-10 PROCEDURE — 3288F PR FALLS RISK ASSESSMENT DOCUMENTED: ICD-10-PCS | Mod: CPTII,S$GLB,, | Performed by: INTERNAL MEDICINE

## 2022-05-10 PROCEDURE — 3078F PR MOST RECENT DIASTOLIC BLOOD PRESSURE < 80 MM HG: ICD-10-PCS | Mod: CPTII,S$GLB,, | Performed by: INTERNAL MEDICINE

## 2022-05-10 PROCEDURE — 1160F PR REVIEW ALL MEDS BY PRESCRIBER/CLIN PHARMACIST DOCUMENTED: ICD-10-PCS | Mod: CPTII,S$GLB,, | Performed by: INTERNAL MEDICINE

## 2022-05-10 PROCEDURE — 1126F PR PAIN SEVERITY QUANTIFIED, NO PAIN PRESENT: ICD-10-PCS | Mod: CPTII,S$GLB,, | Performed by: INTERNAL MEDICINE

## 2022-05-10 PROCEDURE — 1159F MED LIST DOCD IN RCRD: CPT | Mod: CPTII,S$GLB,, | Performed by: INTERNAL MEDICINE

## 2022-05-10 PROCEDURE — 1159F PR MEDICATION LIST DOCUMENTED IN MEDICAL RECORD: ICD-10-PCS | Mod: CPTII,S$GLB,, | Performed by: INTERNAL MEDICINE

## 2022-05-10 RX ORDER — GABAPENTIN 300 MG/1
600 CAPSULE ORAL NIGHTLY
Qty: 180 CAPSULE | Refills: 2 | Status: SHIPPED | OUTPATIENT
Start: 2022-05-10 | End: 2022-08-24

## 2022-05-10 RX ORDER — CITALOPRAM 40 MG/1
40 TABLET, FILM COATED ORAL DAILY
Qty: 30 TABLET | Refills: 5 | Status: SHIPPED | OUTPATIENT
Start: 2022-05-10 | End: 2022-07-21 | Stop reason: SDUPTHER

## 2022-05-10 RX ORDER — TRAZODONE HYDROCHLORIDE 100 MG/1
100 TABLET ORAL NIGHTLY
Qty: 30 TABLET | Refills: 5 | Status: SHIPPED | OUTPATIENT
Start: 2022-05-10 | End: 2022-07-21 | Stop reason: SDUPTHER

## 2022-05-11 ENCOUNTER — TELEPHONE (OUTPATIENT)
Dept: FAMILY MEDICINE | Facility: CLINIC | Age: 72
End: 2022-05-11
Payer: MEDICARE

## 2022-05-13 ENCOUNTER — HOSPITAL ENCOUNTER (OUTPATIENT)
Dept: RADIOLOGY | Facility: HOSPITAL | Age: 72
Discharge: HOME | End: 2022-05-13
Attending: INTERNAL MEDICINE
Payer: MEDICARE

## 2022-05-13 VITALS — WEIGHT: 156.06 LBS | BODY MASS INDEX: 30.64 KG/M2 | HEIGHT: 60 IN

## 2022-05-13 DIAGNOSIS — Z12.31 ENCOUNTER FOR SCREENING MAMMOGRAM FOR MALIGNANT NEOPLASM OF BREAST: ICD-10-CM

## 2022-05-13 PROCEDURE — 77063 BREAST TOMOSYNTHESIS BI: CPT | Mod: TC,PO

## 2022-05-13 PROCEDURE — 77067 SCR MAMMO BI INCL CAD: CPT | Mod: TC,PO

## 2022-07-14 DIAGNOSIS — G25.81 RLS (RESTLESS LEGS SYNDROME): ICD-10-CM

## 2022-07-21 DIAGNOSIS — F51.04 PSYCHOPHYSIOLOGICAL INSOMNIA: ICD-10-CM

## 2022-07-21 DIAGNOSIS — F41.9 ANXIETY: ICD-10-CM

## 2022-07-21 DIAGNOSIS — F34.1 DYSTHYMIA: ICD-10-CM

## 2022-07-22 RX ORDER — ROPINIROLE 4 MG/1
TABLET, FILM COATED ORAL
Qty: 60 TABLET | Refills: 0 | Status: SHIPPED | OUTPATIENT
Start: 2022-07-22 | End: 2022-09-12

## 2022-07-22 RX ORDER — TRAZODONE HYDROCHLORIDE 100 MG/1
100 TABLET ORAL NIGHTLY
Qty: 30 TABLET | Refills: 0 | Status: SHIPPED | OUTPATIENT
Start: 2022-07-22 | End: 2022-07-26 | Stop reason: SDUPTHER

## 2022-07-22 RX ORDER — CITALOPRAM 40 MG/1
40 TABLET, FILM COATED ORAL DAILY
Qty: 30 TABLET | Refills: 0 | Status: SHIPPED | OUTPATIENT
Start: 2022-07-22 | End: 2022-07-26 | Stop reason: SDUPTHER

## 2022-07-26 DIAGNOSIS — F41.9 ANXIETY: ICD-10-CM

## 2022-07-26 DIAGNOSIS — F51.04 PSYCHOPHYSIOLOGICAL INSOMNIA: ICD-10-CM

## 2022-07-26 DIAGNOSIS — F34.1 DYSTHYMIA: ICD-10-CM

## 2022-07-27 RX ORDER — TRAZODONE HYDROCHLORIDE 100 MG/1
100 TABLET ORAL NIGHTLY
Qty: 30 TABLET | Refills: 0 | Status: SHIPPED | OUTPATIENT
Start: 2022-07-27 | End: 2022-08-14

## 2022-07-27 RX ORDER — CITALOPRAM 40 MG/1
40 TABLET, FILM COATED ORAL DAILY
Qty: 30 TABLET | Refills: 0 | Status: SHIPPED | OUTPATIENT
Start: 2022-07-27 | End: 2022-08-14

## 2022-08-15 ENCOUNTER — TELEPHONE (OUTPATIENT)
Dept: FAMILY MEDICINE | Facility: CLINIC | Age: 72
End: 2022-08-15

## 2022-08-15 NOTE — TELEPHONE ENCOUNTER
Called patient regarding appt tomorrow being moved to 1pm slot. Patient not accepting calls at this time. Unable to leave message. Portal message sent.

## 2022-08-24 ENCOUNTER — OFFICE VISIT (OUTPATIENT)
Dept: FAMILY MEDICINE | Facility: CLINIC | Age: 72
End: 2022-08-24
Payer: MEDICARE

## 2022-08-24 VITALS
OXYGEN SATURATION: 97 % | RESPIRATION RATE: 18 BRPM | SYSTOLIC BLOOD PRESSURE: 118 MMHG | HEART RATE: 74 BPM | BODY MASS INDEX: 29.55 KG/M2 | DIASTOLIC BLOOD PRESSURE: 72 MMHG | HEIGHT: 60 IN | WEIGHT: 150.5 LBS | TEMPERATURE: 99 F

## 2022-08-24 DIAGNOSIS — R07.89 OTHER CHEST PAIN: Primary | ICD-10-CM

## 2022-08-24 DIAGNOSIS — R53.83 FATIGUE, UNSPECIFIED TYPE: ICD-10-CM

## 2022-08-24 DIAGNOSIS — R42 DIZZINESS: ICD-10-CM

## 2022-08-24 DIAGNOSIS — G25.81 RLS (RESTLESS LEGS SYNDROME): ICD-10-CM

## 2022-08-24 DIAGNOSIS — R25.1 TREMOR: ICD-10-CM

## 2022-08-24 DIAGNOSIS — G47.19 EXCESSIVE DAYTIME SLEEPINESS: ICD-10-CM

## 2022-08-24 DIAGNOSIS — E78.2 MIXED HYPERLIPIDEMIA: ICD-10-CM

## 2022-08-24 PROCEDURE — 1101F PT FALLS ASSESS-DOCD LE1/YR: CPT | Mod: CPTII,S$GLB,, | Performed by: NURSE PRACTITIONER

## 2022-08-24 PROCEDURE — 1160F RVW MEDS BY RX/DR IN RCRD: CPT | Mod: CPTII,S$GLB,, | Performed by: NURSE PRACTITIONER

## 2022-08-24 PROCEDURE — 1159F MED LIST DOCD IN RCRD: CPT | Mod: CPTII,S$GLB,, | Performed by: NURSE PRACTITIONER

## 2022-08-24 PROCEDURE — 99214 PR OFFICE/OUTPT VISIT, EST, LEVL IV, 30-39 MIN: ICD-10-PCS | Mod: S$GLB,,, | Performed by: NURSE PRACTITIONER

## 2022-08-24 PROCEDURE — 1125F PR PAIN SEVERITY QUANTIFIED, PAIN PRESENT: ICD-10-PCS | Mod: CPTII,S$GLB,, | Performed by: NURSE PRACTITIONER

## 2022-08-24 PROCEDURE — 3288F FALL RISK ASSESSMENT DOCD: CPT | Mod: CPTII,S$GLB,, | Performed by: NURSE PRACTITIONER

## 2022-08-24 PROCEDURE — 3288F PR FALLS RISK ASSESSMENT DOCUMENTED: ICD-10-PCS | Mod: CPTII,S$GLB,, | Performed by: NURSE PRACTITIONER

## 2022-08-24 PROCEDURE — 3008F BODY MASS INDEX DOCD: CPT | Mod: CPTII,S$GLB,, | Performed by: NURSE PRACTITIONER

## 2022-08-24 PROCEDURE — 3074F PR MOST RECENT SYSTOLIC BLOOD PRESSURE < 130 MM HG: ICD-10-PCS | Mod: CPTII,S$GLB,, | Performed by: NURSE PRACTITIONER

## 2022-08-24 PROCEDURE — 3078F PR MOST RECENT DIASTOLIC BLOOD PRESSURE < 80 MM HG: ICD-10-PCS | Mod: CPTII,S$GLB,, | Performed by: NURSE PRACTITIONER

## 2022-08-24 PROCEDURE — 3078F DIAST BP <80 MM HG: CPT | Mod: CPTII,S$GLB,, | Performed by: NURSE PRACTITIONER

## 2022-08-24 PROCEDURE — 99214 OFFICE O/P EST MOD 30 MIN: CPT | Mod: S$GLB,,, | Performed by: NURSE PRACTITIONER

## 2022-08-24 PROCEDURE — 1159F PR MEDICATION LIST DOCUMENTED IN MEDICAL RECORD: ICD-10-PCS | Mod: CPTII,S$GLB,, | Performed by: NURSE PRACTITIONER

## 2022-08-24 PROCEDURE — 1125F AMNT PAIN NOTED PAIN PRSNT: CPT | Mod: CPTII,S$GLB,, | Performed by: NURSE PRACTITIONER

## 2022-08-24 PROCEDURE — 1101F PR PT FALLS ASSESS DOC 0-1 FALLS W/OUT INJ PAST YR: ICD-10-PCS | Mod: CPTII,S$GLB,, | Performed by: NURSE PRACTITIONER

## 2022-08-24 PROCEDURE — 3074F SYST BP LT 130 MM HG: CPT | Mod: CPTII,S$GLB,, | Performed by: NURSE PRACTITIONER

## 2022-08-24 PROCEDURE — 1160F PR REVIEW ALL MEDS BY PRESCRIBER/CLIN PHARMACIST DOCUMENTED: ICD-10-PCS | Mod: CPTII,S$GLB,, | Performed by: NURSE PRACTITIONER

## 2022-08-24 PROCEDURE — 3008F PR BODY MASS INDEX (BMI) DOCUMENTED: ICD-10-PCS | Mod: CPTII,S$GLB,, | Performed by: NURSE PRACTITIONER

## 2022-08-24 NOTE — PROGRESS NOTES
SUBJECTIVE:      Patient ID: Vesta Cousin is a 71 y.o. female.    Chief Complaint: Fatigue    Vesta is a new patient to me- switching care from Dr. Colon.  She is here for complaints of ongoing fatigue over the last several months.  Fatigue seems to worsen when she is not sleeping well which is nearly every night.  She is taking trazodone nightly for insomnia and states she would fall asleep easily at bedtime and wakes up every night around 3:00 a.m..  Many nights she is unable to fall back asleep and therefore is off all day.  She also wakes up frequently if she does not take the medication.  Her partner has sleeping issues and she cannot tell me if she has any apnea signs or symptoms.  She is unaware if she snores.  She has had blood work completed by her previous physician which was reviewed today.  At last visit, he did refer her to Cardiology for concerns about her fatigue with exertion and mild shortness of breath.  Patient has not seen the cardiology specialist see [facility] and was given the number to the clinic today.  She does have a history of restless leg syndrome and chronic back pain and is currently taking gabapentin and Requip.  Patient recently was increased to 600 mg of gabapentin at bedtime and I did discuss with her that this could increase her fatigue symptoms.  Patient reports she did run out of the medication has been off of it for several days.  She is still taking her RLS medication at nighttime which significantly reduces her symptoms and allows her to sleep.  She does have tremors while she is moving and resting at home.  It was also recommended to her that this be monitored and possibly referred to Neurology for further evaluation.  Patient is now complaining of intermittent dizziness especially with changing position and is asking for Neurology evaluation at this time.  She denies any current chest pain, shortness of breath, recent illness, changes in her bowel habits, changes in her  appetite, night sweats, or other concerns at this time.  She is stable on her other medications and denies any side effects to report as of now.    Fatigue  This is a chronic problem. The current episode started more than 1 month ago (approx 3 mo ). The problem occurs daily. The problem has been gradually worsening. Associated symptoms include chest pain (hx of - none currently ), fatigue, myalgias and vertigo. Pertinent negatives include no abdominal pain, anorexia, arthralgias, change in bowel habit, chills, congestion, coughing, diaphoresis, fever, headaches, joint swelling, nausea, neck pain, numbness, rash, sore throat, swollen glands, urinary symptoms, visual change, vomiting or weakness. The symptoms are aggravated by exertion and walking. She has tried rest for the symptoms. The treatment provided moderate relief.       Family History   Problem Relation Age of Onset    Stroke Mother     Arthritis Father     Heart disease Father 73    Heart disease Brother       Social History     Socioeconomic History    Marital status:     Number of children: 2   Tobacco Use    Smoking status: Never Smoker    Smokeless tobacco: Never Used   Substance and Sexual Activity    Alcohol use: Yes     Comment: socially. luís    Drug use: No    Sexual activity: Yes     Partners: Male   Social History Narrative        2 sons    Works part time at [employer]     Current Outpatient Medications   Medication Sig Dispense Refill    calcium carbonate-vitamin D3 (CALTRATE 600 + D) 600 mg (1,500 mg)-800 unit Chew Take 1 tablet by mouth 2 (two) times daily. 100 tablet 5    citalopram (CELEXA) 40 MG tablet TAKE 1 TABLET ONE TIME DAILY (DOSE INCREASE) 30 tablet 0    ipratropium (ATROVENT) 0.03 % nasal spray 2 sprays by Nasal route 2 (two) times daily. 30 mL 2    meloxicam (MOBIC) 15 MG tablet TAKE 1 TABLET ONCE DAILY. USE SPARINGLY TO AVOID KIDNEY OR GI DAMAGE. 90 tablet 0    omeprazole (PRILOSEC) 20  MG capsule TAKE 1 CAPSULE IN THE EVENING 90 capsule 0    rOPINIRole (REQUIP) 4 MG tablet TAKE 1 TABLET TWICE DAILY 60 tablet 0    traZODone (DESYREL) 100 MG tablet TAKE 1 TABLET (100 MG TOTAL) BY MOUTH NIGHTLY. (DOSE INCREASE) 30 tablet 0     No current facility-administered medications for this visit.     Review of patient's allergies indicates:   Allergen Reactions    Hay fever and allergy relief     Gabapentin Other (See Comments)      Avoid this medication since patient is unaware of this medication and it may be causing some memory issues and confusion.      Past Medical History:   Diagnosis Date    Abdominal pain, acute     Abdominal wall mass of epigastric region     Acid reflux     Acute bronchitis 10/17/2018    Acute low back pain     Annual physical exam     Anxiety     Bilateral shoulder pain     Body mass index (bmi) 31.0-31.9, adult     Cervical spondylosis     Chronic right shoulder pain     Decreased GFR     Depression     Fall 6/11/2019    Head trauma 6/11/2019    Hearing aid worn     bilateral    History of ulcerative colitis     History of vitamin D deficiency 4/24/2018    HNP (herniated nucleus pulposus), lumbar     Hyperlipidemia     Insomnia     Kidney stone     Left-sided low back pain without sciatica     Non-smoker     Osteoarthritis     Osteoarthritis of left glenohumeral joint     Osteopenia     Osteoporosis screening     Palpable abdominal mass     Peripheral neuropathy     Plantar fasciitis of left foot     RLS (restless legs syndrome)     Screening for colorectal cancer     Tear of right rotator cuff     Visit for screening mammogram      Past Surgical History:   Procedure Laterality Date    CHOLECYSTECTOMY      COLON SURGERY      HEEL SPUR SURGERY      JOINT REPLACEMENT Bilateral     hip    partial colectomy      for ulcerative colitis    SHOULDER SURGERY  05/2016    TONSILLECTOMY      total right hip replacement         Review of Systems    Constitutional: Positive for fatigue. Negative for activity change, appetite change, chills, diaphoresis, fever and unexpected weight change.   HENT: Positive for postnasal drip, sneezing and voice change. Negative for congestion, ear discharge, ear pain, rhinorrhea, sinus pressure, sinus pain, sore throat and trouble swallowing.    Eyes: Negative for visual disturbance.   Respiratory: Positive for shortness of breath (occasional ). Negative for apnea, cough, choking and wheezing.    Cardiovascular: Positive for chest pain (hx of - none currently ). Negative for palpitations and leg swelling.   Gastrointestinal: Negative for abdominal pain, anorexia, blood in stool, change in bowel habit, constipation, diarrhea, nausea and vomiting.        GERD- controlled on PPI    Endocrine: Negative for cold intolerance, heat intolerance, polydipsia and polyuria.   Genitourinary: Negative for dysuria, flank pain, frequency, hematuria and vaginal discharge.   Musculoskeletal: Positive for myalgias. Negative for arthralgias, joint swelling and neck pain.        RLS at nighttime   Skin: Negative for pallor and rash.   Allergic/Immunologic: Positive for environmental allergies.   Neurological: Positive for dizziness, vertigo and tremors. Negative for syncope, facial asymmetry, speech difficulty, weakness, light-headedness, numbness and headaches.   Hematological: Negative for adenopathy. Does not bruise/bleed easily.   Psychiatric/Behavioral: Positive for dysphoric mood and sleep disturbance. Negative for agitation, confusion and suicidal ideas. The patient is not nervous/anxious.       OBJECTIVE:      Vitals:    08/24/22 0943   BP: 118/72   BP Location: Left arm   Patient Position: Sitting   BP Method: Medium (Manual)   Pulse: 74   Resp: 18   Temp: 98.7 °F (37.1 °C)   TempSrc: Oral   SpO2: 97%   Weight: 68.3 kg (150 lb 8 oz)   Height: 5' (1.524 m)     Physical Exam  Vitals and nursing note reviewed.   Constitutional:        General: She is not in acute distress.     Appearance: Normal appearance. She is well-developed. She is not ill-appearing or diaphoretic.      Comments: Overweight    HENT:      Head: Normocephalic and atraumatic.      Right Ear: Tympanic membrane, ear canal and external ear normal.      Left Ear: Tympanic membrane, ear canal and external ear normal.      Nose: Nose normal.      Mouth/Throat:      Mouth: Mucous membranes are moist.      Pharynx: No oropharyngeal exudate.   Eyes:      General: Lids are normal. Lids are everted, no foreign bodies appreciated. No scleral icterus.     Conjunctiva/sclera: Conjunctivae normal.      Pupils: Pupils are equal, round, and reactive to light.   Neck:      Thyroid: No thyroid mass, thyromegaly or thyroid tenderness.      Trachea: Trachea normal.   Cardiovascular:      Rate and Rhythm: Normal rate and regular rhythm.      Heart sounds: Normal heart sounds, S1 normal and S2 normal. No murmur heard.  Pulmonary:      Effort: No respiratory distress.      Breath sounds: Normal breath sounds. No rhonchi.   Chest:       Abdominal:      General: Bowel sounds are normal. There is no distension.      Palpations: Abdomen is soft. Abdomen is not rigid.      Tenderness: There is no abdominal tenderness.   Musculoskeletal:      Cervical back: Normal range of motion and neck supple.      Lumbar back: No swelling or tenderness. Normal range of motion.      Right lower leg: No edema.      Left lower leg: No edema.   Lymphadenopathy:      Cervical: No cervical adenopathy.   Skin:     General: Skin is warm and dry.      Coloration: Skin is not jaundiced or pale.   Neurological:      Mental Status: She is alert and oriented to person, place, and time. Mental status is at baseline.      Motor: Tremor present.      Gait: Gait normal.      Comments: Action Tremors; pt reports resting as well - none observed   Psychiatric:         Attention and Perception: She is inattentive.         Mood and Affect:  Mood normal.         Behavior: Behavior normal. Behavior is cooperative.         Cognition and Memory: Memory is impaired.      Comments: Rather flat affect. Soft-spoken. Sometimes almost muted voice.         Assessment:       1. Other chest pain    2. Fatigue, unspecified type    3. RLS (restless legs syndrome)    4. Mixed hyperlipidemia    5. Dizziness    6. Tremor    7. Excessive daytime sleepiness        Plan:       Other chest pain    Fatigue, unspecified type  -     Home Sleep Studies; Future  -off Gabapentin for now     RLS (restless legs syndrome)  -     Ambulatory referral/consult to Neurology; Future; Expected date: 08/31/2022    Mixed hyperlipidemia   -increase fiber, water and lifestyle changes reviewed    Dizziness  -     Ambulatory referral/consult to Neurology; Future; Expected date: 08/31/2022  -could be vertigo, but wants evaluation due to tremors     Tremor  -     Ambulatory referral/consult to Neurology; Future; Expected date: 08/31/2022    Excessive daytime sleepiness  -     Home Sleep Studies; Future   -R/o VIKI       Follow up in about 1 month (around 9/24/2022) for f/u tests .      8/24/2022 MIKAL Mckeon, FNP    This note was created using [employer] voice recognition software that occasionally misinterprets phrases or words.       Answers for HPI/ROS submitted by the patient on 8/22/2022  genital pain: No

## 2022-10-05 LAB — HEMOCCULT STL QL IA: POSITIVE

## 2022-11-23 ENCOUNTER — OFFICE VISIT (OUTPATIENT)
Dept: FAMILY MEDICINE | Facility: CLINIC | Age: 72
End: 2022-11-23
Payer: MEDICARE

## 2022-11-23 VITALS
RESPIRATION RATE: 18 BRPM | SYSTOLIC BLOOD PRESSURE: 120 MMHG | HEIGHT: 60 IN | TEMPERATURE: 99 F | DIASTOLIC BLOOD PRESSURE: 72 MMHG | WEIGHT: 147.69 LBS | OXYGEN SATURATION: 97 % | BODY MASS INDEX: 28.99 KG/M2 | HEART RATE: 74 BPM

## 2022-11-23 DIAGNOSIS — Z23 NEED FOR INFLUENZA VACCINATION: ICD-10-CM

## 2022-11-23 DIAGNOSIS — Z23 NEED FOR PNEUMOCOCCAL VACCINATION: Primary | ICD-10-CM

## 2022-11-23 DIAGNOSIS — R19.5 POSITIVE FIT (FECAL IMMUNOCHEMICAL TEST): ICD-10-CM

## 2022-11-23 PROBLEM — Z11.59 NEED FOR HEPATITIS C SCREENING TEST: Status: RESOLVED | Noted: 2018-04-24 | Resolved: 2022-11-23

## 2022-11-23 PROBLEM — Z12.39 SCREENING FOR BREAST CANCER: Status: RESOLVED | Noted: 2019-06-11 | Resolved: 2022-11-23

## 2022-11-23 PROCEDURE — 90662 IIV NO PRSV INCREASED AG IM: CPT | Mod: S$GLB,,, | Performed by: NURSE PRACTITIONER

## 2022-11-23 PROCEDURE — 90662 FLU VACCINE - QUADRIVALENT - HIGH DOSE (65+) PRESERVATIVE FREE IM: ICD-10-PCS | Mod: S$GLB,,, | Performed by: NURSE PRACTITIONER

## 2022-11-23 PROCEDURE — 3288F PR FALLS RISK ASSESSMENT DOCUMENTED: ICD-10-PCS | Mod: CPTII,S$GLB,, | Performed by: NURSE PRACTITIONER

## 2022-11-23 PROCEDURE — 1126F AMNT PAIN NOTED NONE PRSNT: CPT | Mod: CPTII,S$GLB,, | Performed by: NURSE PRACTITIONER

## 2022-11-23 PROCEDURE — 1101F PR PT FALLS ASSESS DOC 0-1 FALLS W/OUT INJ PAST YR: ICD-10-PCS | Mod: CPTII,S$GLB,, | Performed by: NURSE PRACTITIONER

## 2022-11-23 PROCEDURE — 3008F BODY MASS INDEX DOCD: CPT | Mod: CPTII,S$GLB,, | Performed by: NURSE PRACTITIONER

## 2022-11-23 PROCEDURE — 3008F PR BODY MASS INDEX (BMI) DOCUMENTED: ICD-10-PCS | Mod: CPTII,S$GLB,, | Performed by: NURSE PRACTITIONER

## 2022-11-23 PROCEDURE — G0008 FLU VACCINE - QUADRIVALENT - HIGH DOSE (65+) PRESERVATIVE FREE IM: ICD-10-PCS | Mod: S$GLB,,, | Performed by: NURSE PRACTITIONER

## 2022-11-23 PROCEDURE — 1160F PR REVIEW ALL MEDS BY PRESCRIBER/CLIN PHARMACIST DOCUMENTED: ICD-10-PCS | Mod: CPTII,S$GLB,, | Performed by: NURSE PRACTITIONER

## 2022-11-23 PROCEDURE — 1160F RVW MEDS BY RX/DR IN RCRD: CPT | Mod: CPTII,S$GLB,, | Performed by: NURSE PRACTITIONER

## 2022-11-23 PROCEDURE — 90677 PNEUMOCOCCAL CONJUGATE VACCINE 20-VALENT: ICD-10-PCS | Mod: S$GLB,,, | Performed by: NURSE PRACTITIONER

## 2022-11-23 PROCEDURE — G0008 ADMIN INFLUENZA VIRUS VAC: HCPCS | Mod: S$GLB,,, | Performed by: NURSE PRACTITIONER

## 2022-11-23 PROCEDURE — 1159F PR MEDICATION LIST DOCUMENTED IN MEDICAL RECORD: ICD-10-PCS | Mod: CPTII,S$GLB,, | Performed by: NURSE PRACTITIONER

## 2022-11-23 PROCEDURE — 1159F MED LIST DOCD IN RCRD: CPT | Mod: CPTII,S$GLB,, | Performed by: NURSE PRACTITIONER

## 2022-11-23 PROCEDURE — 1126F PR PAIN SEVERITY QUANTIFIED, NO PAIN PRESENT: ICD-10-PCS | Mod: CPTII,S$GLB,, | Performed by: NURSE PRACTITIONER

## 2022-11-23 PROCEDURE — 99213 PR OFFICE/OUTPT VISIT, EST, LEVL III, 20-29 MIN: ICD-10-PCS | Mod: 25,S$GLB,, | Performed by: NURSE PRACTITIONER

## 2022-11-23 PROCEDURE — G0009 ADMIN PNEUMOCOCCAL VACCINE: HCPCS | Mod: S$GLB,,, | Performed by: NURSE PRACTITIONER

## 2022-11-23 PROCEDURE — 1101F PT FALLS ASSESS-DOCD LE1/YR: CPT | Mod: CPTII,S$GLB,, | Performed by: NURSE PRACTITIONER

## 2022-11-23 PROCEDURE — 3288F FALL RISK ASSESSMENT DOCD: CPT | Mod: CPTII,S$GLB,, | Performed by: NURSE PRACTITIONER

## 2022-11-23 PROCEDURE — G0009 PNEUMOCOCCAL CONJUGATE VACCINE 20-VALENT: ICD-10-PCS | Mod: S$GLB,,, | Performed by: NURSE PRACTITIONER

## 2022-11-23 PROCEDURE — 90677 PCV20 VACCINE IM: CPT | Mod: S$GLB,,, | Performed by: NURSE PRACTITIONER

## 2022-11-23 PROCEDURE — 99213 OFFICE O/P EST LOW 20 MIN: CPT | Mod: 25,S$GLB,, | Performed by: NURSE PRACTITIONER

## 2022-11-23 NOTE — PROGRESS NOTES
"    SUBJECTIVE:      Patient ID: Vesta Cousin is a 72 y.o. female.    Chief Complaint: Results    Vesta is here for follow up on positive home FIT test ordered by her insurance. Needs referral to GI- last colonoscopy date unknown. Has a hx of UC- had a "lot" of her colon removed years ago at [facility] (no records to review). Denies changes in bowel habits, dark or bloody stools or pain with defecation. Feeling well since last visit, taking meds as prescribed without SE or complaints. Has not seen neurology or gotten home sleep study done due to cost of co-pays.     Requesting flu and pneumonia vaccines       Family History   Problem Relation Age of Onset    Stroke Mother     Arthritis Father     Heart disease Father 73    Heart disease Brother       Social History     Socioeconomic History    Marital status:     Number of children: 2   Tobacco Use    Smoking status: Never    Smokeless tobacco: Never   Substance and Sexual Activity    Alcohol use: Yes     Comment: socially. luís    Drug use: No    Sexual activity: Yes     Partners: Male   Social History Narrative        2 sons    Works part time at department store     Current Outpatient Medications   Medication Sig Dispense Refill    calcium carbonate-vitamin D3 (CALTRATE 600 + D) 600 mg (1,500 mg)-800 unit Chew Take 1 tablet by mouth 2 (two) times daily. 100 tablet 5    citalopram (CELEXA) 40 MG tablet TAKE 1 TABLET EVERY DAY 90 tablet 0    ipratropium (ATROVENT) 0.03 % nasal spray 2 sprays by Nasal route 2 (two) times daily. 30 mL 2    meloxicam (MOBIC) 15 MG tablet TAKE 1 TABLET ONCE DAILY. USE SPARINGLY TO AVOID KIDNEY OR GI DAMAGE. 90 tablet 0    omeprazole (PRILOSEC) 20 MG capsule TAKE 1 CAPSULE IN THE EVENING 90 capsule 0    rOPINIRole (REQUIP) 4 MG tablet TAKE 1 TABLET TWICE DAILY (APPOINTMENT IS NEEDED FOR REFILLS) 120 tablet 0    traZODone (DESYREL) 100 MG tablet TAKE 1 TABLET EVERY NIGHT 90 tablet 0     No current " facility-administered medications for this visit.     Review of patient's allergies indicates:   Allergen Reactions    Hay fever and allergy relief     Gabapentin Other (See Comments)      Avoid this medication since patient is unaware of this medication and it may be causing some memory issues and confusion.      Past Medical History:   Diagnosis Date    Abdominal pain, acute     Abdominal wall mass of epigastric region     Acid reflux     Acute bronchitis 10/17/2018    Acute low back pain     Annual physical exam     Anxiety     Bilateral shoulder pain     Body mass index (bmi) 31.0-31.9, adult     Cervical spondylosis     Chronic right shoulder pain     Decreased GFR     Depression     Fall 6/11/2019    Head trauma 6/11/2019    Hearing aid worn     bilateral    History of ulcerative colitis     History of vitamin D deficiency 4/24/2018    HNP (herniated nucleus pulposus), lumbar     Hyperlipidemia     Insomnia     Kidney stone     Left-sided low back pain without sciatica     Non-smoker     Osteoarthritis     Osteoarthritis of left glenohumeral joint     Osteopenia     Osteoporosis screening     Palpable abdominal mass     Peripheral neuropathy     Plantar fasciitis of left foot     RLS (restless legs syndrome)     Screening for colorectal cancer     Tear of right rotator cuff     Visit for screening mammogram      Past Surgical History:   Procedure Laterality Date    CHOLECYSTECTOMY      COLON SURGERY      HEEL SPUR SURGERY      JOINT REPLACEMENT Bilateral     hip    partial colectomy      for ulcerative colitis    SHOULDER SURGERY  05/2016    TONSILLECTOMY      total right hip replacement         Review of Systems   Constitutional:  Negative for activity change, chills, fever and unexpected weight change.   HENT:  Positive for hearing loss. Negative for rhinorrhea and trouble swallowing.    Eyes:  Negative for discharge and visual disturbance.   Respiratory:  Negative for chest tightness, shortness of breath  and wheezing.    Cardiovascular:  Negative for chest pain and palpitations.   Gastrointestinal:  Negative for abdominal distention, abdominal pain, anal bleeding, blood in stool, constipation, diarrhea, nausea, rectal pain and vomiting.   Endocrine: Negative for polydipsia and polyuria.   Genitourinary:  Negative for difficulty urinating, dysuria, frequency, hematuria and menstrual problem.   Musculoskeletal:  Negative for arthralgias, back pain, joint swelling and neck pain.   Skin:  Negative for pallor.   Neurological:  Negative for dizziness, weakness and headaches.   Hematological:  Negative for adenopathy. Does not bruise/bleed easily.   Psychiatric/Behavioral:  Negative for confusion and dysphoric mood.     OBJECTIVE:      Vitals:    11/23/22 0933   BP: 120/72   BP Location: Left arm   Patient Position: Sitting   BP Method: Medium (Manual)   Pulse: 74   Resp: 18   Temp: 98.5 °F (36.9 °C)   TempSrc: Oral   SpO2: 97%   Weight: 67 kg (147 lb 11.2 oz)   Height: 5' (1.524 m)     Physical Exam  Vitals and nursing note reviewed.   Constitutional:       General: She is not in acute distress.     Appearance: Normal appearance. She is normal weight. She is not ill-appearing.   Eyes:      General: No scleral icterus.     Pupils: Pupils are equal, round, and reactive to light.   Cardiovascular:      Rate and Rhythm: Normal rate and regular rhythm.   Pulmonary:      Effort: Pulmonary effort is normal.      Breath sounds: Normal breath sounds.   Abdominal:      General: Bowel sounds are normal.      Palpations: Abdomen is soft.      Tenderness: There is no abdominal tenderness.   Musculoskeletal:         General: Normal range of motion.      Cervical back: Normal range of motion and neck supple.   Lymphadenopathy:      Cervical: No cervical adenopathy.   Skin:     General: Skin is warm and dry.      Coloration: Skin is not jaundiced or pale.   Neurological:      Mental Status: She is alert and oriented to person, place, and  time.   Psychiatric:         Mood and Affect: Mood normal. Affect is flat.         Behavior: Behavior normal.      Assessment:       1. Need for pneumococcal vaccination    2. Need for influenza vaccination    3. Positive FIT (fecal immunochemical test)          Plan:       Need for pneumococcal vaccination  -     (In Office Administered) Pneumococcal Conjugate Vaccine (20 Valent) (IM)    Need for influenza vaccination  -     Influenza - Quadrivalent - High Dose (65+) (PF) (IM)    Positive FIT (fecal immunochemical test)  -     Ambulatory referral/consult to Gastroenterology; Future; Expected date: 11/30/2022   -needs f/u with GI     Follow up in about 6 months (around 5/23/2023) for annual .      11/23/2022 MIKAL Mckeon, FNP    This note was created using [employer] voice recognition software that occasionally misinterprets phrases or words.

## 2023-01-13 DIAGNOSIS — G25.81 RLS (RESTLESS LEGS SYNDROME): ICD-10-CM

## 2023-01-13 RX ORDER — ROPINIROLE 4 MG/1
TABLET, FILM COATED ORAL
Qty: 180 TABLET | OUTPATIENT
Start: 2023-01-13

## 2023-01-24 ENCOUNTER — OFFICE VISIT (OUTPATIENT)
Dept: FAMILY MEDICINE | Facility: CLINIC | Age: 73
End: 2023-01-24
Payer: MEDICARE

## 2023-01-24 VITALS
HEART RATE: 68 BPM | SYSTOLIC BLOOD PRESSURE: 110 MMHG | WEIGHT: 145.19 LBS | DIASTOLIC BLOOD PRESSURE: 64 MMHG | RESPIRATION RATE: 20 BRPM | HEIGHT: 60 IN | OXYGEN SATURATION: 98 % | TEMPERATURE: 98 F | BODY MASS INDEX: 28.51 KG/M2

## 2023-01-24 DIAGNOSIS — L03.116 CELLULITIS OF LEFT LOWER EXTREMITY: Primary | ICD-10-CM

## 2023-01-24 DIAGNOSIS — G25.81 RLS (RESTLESS LEGS SYNDROME): ICD-10-CM

## 2023-01-24 PROCEDURE — 1160F PR REVIEW ALL MEDS BY PRESCRIBER/CLIN PHARMACIST DOCUMENTED: ICD-10-PCS | Mod: CPTII,S$GLB,, | Performed by: NURSE PRACTITIONER

## 2023-01-24 PROCEDURE — 1126F PR PAIN SEVERITY QUANTIFIED, NO PAIN PRESENT: ICD-10-PCS | Mod: CPTII,S$GLB,, | Performed by: NURSE PRACTITIONER

## 2023-01-24 PROCEDURE — 1159F MED LIST DOCD IN RCRD: CPT | Mod: CPTII,S$GLB,, | Performed by: NURSE PRACTITIONER

## 2023-01-24 PROCEDURE — 3008F BODY MASS INDEX DOCD: CPT | Mod: CPTII,S$GLB,, | Performed by: NURSE PRACTITIONER

## 2023-01-24 PROCEDURE — 1126F AMNT PAIN NOTED NONE PRSNT: CPT | Mod: CPTII,S$GLB,, | Performed by: NURSE PRACTITIONER

## 2023-01-24 PROCEDURE — 3078F PR MOST RECENT DIASTOLIC BLOOD PRESSURE < 80 MM HG: ICD-10-PCS | Mod: CPTII,S$GLB,, | Performed by: NURSE PRACTITIONER

## 2023-01-24 PROCEDURE — 1160F RVW MEDS BY RX/DR IN RCRD: CPT | Mod: CPTII,S$GLB,, | Performed by: NURSE PRACTITIONER

## 2023-01-24 PROCEDURE — 3074F SYST BP LT 130 MM HG: CPT | Mod: CPTII,S$GLB,, | Performed by: NURSE PRACTITIONER

## 2023-01-24 PROCEDURE — 1101F PR PT FALLS ASSESS DOC 0-1 FALLS W/OUT INJ PAST YR: ICD-10-PCS | Mod: CPTII,S$GLB,, | Performed by: NURSE PRACTITIONER

## 2023-01-24 PROCEDURE — 3078F DIAST BP <80 MM HG: CPT | Mod: CPTII,S$GLB,, | Performed by: NURSE PRACTITIONER

## 2023-01-24 PROCEDURE — 99214 PR OFFICE/OUTPT VISIT, EST, LEVL IV, 30-39 MIN: ICD-10-PCS | Mod: S$GLB,,, | Performed by: NURSE PRACTITIONER

## 2023-01-24 PROCEDURE — 99214 OFFICE O/P EST MOD 30 MIN: CPT | Mod: S$GLB,,, | Performed by: NURSE PRACTITIONER

## 2023-01-24 PROCEDURE — 3288F FALL RISK ASSESSMENT DOCD: CPT | Mod: CPTII,S$GLB,, | Performed by: NURSE PRACTITIONER

## 2023-01-24 PROCEDURE — 1159F PR MEDICATION LIST DOCUMENTED IN MEDICAL RECORD: ICD-10-PCS | Mod: CPTII,S$GLB,, | Performed by: NURSE PRACTITIONER

## 2023-01-24 PROCEDURE — 3288F PR FALLS RISK ASSESSMENT DOCUMENTED: ICD-10-PCS | Mod: CPTII,S$GLB,, | Performed by: NURSE PRACTITIONER

## 2023-01-24 PROCEDURE — 3008F PR BODY MASS INDEX (BMI) DOCUMENTED: ICD-10-PCS | Mod: CPTII,S$GLB,, | Performed by: NURSE PRACTITIONER

## 2023-01-24 PROCEDURE — 1101F PT FALLS ASSESS-DOCD LE1/YR: CPT | Mod: CPTII,S$GLB,, | Performed by: NURSE PRACTITIONER

## 2023-01-24 PROCEDURE — 3074F PR MOST RECENT SYSTOLIC BLOOD PRESSURE < 130 MM HG: ICD-10-PCS | Mod: CPTII,S$GLB,, | Performed by: NURSE PRACTITIONER

## 2023-01-24 RX ORDER — DOXYCYCLINE HYCLATE 100 MG
100 TABLET ORAL 2 TIMES DAILY
Qty: 14 TABLET | Refills: 0 | Status: SHIPPED | OUTPATIENT
Start: 2023-01-24 | End: 2023-01-31

## 2023-01-24 RX ORDER — MUPIROCIN 20 MG/G
OINTMENT TOPICAL 2 TIMES DAILY
Qty: 30 G | Refills: 0 | Status: SHIPPED | OUTPATIENT
Start: 2023-01-24 | End: 2023-11-22

## 2023-01-24 RX ORDER — ROPINIROLE 4 MG/1
TABLET, FILM COATED ORAL
Qty: 51 TABLET | Refills: 0 | Status: SHIPPED | OUTPATIENT
Start: 2023-01-24 | End: 2023-11-22

## 2023-01-24 NOTE — PROGRESS NOTES
"    SUBJECTIVE:      Patient ID: Vesta Cousin is a 72 y.o. female.    Chief Complaint: Wound Check    Mrs. Lemons presents to the clinic for a unhealing wound to left calf. Wound circular in shape, drains on occasion per pt very scant amount of purulent drainage. Pt reports that the wound occurred in her garden 3 months ago and that she has been treating with OTC ointments. Redness is present. Pt denies fever or pain.     Pt voiced concern that she has "tics" and "people think I'm crazy." Pt stated " I have bugs that are always crawling on me.'' Pt denies seeing them at that this time. "I don't really see them anymore. They go under my skin and I feel them." Pt reports that this feeling is located over her entire body for over a year. Reports that she is now showering 2 times a day, washing sheets frequently, has had to move into a spare bedroom away from her , along with vacuuming 3 times a day. PT reports that her  denies seeing or feeling bugs, along with individuals that visit that her home. Pt reports "the bugs go into my sore at time and at night they go into my mouth." She reports that she takes "oil baths and puts oil on my sore at night to get rid of the bugs." PT did bring a piece of clear packing tape with that she believed to have bugs on it, that she took off her skin at home.     Pt has PMH: uncontrolled RLS (on Requip), anxiety and depression (trazodone and Celexa) and uncontrolled chronic lower back pain. Was referred to neurology a few months ago, however has not gone to an appointment.     Wound Check  She was originally treated more than 14 days ago. Prior ED Treatment: Home treatment [facility]. Her temperature was unmeasured prior to arrival. There has been colored (purulent) discharge from the wound. There is new redness present. There is no swelling present. There is no pain present. She has no difficulty moving the affected extremity or digit.     Family History   Problem Relation Age " of Onset    Stroke Mother     Arthritis Father     Heart disease Father 73    Heart disease Brother       Social History     Socioeconomic History    Marital status:     Number of children: 2   Tobacco Use    Smoking status: Never    Smokeless tobacco: Never   Substance and Sexual Activity    Alcohol use: Yes     Comment: socially. luís    Drug use: No    Sexual activity: Yes     Partners: Male   Social History Narrative        2 sons    Works part time at [employer]     Current Outpatient Medications   Medication Sig Dispense Refill    calcium carbonate-vitamin D3 (CALTRATE 600 + D) 600 mg (1,500 mg)-800 unit Chew Take 1 tablet by mouth 2 (two) times daily. 100 tablet 5    citalopram (CELEXA) 40 MG tablet TAKE 1 TABLET EVERY DAY 90 tablet 0    ipratropium (ATROVENT) 0.03 % nasal spray 2 sprays by Nasal route 2 (two) times daily. 30 mL 2    meloxicam (MOBIC) 15 MG tablet TAKE 1 TABLET ONCE DAILY. USE SPARINGLY TO AVOID KIDNEY OR GI DAMAGE. 90 tablet 0    omeprazole (PRILOSEC) 20 MG capsule TAKE 1 CAPSULE IN THE EVENING 90 capsule 1    traZODone (DESYREL) 100 MG tablet TAKE 1 TABLET EVERY NIGHT 90 tablet 0    doxycycline (VIBRA-TABS) 100 MG tablet Take 1 tablet (100 mg total) by mouth 2 (two) times daily. for 7 days 14 tablet 0    mupirocin (BACTROBAN) 2 % ointment Apply topically 2 (two) times daily. 30 g 0    rOPINIRole (REQUIP) 4 MG tablet Take 1.5 tablets (6 mg total) by mouth nightly for 14 days, THEN 1 tablet (4 mg total) nightly. 51 tablet 0     No current facility-administered medications for this visit.     Review of patient's allergies indicates:   Allergen Reactions    Hay fever and allergy relief     Gabapentin Other (See Comments)      Avoid this medication since patient is unaware of this medication and it may be causing some memory issues and confusion.      Past Medical History:   Diagnosis Date    Abdominal pain, acute     Abdominal wall mass of epigastric region     Acid  reflux     Acute bronchitis 10/17/2018    Acute low back pain     Annual physical exam     Anxiety     Bilateral shoulder pain     Body mass index (bmi) 31.0-31.9, adult     Cervical spondylosis     Chronic right shoulder pain     Decreased GFR     Depression     Fall 6/11/2019    Head trauma 6/11/2019    Hearing aid worn     bilateral    History of ulcerative colitis     History of vitamin D deficiency 4/24/2018    HNP (herniated nucleus pulposus), lumbar     Hyperlipidemia     Insomnia     Kidney stone     Left-sided low back pain without sciatica     Non-smoker     Osteoarthritis     Osteoarthritis of left glenohumeral joint     Osteopenia     Osteoporosis screening     Palpable abdominal mass     Peripheral neuropathy     Plantar fasciitis of left foot     RLS (restless legs syndrome)     Screening for colorectal cancer     Tear of right rotator cuff     Visit for screening mammogram      Past Surgical History:   Procedure Laterality Date    CHOLECYSTECTOMY      COLON SURGERY      HEEL SPUR SURGERY      JOINT REPLACEMENT Bilateral     hip    partial colectomy      for ulcerative colitis    SHOULDER SURGERY  05/2016    TONSILLECTOMY      total right hip replacement         Review of Systems   Constitutional:  Negative for activity change, appetite change and fever.   HENT:  Negative for congestion, ear discharge, ear pain, sore throat, trouble swallowing and voice change.    Eyes:  Negative for photophobia, pain, discharge and visual disturbance.   Respiratory:  Negative for cough, chest tightness and shortness of breath.    Cardiovascular:  Negative for chest pain and palpitations.   Gastrointestinal:  Negative for abdominal pain, nausea and vomiting.   Endocrine: Negative for cold intolerance and heat intolerance.   Genitourinary:  Negative for difficulty urinating and dysuria.   Musculoskeletal:  Positive for back pain (lower- uncontrolled). Negative for arthralgias and gait problem.   Skin:  Positive for  wound (left calf). Negative for rash.   Allergic/Immunologic: Negative for immunocompromised state.   Neurological:  Negative for speech difficulty and headaches.   Psychiatric/Behavioral:  Positive for sleep disturbance (uncontrolled RLS). Negative for confusion, self-injury and suicidal ideas. The patient is nervous/anxious.     OBJECTIVE:      Vitals:    01/24/23 0948   BP: 110/64   BP Location: Right arm   Patient Position: Sitting   BP Method: Medium (Manual)   Pulse: 68   Resp: 20   Temp: 98.1 °F (36.7 °C)   TempSrc: Oral   SpO2: 98%   Weight: 65.9 kg (145 lb 3.2 oz)   Height: 5' (1.524 m)     Physical Exam  Vitals and nursing note reviewed.   Constitutional:       General: She is not in acute distress.     Appearance: Normal appearance. She is well-developed. She is not ill-appearing.   HENT:      Head: Normocephalic and atraumatic.      Right Ear: Tympanic membrane, ear canal and external ear normal.      Left Ear: Tympanic membrane, ear canal and external ear normal.      Nose: Nose normal. No congestion.      Mouth/Throat:      Pharynx: Uvula midline. No posterior oropharyngeal erythema.   Eyes:      General: Lids are normal.      Conjunctiva/sclera: Conjunctivae normal.      Pupils: Pupils are equal, round, and reactive to light.      Right eye: Pupil is round and reactive.      Left eye: Pupil is round and reactive.   Neck:      Thyroid: No thyromegaly.      Vascular: No JVD.      Trachea: Trachea normal.   Cardiovascular:      Rate and Rhythm: Normal rate and regular rhythm.      Pulses: Normal pulses.      Heart sounds: Normal heart sounds.   Pulmonary:      Effort: Pulmonary effort is normal. No tachypnea or respiratory distress.      Breath sounds: Normal breath sounds.   Musculoskeletal:         General: Tenderness (lower back) present.      Cervical back: Normal range of motion and neck supple.      Right lower leg: No edema.      Left lower leg: No edema.   Lymphadenopathy:      Cervical: No  "cervical adenopathy.   Skin:     General: Skin is warm and dry.      Findings: Erythema and wound present. No lesion or rash.             Comments: 2 cm x 2 cm induration with redness and very scant amount of purulent drainage - nonhealing x 3 months, minimal redness    Upon exam of tape with "bugs"- After very close evaluation the specks on the tape appear to be dirt; no head/legs seen   Neurological:      Mental Status: She is alert and oriented to person, place, and time.   Psychiatric:         Mood and Affect: Mood is anxious.         Speech: Speech normal.         Behavior: Behavior normal. Behavior is cooperative.         Thought Content: Thought content normal.      Assessment:       1. Cellulitis of left lower extremity    2. RLS (restless legs syndrome)          Plan:       Cellulitis of left lower extremity  -     mupirocin (BACTROBAN) 2 % ointment; Apply topically 2 (two) times daily.  Dispense: 30 g; Refill: 0  -     doxycycline (VIBRA-TABS) 100 MG tablet; Take 1 tablet (100 mg total) by mouth 2 (two) times daily. for 7 days  Dispense: 14 tablet; Refill: 0  -will tx for cellulitis, avoided Bactrim d/t decreased renal function; top and oral abx written, increase clear fluids; take as prescribed with food until completed and RTC for wound check if no improvement or resolution     RLS (restless legs syndrome)  -     rOPINIRole (REQUIP) 4 MG tablet; Take 1.5 tablets (6 mg total) by mouth nightly for 14 days, THEN 1 tablet (4 mg total) nightly.  Dispense: 51 tablet; Refill: 0  -     Ambulatory referral/consult to Neurology; Future; Expected date: 01/31/2023   -recommended slow taper d/t complaints today, concerns for medication SE; taper reviewed- pt needs neuro eval and possible psych if it continues; instructed once daily bathing, lotion for itching and pest control eval if concerns at home       Follow up if symptoms worsen or fail to improve.      1/24/2023 MIKAL Mckeon, FNP    This note was " created using MModal voice recognition software that occasionally misinterprets phrases or words.

## 2023-02-02 ENCOUNTER — TELEPHONE (OUTPATIENT)
Dept: FAMILY MEDICINE | Facility: CLINIC | Age: 73
End: 2023-02-02

## 2023-02-02 NOTE — TELEPHONE ENCOUNTER
Patient called stating that she was done with her antibiotics. She wanted to know if she needed to make an appointment to come back in for more or if some would be called in.  Please advise.

## 2023-02-03 NOTE — TELEPHONE ENCOUNTER
Attempted to call patient received vm. Left message stating no further antibiotics are needed if it is healing she could be referred to  see a dermatologist. Left call back number if she was interested in seeing dermatologist.

## 2023-02-13 ENCOUNTER — PATIENT MESSAGE (OUTPATIENT)
Dept: FAMILY MEDICINE | Facility: CLINIC | Age: 73
End: 2023-02-13

## 2023-02-15 ENCOUNTER — LAB VISIT (OUTPATIENT)
Dept: LAB | Facility: HOSPITAL | Age: 73
End: 2023-02-15
Attending: NURSE PRACTITIONER
Payer: MEDICARE

## 2023-02-15 DIAGNOSIS — G25.81 RESTLESS LEGS: Primary | ICD-10-CM

## 2023-02-15 DIAGNOSIS — G62.9 PERIPHERAL NERVE DISORDER: ICD-10-CM

## 2023-02-15 LAB
ALBUMIN SERPL BCP-MCNC: 4 G/DL (ref 3.5–5.2)
ALP SERPL-CCNC: 80 U/L (ref 55–135)
ALT SERPL W/O P-5'-P-CCNC: 14 U/L (ref 10–44)
ANION GAP SERPL CALC-SCNC: 10 MMOL/L (ref 8–16)
AST SERPL-CCNC: 17 U/L (ref 10–40)
BILIRUB SERPL-MCNC: 0.5 MG/DL (ref 0.1–1)
BUN SERPL-MCNC: 13 MG/DL (ref 8–23)
CALCIUM SERPL-MCNC: 9.2 MG/DL (ref 8.7–10.5)
CHLORIDE SERPL-SCNC: 103 MMOL/L (ref 95–110)
CO2 SERPL-SCNC: 23 MMOL/L (ref 23–29)
CREAT SERPL-MCNC: 0.9 MG/DL (ref 0.5–1.4)
CRP SERPL-MCNC: 0.12 MG/DL
ERYTHROCYTE [SEDIMENTATION RATE] IN BLOOD BY WESTERGREN METHOD: 7 MM/HR (ref 0–20)
EST. GFR  (NO RACE VARIABLE): >60 ML/MIN/1.73 M^2
ESTIMATED AVG GLUCOSE: 131 MG/DL (ref 68–131)
FERRITIN SERPL-MCNC: 96 NG/ML (ref 20–300)
FOLATE SERPL-MCNC: 17.4 NG/ML (ref 4–24)
GLUCOSE SERPL-MCNC: 92 MG/DL (ref 70–110)
HBA1C MFR BLD: 6.2 % (ref 4.5–6.2)
IRON SERPL-MCNC: 80 UG/DL (ref 30–160)
POTASSIUM SERPL-SCNC: 4.2 MMOL/L (ref 3.5–5.1)
PROT SERPL-MCNC: 7.5 G/DL (ref 6–8.4)
SATURATED IRON: 26 % (ref 20–50)
SODIUM SERPL-SCNC: 136 MMOL/L (ref 136–145)
TOTAL IRON BINDING CAPACITY: 308 UG/DL (ref 250–450)
TRANSFERRIN SERPL-MCNC: 220 MG/DL (ref 200–375)
TSH SERPL DL<=0.005 MIU/L-ACNC: 1.59 UIU/ML (ref 0.34–5.6)
VIT B12 SERPL-MCNC: 411 PG/ML (ref 210–950)

## 2023-02-15 PROCEDURE — 86160 COMPLEMENT ANTIGEN: CPT | Mod: 59 | Performed by: NURSE PRACTITIONER

## 2023-02-15 PROCEDURE — 84425 ASSAY OF VITAMIN B-1: CPT | Performed by: NURSE PRACTITIONER

## 2023-02-15 PROCEDURE — 82607 VITAMIN B-12: CPT | Performed by: NURSE PRACTITIONER

## 2023-02-15 PROCEDURE — 84207 ASSAY OF VITAMIN B-6: CPT | Performed by: NURSE PRACTITIONER

## 2023-02-15 PROCEDURE — 84165 PROTEIN E-PHORESIS SERUM: CPT | Performed by: NURSE PRACTITIONER

## 2023-02-15 PROCEDURE — 82746 ASSAY OF FOLIC ACID SERUM: CPT | Performed by: NURSE PRACTITIONER

## 2023-02-15 PROCEDURE — 85651 RBC SED RATE NONAUTOMATED: CPT | Performed by: NURSE PRACTITIONER

## 2023-02-15 PROCEDURE — 84155 ASSAY OF PROTEIN SERUM: CPT | Mod: 59 | Performed by: NURSE PRACTITIONER

## 2023-02-15 PROCEDURE — 86160 COMPLEMENT ANTIGEN: CPT | Performed by: NURSE PRACTITIONER

## 2023-02-15 PROCEDURE — 84466 ASSAY OF TRANSFERRIN: CPT | Performed by: NURSE PRACTITIONER

## 2023-02-15 PROCEDURE — 80053 COMPREHEN METABOLIC PANEL: CPT | Performed by: NURSE PRACTITIONER

## 2023-02-15 PROCEDURE — 83921 ORGANIC ACID SINGLE QUANT: CPT | Performed by: NURSE PRACTITIONER

## 2023-02-15 PROCEDURE — 82728 ASSAY OF FERRITIN: CPT | Performed by: NURSE PRACTITIONER

## 2023-02-15 PROCEDURE — 86803 HEPATITIS C AB TEST: CPT | Performed by: NURSE PRACTITIONER

## 2023-02-15 PROCEDURE — 86038 ANTINUCLEAR ANTIBODIES: CPT | Performed by: NURSE PRACTITIONER

## 2023-02-15 PROCEDURE — 84443 ASSAY THYROID STIM HORMONE: CPT | Performed by: NURSE PRACTITIONER

## 2023-02-15 PROCEDURE — 86140 C-REACTIVE PROTEIN: CPT | Performed by: NURSE PRACTITIONER

## 2023-02-15 PROCEDURE — 83036 HEMOGLOBIN GLYCOSYLATED A1C: CPT | Performed by: NURSE PRACTITIONER

## 2023-02-16 DIAGNOSIS — R44.0 AUDITORY HALLUCINATION: ICD-10-CM

## 2023-02-16 DIAGNOSIS — G45.9 TRANSIENT CEREBRAL ISCHEMIA, UNSPECIFIED TYPE: Primary | ICD-10-CM

## 2023-02-16 DIAGNOSIS — R26.89 BALANCE DISORDER: ICD-10-CM

## 2023-02-16 DIAGNOSIS — R44.2 TACTILE HALLUCINATION: ICD-10-CM

## 2023-02-17 LAB
ALBUMIN SERPL ELPH-MCNC: 3.5 G/DL (ref 2.9–4.4)
ALBUMIN/GLOB SERPL: 1 {RATIO} (ref 0.7–1.7)
ALPHA1 GLOB SERPL ELPH-MCNC: 0.3 G/DL (ref 0–0.4)
ALPHA2 GLOB SERPL ELPH-MCNC: 0.8 G/DL (ref 0.4–1)
ANA TITR SER IF: NEGATIVE {TITER}
B-GLOBULIN SERPL ELPH-MCNC: 1.2 G/DL (ref 0.7–1.3)
C3 SERPL-MCNC: 144 MG/DL (ref 82–167)
C4 SERPL-MCNC: 33 MG/DL (ref 12–38)
GAMMA GLOB SERPL ELPH-MCNC: 1.2 G/DL (ref 0.4–1.8)
GLOBULIN SER CALC-MCNC: 3.4 G/DL (ref 2.2–3.9)
HCV AB S/CO SERPL IA: NON REACTIVE
LABORATORY COMMENT REPORT: NORMAL
M PROTEIN SERPL ELPH-MCNC: NORMAL G/DL
PROT SERPL-MCNC: 6.9 G/DL (ref 6–8.5)

## 2023-02-18 LAB — VIT B1 BLD-SCNC: 123.9 NMOL/L (ref 66.5–200)

## 2023-02-20 LAB — VIT B6 SERPL-MCNC: 6.3 UG/L (ref 3.4–65.2)

## 2023-02-23 LAB — METHLYMALONIC ACID: 194 NMOL/L (ref 0–378)

## 2023-04-19 ENCOUNTER — HOSPITAL ENCOUNTER (OUTPATIENT)
Dept: RADIOLOGY | Facility: HOSPITAL | Age: 73
Discharge: HOME | End: 2023-04-19
Attending: NURSE PRACTITIONER
Payer: MEDICARE

## 2023-04-19 DIAGNOSIS — R26.89 BALANCE DISORDER: ICD-10-CM

## 2023-04-19 DIAGNOSIS — R44.2 TACTILE HALLUCINATION: ICD-10-CM

## 2023-04-19 DIAGNOSIS — G45.9 TRANSIENT CEREBRAL ISCHEMIA, UNSPECIFIED TYPE: ICD-10-CM

## 2023-04-19 DIAGNOSIS — R44.0 AUDITORY HALLUCINATION: ICD-10-CM

## 2023-04-19 PROCEDURE — 70551 MRI BRAIN STEM W/O DYE: CPT | Mod: TC,PO

## 2023-04-19 PROCEDURE — 70544 MR ANGIOGRAPHY HEAD W/O DYE: CPT | Mod: TC,59,PO

## 2023-08-13 DIAGNOSIS — F51.04 PSYCHOPHYSIOLOGICAL INSOMNIA: ICD-10-CM

## 2023-08-14 RX ORDER — TRAZODONE HYDROCHLORIDE 100 MG/1
TABLET ORAL
Qty: 90 TABLET | Refills: 0 | Status: SHIPPED | OUTPATIENT
Start: 2023-08-14 | End: 2024-01-11

## 2023-08-26 DIAGNOSIS — K21.9 GASTROESOPHAGEAL REFLUX DISEASE WITHOUT ESOPHAGITIS: ICD-10-CM

## 2023-08-28 RX ORDER — OMEPRAZOLE 20 MG/1
CAPSULE, DELAYED RELEASE ORAL
Qty: 90 CAPSULE | Refills: 0 | Status: SHIPPED | OUTPATIENT
Start: 2023-08-28 | End: 2023-11-09

## 2023-10-11 ENCOUNTER — PATIENT OUTREACH (OUTPATIENT)
Dept: ADMINISTRATIVE | Facility: HOSPITAL | Age: 73
End: 2023-10-11
Payer: MEDICARE

## 2023-10-11 NOTE — PROGRESS NOTES
Population Health Chart Review & Patient Outreach Details:     Reason for Outreach Encounter:     [x]  Non-Compliant Report   []  Payor Report (Humana, PHN, BCBS, MSSP, MCIP, UHC, etc.)   []  Pre-Visit Chart Review     Updates Requested / Reviewed:     []  Care Everywhere    []     []  External Sources ([facility], [facility], DIS, etc.)   [x]  Care Team Updated    Patient Outreach Method:    []  Telephone Outreach Completed   [] Successful   [] Left Voicemail   [] Unable to Contact (wrong number, no voicemail)  []  [facility] Portal Outreach Sent  []  Letter Outreach Mailed  []  Fax Sent for External Records  [x]  External Records Upload    Health Maintenance Topics Addressed and Outreach Outcomes / Actions Taken:        []      Breast Cancer Screening []  Mammo Scheduled      []  External Records Requested     []  Added Reminder to Complete to Upcoming Primary Care Appt Notes     []  Patient Declined     []  Patient Will Call Back to Schedule     []  Patient Will Schedule with External Provider / Order Routed if Applicable             []       Cervical Cancer Screening []  Pap Scheduled      []  External Records Requested     []  Added Reminder to Complete to Upcoming Primary Care Appt Notes     []  Patient Declined     []  Patient Will Call Back to Schedule     []  Patient Will Schedule with External Provider               []          Colorectal Cancer Screening []  Colonoscopy Case Request or Referral Placed     []  External Records Requested     []  Added Reminder to Complete to Upcoming Primary Care Appt Notes     []  Patient Declined     []  Patient Will Call Back to Schedule     []  Patient Will Schedule with External Provider     []  Fit Kit Mailed (add the SmartPhrase under additional notes)     []  Reminded Patient to Complete Home Test             []      Diabetic Eye Exam []  Eye Camera Scheduled or Optometry Referral Placed     []  External Records Requested     []  Added Reminder to Complete to  Upcoming Primary Care Appt Notes     []  Patient Declined     []  Patient Will Call Back to Schedule     []  Patient Will Schedule with External Provider             []      Blood Pressure Control []  Primary Care Follow Up Visit Scheduled     []  Remote Blood Pressure Reading Captured     []  Added Reminder to Complete to Upcoming Primary Care Appt Notes     []  Patient Declined     []  Patient Will Call Back / Patient Will Send Portal Message with Reading     []  Patient Will Call Back to Schedule Provider Visit             []       HbA1c & Other Labs []  Lab Appt Scheduled for Due Labs     []  Primary Care Follow Up Visit Scheduled      []  Reminded Patient to Complete Home Test     []  Added Reminder to Complete to Upcoming Primary Care Appt Notes     []  Patient Declined     []  Patient Will Call Back to Schedule     []  Patient Will Schedule with External Provider / Order Routed if Applicable           []    Schedule Primary Care Appt []  Primary Care Appt Scheduled     []  Patient Declined     []  Patient Will Call Back to Schedule     []  Pt Established with External Provider & Updated Care Team             []      Medication Adherence []  Primary Care Appointment Scheduled     []  Added Reminder to Upcoming Primary Care Appt Notes     []  Patient Reminded to  Prescription     []  Patient Declined, Provider Notified if Needed     []  Sent Provider Message to Review and/or Add Exclusion to Problem List             []      Osteoporosis Screening []  DXA Appointment Scheduled     []  External Records Requested     []  Added Reminder to Complete to Upcoming Primary Care Appt Notes     []  Patient Declined     []  Patient Will Call Back to Schedule     []  Patient Will Schedule with External Provider / Order Routed if Applicable     Additional Care Coordinator Notes:     External record ( Positive Fit Kit Dated 10-5-2022) hyperlinked in Health Maintenance. Results forwarded to provider, and message  sent.      Further Action Needed If Patient Returns Outreach:

## 2023-10-12 ENCOUNTER — TELEPHONE (OUTPATIENT)
Dept: FAMILY MEDICINE | Facility: CLINIC | Age: 73
End: 2023-10-12

## 2023-10-12 NOTE — PROGRESS NOTES
Pt had positive FOB test last year- please see if she has followed up with GI for further eval; if not, we will need to discuss and give her a refeeral; please make sure she has a visit scheduled

## 2023-10-12 NOTE — TELEPHONE ENCOUNTER
----- Message from KYLE Rome sent at 10/12/2023  8:26 AM CDT -----  Pt had positive FOB test last year- please see if she has followed up with GI for further eval; if not, we will need to discuss and give her a refeeral; please make sure she has a visit scheduled

## 2023-10-12 NOTE — TELEPHONE ENCOUNTER
Called patient to see if she has had visit with GI or needed referral. Received voicemail. Left message with santi back number.

## 2023-10-13 ENCOUNTER — PATIENT MESSAGE (OUTPATIENT)
Dept: ADMINISTRATIVE | Facility: HOSPITAL | Age: 73
End: 2023-10-13
Payer: MEDICARE

## 2023-10-13 ENCOUNTER — PATIENT OUTREACH (OUTPATIENT)
Dept: ADMINISTRATIVE | Facility: HOSPITAL | Age: 73
End: 2023-10-13
Payer: MEDICARE

## 2023-10-13 NOTE — LETTER
October 13, 2023    Vesta Eaton  [address] [address]             [facility]  [address]  [place] [facility] [place] [address]  Phone: [phone] [facility] is committed to your overall health.  To help you get the most out of each of your visits, we will review your information to make sure you are up to date on all of your recommended tests and/or procedures.       Your PCP  Klaudia Galvin FNP   found that you may be due for:     Health Maintenance Due   Topic Date Due    Shingles Vaccine (2 of 3) 12/12/2016    Mammogram  05/13/2023    Influenza Vaccine (1) 09/01/2023    COVID-19 Vaccine (3 - 2023-24 season) 09/01/2023    Colorectal Cancer Screening  10/05/2023      ( Please let us know if you have followed up with a Gastroenterologist for you positive Fit Kit )      If you have had any of the above done at another facility, please let us know where you went so that we can request your record. So that your chart with [facility] will be complete.  If you would like to schedule any of these, please contact us at [phone].      Thank you for choosing [facility] .    Frantz PORTILLO  Clinical Care Coordinator    [facility]

## 2023-10-13 NOTE — PROGRESS NOTES
Population Health Chart Review & Patient Outreach Details:     Reason for Outreach Encounter:     [x]  Non-Compliant Report   []  Payor Report (Humana, PHN, BCBS, MSSP, MCIP, UHC, etc.)   []  Pre-Visit Chart Review     Updates Requested / Reviewed:     []  Care Everywhere    []     []  External Sources ([facility]Corp, Quest, DIS, etc.)   []  Care Team Updated    Patient Outreach Method:    [x]  Telephone Outreach Completed   [] Successful   [] Left Voicemail   [x] Unable to Contact (wrong number, no voicemail) ( Phone number not a working number )  [x]  [facility] Portal Outreach Sent  []  Letter Outreach Mailed  []  Fax Sent for External Records  []  External Records Upload    Health Maintenance Topics Addressed and Outreach Outcomes / Actions Taken:        []      Breast Cancer Screening []  Mammo Scheduled      []  External Records Requested     []  Added Reminder to Complete to Upcoming Primary Care Appt Notes     []  Patient Declined     []  Patient Will Call Back to Schedule     []  Patient Will Schedule with External Provider / Order Routed if Applicable             []       Cervical Cancer Screening []  Pap Scheduled      []  External Records Requested     []  Added Reminder to Complete to Upcoming Primary Care Appt Notes     []  Patient Declined     []  Patient Will Call Back to Schedule     []  Patient Will Schedule with External Provider               [x]          Colorectal Cancer Screening []  Colonoscopy Case Request or Referral Placed     []  External Records Requested     []  Added Reminder to Complete to Upcoming Primary Care Appt Notes     []  Patient Declined     []  Patient Will Call Back to Schedule     []  Patient Will Schedule with External Provider     []  Fit Kit Mailed (add the SmartPhrase under additional notes)     []  Reminded Patient to Complete Home Test             []      Diabetic Eye Exam []  Eye Camera Scheduled or Optometry Referral Placed     []  External Records Requested      []  Added Reminder to Complete to Upcoming Primary Care Appt Notes     []  Patient Declined     []  Patient Will Call Back to Schedule     []  Patient Will Schedule with External Provider             []      Blood Pressure Control []  Primary Care Follow Up Visit Scheduled     []  Remote Blood Pressure Reading Captured     []  Added Reminder to Complete to Upcoming Primary Care Appt Notes     []  Patient Declined     []  Patient Will Call Back / Patient Will Send Portal Message with Reading     []  Patient Will Call Back to Schedule Provider Visit             []       HbA1c & Other Labs []  Lab Appt Scheduled for Due Labs     []  Primary Care Follow Up Visit Scheduled      []  Reminded Patient to Complete Home Test     []  Added Reminder to Complete to Upcoming Primary Care Appt Notes     []  Patient Declined     []  Patient Will Call Back to Schedule     []  Patient Will Schedule with External Provider / Order Routed if Applicable           []    Schedule Primary Care Appt []  Primary Care Appt Scheduled     []  Patient Declined     []  Patient Will Call Back to Schedule     []  Pt Established with External Provider & Updated Care Team             []      Medication Adherence []  Primary Care Appointment Scheduled     []  Added Reminder to Upcoming Primary Care Appt Notes     []  Patient Reminded to  Prescription     []  Patient Declined, Provider Notified if Needed     []  Sent Provider Message to Review and/or Add Exclusion to Problem List             []      Osteoporosis Screening []  DXA Appointment Scheduled     []  External Records Requested     []  Added Reminder to Complete to Upcoming Primary Care Appt Notes     []  Patient Declined     []  Patient Will Call Back to Schedule     []  Patient Will Schedule with External Provider / Order Routed if Applicable     Additional Care Coordinator Notes:    Called pt regarding positive fit kit , pt has a phone number that is not working. Pt portal message  sent and letter mailed out. And staff messages was also sent.     Further Action Needed If Patient Returns Outreach:

## 2023-10-21 DIAGNOSIS — J31.0 NON-ALLERGIC RHINITIS: ICD-10-CM

## 2023-10-21 DIAGNOSIS — M85.89 OSTEOPENIA OF MULTIPLE SITES: ICD-10-CM

## 2023-10-23 RX ORDER — IPRATROPIUM BROMIDE 21 UG/1
2 SPRAY, METERED NASAL 2 TIMES DAILY
Qty: 30 ML | Refills: 2 | OUTPATIENT
Start: 2023-10-23

## 2023-10-23 RX ORDER — CALCIUM CARBONATE/VITAMIN D3 600 MG-20
1 TABLET,CHEWABLE ORAL 2 TIMES DAILY
Qty: 100 TABLET | Refills: 5 | COMMUNITY
Start: 2023-10-23

## 2023-11-09 DIAGNOSIS — K21.9 GASTROESOPHAGEAL REFLUX DISEASE WITHOUT ESOPHAGITIS: ICD-10-CM

## 2023-11-09 RX ORDER — OMEPRAZOLE 20 MG/1
CAPSULE, DELAYED RELEASE ORAL
Qty: 90 CAPSULE | Refills: 0 | Status: SHIPPED | OUTPATIENT
Start: 2023-11-09 | End: 2024-01-22

## 2023-11-22 ENCOUNTER — OFFICE VISIT (OUTPATIENT)
Dept: FAMILY MEDICINE | Facility: CLINIC | Age: 73
End: 2023-11-22
Payer: MEDICARE

## 2023-11-22 VITALS
WEIGHT: 158.81 LBS | DIASTOLIC BLOOD PRESSURE: 75 MMHG | HEIGHT: 60 IN | OXYGEN SATURATION: 99 % | BODY MASS INDEX: 31.18 KG/M2 | SYSTOLIC BLOOD PRESSURE: 128 MMHG | HEART RATE: 73 BPM

## 2023-11-22 DIAGNOSIS — F41.9 ANXIETY: ICD-10-CM

## 2023-11-22 DIAGNOSIS — F45.8 PSYCHOGENIC PRURITUS: Primary | ICD-10-CM

## 2023-11-22 PROCEDURE — 3288F FALL RISK ASSESSMENT DOCD: CPT | Mod: CPTII,S$GLB,,

## 2023-11-22 PROCEDURE — 3008F BODY MASS INDEX DOCD: CPT | Mod: CPTII,S$GLB,,

## 2023-11-22 PROCEDURE — 99213 PR OFFICE/OUTPT VISIT, EST, LEVL III, 20-29 MIN: ICD-10-PCS | Mod: S$GLB,,,

## 2023-11-22 PROCEDURE — 1159F MED LIST DOCD IN RCRD: CPT | Mod: CPTII,S$GLB,,

## 2023-11-22 PROCEDURE — 3078F PR MOST RECENT DIASTOLIC BLOOD PRESSURE < 80 MM HG: ICD-10-PCS | Mod: CPTII,S$GLB,,

## 2023-11-22 PROCEDURE — 3008F PR BODY MASS INDEX (BMI) DOCUMENTED: ICD-10-PCS | Mod: CPTII,S$GLB,,

## 2023-11-22 PROCEDURE — 1126F AMNT PAIN NOTED NONE PRSNT: CPT | Mod: CPTII,S$GLB,,

## 2023-11-22 PROCEDURE — 3044F PR MOST RECENT HEMOGLOBIN A1C LEVEL <7.0%: ICD-10-PCS | Mod: CPTII,S$GLB,,

## 2023-11-22 PROCEDURE — 1101F PT FALLS ASSESS-DOCD LE1/YR: CPT | Mod: CPTII,S$GLB,,

## 2023-11-22 PROCEDURE — 1101F PR PT FALLS ASSESS DOC 0-1 FALLS W/OUT INJ PAST YR: ICD-10-PCS | Mod: CPTII,S$GLB,,

## 2023-11-22 PROCEDURE — 3074F PR MOST RECENT SYSTOLIC BLOOD PRESSURE < 130 MM HG: ICD-10-PCS | Mod: CPTII,S$GLB,,

## 2023-11-22 PROCEDURE — 3074F SYST BP LT 130 MM HG: CPT | Mod: CPTII,S$GLB,,

## 2023-11-22 PROCEDURE — 1126F PR PAIN SEVERITY QUANTIFIED, NO PAIN PRESENT: ICD-10-PCS | Mod: CPTII,S$GLB,,

## 2023-11-22 PROCEDURE — 1159F PR MEDICATION LIST DOCUMENTED IN MEDICAL RECORD: ICD-10-PCS | Mod: CPTII,S$GLB,,

## 2023-11-22 PROCEDURE — 3288F PR FALLS RISK ASSESSMENT DOCUMENTED: ICD-10-PCS | Mod: CPTII,S$GLB,,

## 2023-11-22 PROCEDURE — 99213 OFFICE O/P EST LOW 20 MIN: CPT | Mod: S$GLB,,,

## 2023-11-22 PROCEDURE — 3078F DIAST BP <80 MM HG: CPT | Mod: CPTII,S$GLB,,

## 2023-11-22 PROCEDURE — 3044F HG A1C LEVEL LT 7.0%: CPT | Mod: CPTII,S$GLB,,

## 2023-11-22 RX ORDER — HYDROXYZINE HYDROCHLORIDE 25 MG/1
25 TABLET, FILM COATED ORAL 2 TIMES DAILY PRN
Qty: 30 TABLET | Refills: 0 | Status: SHIPPED | OUTPATIENT
Start: 2023-11-22

## 2023-12-06 ENCOUNTER — TELEPHONE (OUTPATIENT)
Dept: PSYCHIATRY | Facility: CLINIC | Age: 73
End: 2023-12-06
Payer: MEDICARE

## 2023-12-06 NOTE — TELEPHONE ENCOUNTER
Returned call to patient regarding referral placed by primary care.Advised patient that the referral was received and she will be placed on our wait list. Referrals are worked in order as they are received. Current wait is approximately 8-12 months. Advised patient that I will send her a resource list through the portal for places outside of [facility] she could check with. Patient verbalized understanding.

## 2024-01-11 DIAGNOSIS — F51.04 PSYCHOPHYSIOLOGICAL INSOMNIA: ICD-10-CM

## 2024-01-11 RX ORDER — TRAZODONE HYDROCHLORIDE 100 MG/1
TABLET ORAL
Qty: 90 TABLET | Refills: 0 | Status: SHIPPED | OUTPATIENT
Start: 2024-01-11

## 2024-01-22 DIAGNOSIS — K21.9 GASTROESOPHAGEAL REFLUX DISEASE WITHOUT ESOPHAGITIS: ICD-10-CM

## 2024-01-22 RX ORDER — OMEPRAZOLE 20 MG/1
CAPSULE, DELAYED RELEASE ORAL
Qty: 90 CAPSULE | Refills: 0 | Status: SHIPPED | OUTPATIENT
Start: 2024-01-22

## 2024-03-12 ENCOUNTER — PATIENT MESSAGE (OUTPATIENT)
Dept: ADMINISTRATIVE | Facility: HOSPITAL | Age: 74
End: 2024-03-12
Payer: MEDICARE

## 2024-03-26 ENCOUNTER — TELEPHONE (OUTPATIENT)
Dept: FAMILY MEDICINE | Facility: CLINIC | Age: 74
End: 2024-03-26
Payer: MEDICARE

## 2024-03-26 NOTE — TELEPHONE ENCOUNTER
Called patient regarding scheduling an appt. No answer. Message left for return call to [phone] option #3.     Have Your Spot(S) Been Treated In The Past?: has not been treated Hpi Title: Evaluation of a Skin Lesion

## 2024-08-12 ENCOUNTER — PATIENT MESSAGE (OUTPATIENT)
Dept: ADMINISTRATIVE | Facility: HOSPITAL | Age: 74
End: 2024-08-12
Payer: MEDICARE

## 2024-09-10 ENCOUNTER — TELEPHONE (OUTPATIENT)
Dept: FAMILY MEDICINE | Facility: CLINIC | Age: 74
End: 2024-09-10
Payer: MEDICARE

## 2024-09-12 ENCOUNTER — TELEPHONE (OUTPATIENT)
Dept: FAMILY MEDICINE | Facility: CLINIC | Age: 74
End: 2024-09-12
Payer: MEDICARE

## 2024-09-12 NOTE — TELEPHONE ENCOUNTER
----- Message from Lucy Kay sent at 9/12/2024 11:30 AM CDT -----   VM  11:26 needs to reschedule because she has a balance. Can she still come in? Please call her appointment was at 3 today. pt's # [phone]

## 2024-09-24 ENCOUNTER — TELEPHONE (OUTPATIENT)
Dept: FAMILY MEDICINE | Facility: CLINIC | Age: 74
End: 2024-09-24
Payer: MEDICARE

## 2024-09-24 NOTE — TELEPHONE ENCOUNTER
----- Message from Betsey Vera sent at 9/24/2024  1:51 PM CDT -----  Vm- 1:30- pt needs to reschedule her appt   [phone]

## 2024-10-08 ENCOUNTER — TELEPHONE (OUTPATIENT)
Dept: FAMILY MEDICINE | Facility: CLINIC | Age: 74
End: 2024-10-08
Payer: MEDICARE

## 2024-10-08 ENCOUNTER — OFFICE VISIT (OUTPATIENT)
Dept: FAMILY MEDICINE | Facility: CLINIC | Age: 74
End: 2024-10-08
Payer: MEDICARE

## 2024-10-08 VITALS
SYSTOLIC BLOOD PRESSURE: 128 MMHG | WEIGHT: 151.19 LBS | TEMPERATURE: 98 F | BODY MASS INDEX: 29.68 KG/M2 | DIASTOLIC BLOOD PRESSURE: 70 MMHG | HEIGHT: 60 IN | HEART RATE: 72 BPM | RESPIRATION RATE: 18 BRPM

## 2024-10-08 DIAGNOSIS — F41.9 ANXIETY: Primary | ICD-10-CM

## 2024-10-08 DIAGNOSIS — J30.2 SEASONAL ALLERGIES: ICD-10-CM

## 2024-10-08 DIAGNOSIS — F34.1 DYSTHYMIA: ICD-10-CM

## 2024-10-08 DIAGNOSIS — F51.04 PSYCHOPHYSIOLOGICAL INSOMNIA: ICD-10-CM

## 2024-10-08 PROCEDURE — 1101F PT FALLS ASSESS-DOCD LE1/YR: CPT | Mod: CPTII,S$GLB,, | Performed by: NURSE PRACTITIONER

## 2024-10-08 PROCEDURE — 3078F DIAST BP <80 MM HG: CPT | Mod: CPTII,S$GLB,, | Performed by: NURSE PRACTITIONER

## 2024-10-08 PROCEDURE — 99999 PR PBB SHADOW E&M-EST. PATIENT-LVL IV: CPT | Mod: PBBFAC,,, | Performed by: NURSE PRACTITIONER

## 2024-10-08 PROCEDURE — 3074F SYST BP LT 130 MM HG: CPT | Mod: CPTII,S$GLB,, | Performed by: NURSE PRACTITIONER

## 2024-10-08 PROCEDURE — 99214 OFFICE O/P EST MOD 30 MIN: CPT | Mod: S$GLB,,, | Performed by: NURSE PRACTITIONER

## 2024-10-08 PROCEDURE — 1159F MED LIST DOCD IN RCRD: CPT | Mod: CPTII,S$GLB,, | Performed by: NURSE PRACTITIONER

## 2024-10-08 PROCEDURE — 1125F AMNT PAIN NOTED PAIN PRSNT: CPT | Mod: CPTII,S$GLB,, | Performed by: NURSE PRACTITIONER

## 2024-10-08 PROCEDURE — 3288F FALL RISK ASSESSMENT DOCD: CPT | Mod: CPTII,S$GLB,, | Performed by: NURSE PRACTITIONER

## 2024-10-08 RX ORDER — LEVOCETIRIZINE DIHYDROCHLORIDE 5 MG/1
5 TABLET, FILM COATED ORAL NIGHTLY
Qty: 30 TABLET | Refills: 11 | Status: SHIPPED | OUTPATIENT
Start: 2024-10-08 | End: 2025-10-08

## 2024-10-08 RX ORDER — ROPINIROLE 2 MG/1
TABLET, FILM COATED ORAL
COMMUNITY

## 2024-10-08 RX ORDER — TRAZODONE HYDROCHLORIDE 100 MG/1
100 TABLET ORAL NIGHTLY
Qty: 90 TABLET | Refills: 0 | Status: SHIPPED | OUTPATIENT
Start: 2024-10-08

## 2024-10-08 RX ORDER — CYCLOBENZAPRINE HCL 5 MG
TABLET ORAL
COMMUNITY

## 2024-10-08 RX ORDER — CITALOPRAM 40 MG/1
40 TABLET, FILM COATED ORAL DAILY
Qty: 90 TABLET | Refills: 1 | Status: SHIPPED | OUTPATIENT
Start: 2024-10-08

## 2024-10-08 NOTE — TELEPHONE ENCOUNTER
----- Message from Maggie sent at 10/8/2024  8:40 AM CDT -----  Pt needs an appt asap for refills. Please advise. Pt #[phone]

## 2024-10-08 NOTE — PROGRESS NOTES
Patient ID: Vesta Eaton is a 74 y.o. female.    Chief Complaint: Anxiety (75 yo female here c/o panic attack yesterday and requesting refill of meds. KM)    Ms. Eaton presents today for medication refill after what she described as a panic attack. She is an established patient of Dr. Colon. She states she ran out of anxiety medicine several days ago and she became extremely overwhelmed. She denies thoughts of self harm or suicidal ideation. Anxiety has improved significantly and she is not under any distress during this visit. She is requesting referral of insomnia medication and expressed that it is not helpful. She will speak to her PCP about possible medication change at her next visit. She denies any other issues or complaints.     Anxiety  Presents for follow-up visit. Symptoms include excessive worry, insomnia, muscle tension, nervous/anxious behavior and panic. Symptoms occur occasionally. The severity of symptoms is moderate. The patient sleeps 4 hours per night. The quality of sleep is fair. Nighttime awakenings: occasional.         Past Medical History:   Diagnosis Date    Abdominal pain, acute     Abdominal wall mass of epigastric region     Acid reflux     Acute bronchitis 10/17/2018    Acute low back pain     Annual physical exam     Anxiety     Bilateral shoulder pain     Body mass index (bmi) 31.0-31.9, adult     Cervical spondylosis     Chronic right shoulder pain     Decreased GFR     Depression     Fall 6/11/2019    Head trauma 6/11/2019    Hearing aid worn     bilateral    History of ulcerative colitis     History of vitamin D deficiency 4/24/2018    HNP (herniated nucleus pulposus), lumbar     Hyperlipidemia     Insomnia     Kidney stone     Left-sided low back pain without sciatica     Non-smoker     Osteoarthritis     Osteoarthritis of left glenohumeral joint     Osteopenia     Osteoporosis screening     Palpable abdominal mass     Peripheral neuropathy     Plantar fasciitis of left foot      RLS (restless legs syndrome)     Screening for colorectal cancer     Tear of right rotator cuff     Visit for screening mammogram      Past Surgical History:   Procedure Laterality Date    CHOLECYSTECTOMY      COLON SURGERY      HEEL SPUR SURGERY      JOINT REPLACEMENT Bilateral     hip    partial colectomy      for ulcerative colitis    SHOULDER SURGERY  05/2016    TONSILLECTOMY      total right hip replacement           Tobacco History:  reports that she has never smoked. She has never been exposed to tobacco smoke. She has never used smokeless tobacco.      Review of patient's allergies indicates:   Allergen Reactions    Hay fever and allergy relief     Gabapentin Other (See Comments)      Avoid this medication since patient is unaware of this medication and it may be causing some memory issues and confusion.       Current Outpatient Medications:     calcium carbonate-vitamin D3 (CALTRATE 600 PLUS D) 600 mg-20 mcg (800 unit) Chew, Take 1 tablet by mouth 2 (two) times daily., Disp: 100 tablet, Rfl: 5    cyclobenzaprine (FLEXERIL) 5 MG tablet, Take 1 tablet 3 times a day by oral route., Disp: , Rfl:     hydrOXYzine HCL (ATARAX) 25 MG tablet, Take 1 tablet (25 mg total) by mouth 2 (two) times daily as needed for Itching or Anxiety., Disp: 30 tablet, Rfl: 0    ipratropium (ATROVENT) 0.03 % nasal spray, 2 sprays by Nasal route 2 (two) times daily., Disp: 30 mL, Rfl: 2    meloxicam (MOBIC) 15 MG tablet, TAKE 1 TABLET ONCE DAILY. USE SPARINGLY TO AVOID KIDNEY OR GI DAMAGE., Disp: 90 tablet, Rfl: 0    omeprazole (PRILOSEC) 20 MG capsule, TAKE 1 CAPSULE IN THE EVENING (NEEDS VISIT FOR FURTHER REFILLS), Disp: 90 capsule, Rfl: 0    citalopram (CELEXA) 40 MG tablet, Take 1 tablet (40 mg total) by mouth once daily., Disp: 90 tablet, Rfl: 1    levocetirizine (XYZAL) 5 MG tablet, Take 1 tablet (5 mg total) by mouth every evening., Disp: 30 tablet, Rfl: 11    rOPINIRole (REQUIP) 2 MG tablet, Take 1 tablet 3 times a day by oral  route., Disp: , Rfl:     traZODone (DESYREL) 100 MG tablet, Take 1 tablet (100 mg total) by mouth every evening., Disp: 90 tablet, Rfl: 0    Review of Systems   Constitutional:  Positive for fatigue.   Psychiatric/Behavioral:  Positive for dysphoric mood and sleep disturbance. The patient is nervous/anxious and has insomnia.           Objective:      Vitals:    10/08/24 1105   BP: 128/70   Pulse: 72   Resp: 18   Temp: 97.9 °F (36.6 °C)   TempSrc: Oral   Weight: 68.6 kg (151 lb 3.2 oz)   Height: 5' (1.524 m)     Physical Exam  Constitutional:       Appearance: Normal appearance.   Cardiovascular:      Rate and Rhythm: Normal rate and regular rhythm.      Heart sounds: Normal heart sounds.   Pulmonary:      Effort: Pulmonary effort is normal.      Breath sounds: Normal breath sounds.   Neurological:      Mental Status: She is alert and oriented to person, place, and time.   Psychiatric:         Mood and Affect: Affect is flat.           Assessment:       1. Anxiety    2. Psychophysiological insomnia    3. Dysthymia    4. Seasonal allergies           Plan:       Anxiety  -     citalopram (CELEXA) 40 MG tablet; Take 1 tablet (40 mg total) by mouth once daily.  Dispense: 90 tablet; Refill: 1    Psychophysiological insomnia  -     traZODone (DESYREL) 100 MG tablet; Take 1 tablet (100 mg total) by mouth every evening.  Dispense: 90 tablet; Refill: 0    Dysthymia  -     citalopram (CELEXA) 40 MG tablet; Take 1 tablet (40 mg total) by mouth once daily.  Dispense: 90 tablet; Refill: 1    Seasonal allergies  -     levocetirizine (XYZAL) 5 MG tablet; Take 1 tablet (5 mg total) by mouth every evening.  Dispense: 30 tablet; Refill: 11      Follow up if symptoms worsen or fail to improve.        10/8/2024 Gretchen Lee NP    Spent august 30 minutes with patient which involved review of pts medical conditions, labs, medications and with 50% of time face-to-face discussion about medical problems, management and any applicable  changes.

## 2024-10-17 ENCOUNTER — OFFICE VISIT (OUTPATIENT)
Dept: FAMILY MEDICINE | Facility: CLINIC | Age: 74
End: 2024-10-17
Payer: MEDICARE

## 2024-10-17 VITALS
HEART RATE: 85 BPM | WEIGHT: 156 LBS | DIASTOLIC BLOOD PRESSURE: 75 MMHG | BODY MASS INDEX: 30.63 KG/M2 | SYSTOLIC BLOOD PRESSURE: 125 MMHG | HEIGHT: 60 IN

## 2024-10-17 DIAGNOSIS — G25.81 RLS (RESTLESS LEGS SYNDROME): ICD-10-CM

## 2024-10-17 DIAGNOSIS — I67.89 CEREBRAL MICROVASCULAR DISEASE: Chronic | ICD-10-CM

## 2024-10-17 DIAGNOSIS — F51.04 PSYCHOPHYSIOLOGICAL INSOMNIA: Primary | Chronic | ICD-10-CM

## 2024-10-17 DIAGNOSIS — F34.1 DYSTHYMIA: Chronic | ICD-10-CM

## 2024-10-17 DIAGNOSIS — Z78.0 MENOPAUSE: ICD-10-CM

## 2024-10-17 DIAGNOSIS — Z23 NEED FOR INFLUENZA VACCINATION: ICD-10-CM

## 2024-10-17 DIAGNOSIS — F41.9 ANXIETY: ICD-10-CM

## 2024-10-17 PROCEDURE — 99999 PR PBB SHADOW E&M-EST. PATIENT-LVL III: CPT | Mod: PBBFAC,,, | Performed by: INTERNAL MEDICINE

## 2024-10-17 RX ORDER — GABAPENTIN 100 MG/1
200 CAPSULE ORAL NIGHTLY
Qty: 60 CAPSULE | Refills: 5 | Status: SHIPPED | OUTPATIENT
Start: 2024-10-17 | End: 2025-10-17

## 2024-10-17 RX ORDER — TRAZODONE HYDROCHLORIDE 100 MG/1
200 TABLET ORAL NIGHTLY
Qty: 60 TABLET | Refills: 5 | Status: SHIPPED | OUTPATIENT
Start: 2024-10-17

## 2024-10-17 NOTE — PROGRESS NOTES
Subjective:       Patient ID: Vesta Cruzsin is a 74 y.o. female.    Chief Complaint: Insomnia, Restless leg, Depression, and Anxiety      cousin is a 74-year-old female who comes for follow-up.  She was accompanied with the  Mr. Toribio vargas who also speaks intermittently on behalf of her    Currently she is on trazodone 100 mg, other medications include hydroxyzine, cyclobenzaprine, citalopram, ropinirole and levo cetirizine which can all affect the CNS.  Still she was not sleeping.    She  is accompanied with the  today who has [facility] benefits and he states that trazodone works well with gabapentin.  I am willing to give a 1 try at least.  If it does not help her, then I feel that with so many medications I should not be playing around or  experimenting with the with my limited experience with various psychotropic medication and the potential interactions and escalation of dosage is.  Probably she  is better off seeing a psychiatrist at  if not this, perhaps at a later point.     She does intermittently speak about Valium     Insomnia:-    This has been going on for several years.  Can not go to sleep till 7:00 a.m. then gets a couple of hours of sleep which is poor and non restorative.  Awake most of the day.  Patient has limited insight as to why she can not sleep. No sleep apnea.  No caffeine at nighttime.      A psychiatry referral was sent on December 23 and she was advised that it takes about 8-12 months to get an appointment.  She never pursued that.      Extensive neurological workup also has been noted as ordered by neurologist miss Ivania Ramos FNP-C     These include various vitamin levels, inflammatory  markers, infective profiles like RPR which has been mostly unremarkable. I also reviewed MRI of brain and MRI of brain.  It was unremarkable except probably for some early and mild microvascular disease.    Depression  Visit Type: follow-up  Patient presents with the following  symptoms: anhedonia, compulsions, decreased concentration, depressed mood and insomnia.  Patient is not experiencing: suicidal planning, thoughts of death, weight gain and weight loss.  Frequency of symptoms: constantly      Anxiety  Presents for follow-up visit. Symptoms include compulsions, decreased concentration, depressed mood and insomnia. Symptoms occur constantly. The severity of symptoms is moderate. The quality of sleep is poor.           Past Medical History:   Diagnosis Date    Abdominal pain, acute     Abdominal wall mass of epigastric region     Acid reflux     Acute bronchitis 10/17/2018    Acute low back pain     Annual physical exam     Anxiety     Bilateral shoulder pain     Body mass index (bmi) 31.0-31.9, adult     Cervical spondylosis     Chronic right shoulder pain     Decreased GFR     Depression     Fall 6/11/2019    Head trauma 6/11/2019    Hearing aid worn     bilateral    History of ulcerative colitis     History of vitamin D deficiency 4/24/2018    HNP (herniated nucleus pulposus), lumbar     Hyperlipidemia     Insomnia     Kidney stone     Left-sided low back pain without sciatica     Non-smoker     Osteoarthritis     Osteoarthritis of left glenohumeral joint     Osteopenia     Osteoporosis screening     Palpable abdominal mass     Peripheral neuropathy     Plantar fasciitis of left foot     RLS (restless legs syndrome)     Screening for colorectal cancer     Tear of right rotator cuff     Visit for screening mammogram      Social History     Socioeconomic History    Marital status:     Number of children: 2   Tobacco Use    Smoking status: Never     Passive exposure: Never    Smokeless tobacco: Never   Substance and Sexual Activity    Alcohol use: Yes     Comment: sociallyDimas staley    Drug use: No    Sexual activity: Yes     Partners: Male   Social History Narrative        2 sons    Works part time at [employer]     Social Drivers of Health     Financial Resource  Strain: Medium Risk (10/17/2024)    Overall Financial Resource Strain (CARDIA)     Difficulty of Paying Living Expenses: Somewhat hard   Food Insecurity: No Food Insecurity (10/17/2024)    Hunger Vital Sign     Worried About Running Out of Food in the Last Year: Never true     Ran Out of Food in the Last Year: Never true   Transportation Needs: No Transportation Needs (10/17/2024)    TRANSPORTATION NEEDS     Transportation : No   Physical Activity: Inactive (10/17/2024)    Exercise Vital Sign     Days of Exercise per Week: 0 days     Minutes of Exercise per Session: 0 min   Stress: Stress Concern Present (10/17/2024)    [employer] - Occupational Stress Questionnaire     Feeling of Stress : Rather much   Housing Stability: Low Risk  (10/17/2024)    Housing Stability Vital Sign     Unable to Pay for Housing in the Last Year: No     Homeless in the Last Year: No     Past Surgical History:   Procedure Laterality Date    CHOLECYSTECTOMY      COLON SURGERY      HEEL SPUR SURGERY      JOINT REPLACEMENT Bilateral     hip    partial colectomy      for ulcerative colitis    SHOULDER SURGERY  05/2016    TONSILLECTOMY      total right hip replacement       Family History   Problem Relation Name Age of Onset    Stroke Mother      Arthritis Father      Heart disease Father  73    Heart disease Brother         Review of Systems   Constitutional:  Negative for weight gain and weight loss.   Psychiatric/Behavioral:  Positive for decreased concentration and depression. The patient has insomnia.          Objective:      Blood pressure 125/75, pulse 85, height 5' (1.524 m), weight 70.8 kg (156 lb). Body mass index is 30.47 kg/m².  Physical Exam  Vitals and nursing note reviewed.   Constitutional:       General: She is not in acute distress.     Appearance: She is well-developed. She is ill-appearing (Chronically unwell appearing). She is not toxic-appearing or diaphoretic.      Comments: BMI is 30.47    HENT:      Head: Normocephalic and atraumatic.   Eyes:      General: Lids are normal. Lids are everted, no foreign bodies appreciated. No scleral icterus.     Conjunctiva/sclera: Conjunctivae normal.   Neck:      Thyroid: No thyromegaly.      Trachea: Trachea normal.   Cardiovascular:      Rate and Rhythm: Normal rate and regular rhythm.      Pulses: Normal pulses.      Heart sounds: Normal heart sounds, S1 normal and S2 normal.   Pulmonary:      Effort: No respiratory distress.      Breath sounds: Normal breath sounds. No rhonchi.   Abdominal:      General: There is no distension.      Palpations: Abdomen is soft. Abdomen is not rigid.      Tenderness: There is no abdominal tenderness.   Musculoskeletal:         General: No deformity.      Cervical back: Neck supple.      Right lower leg: No edema.      Left lower leg: No edema.   Lymphadenopathy:      Cervical: No cervical adenopathy.   Skin:     General: Skin is warm and dry.      Coloration: Skin is not jaundiced.   Neurological:      Mental Status: She is alert. Mental status is at baseline.      Motor: Tremor present.      Comments: Lack of facial expression - Tremor    Myoclonic jerking at night   Psychiatric:         Attention and Perception: She is inattentive.         Mood and Affect: Affect is blunt.         Behavior: Behavior is cooperative.         Cognition and Memory: Cognition is impaired.      Comments: Rather flat affect. Soft-spoken. Some cognitive issues. Sometimes almost muted voice.  Difficult to follow and comprehend with a muted voice.           Assessment:          Above is serum protein electrophoresis done on 02/15/2023  No visits with results within 3 Month(s) from this visit.   Latest known visit with results is:   Patient Outreach on 10/11/2023   Component Date Value Ref Range Status    Fecal Immunochemical Test (iFOBT) 10/05/2022 Positive (P)   Final     Component      Latest Ref Rng 2/15/2023   Iron      30 - 160 ug/dL 80     Transferrin      200 - 375 mg/dL 220    TIBC      250 - 450 ug/dL 308    Saturated Iron      20 - 50 % 26    Hemoglobin A1C External      4.5 - 6.2 % 6.2    Estimated Avg Glucose      68 - 131 mg/dL 131    Ferritin      20.0 - 300.0 ng/mL 96    C4 Complement      12 - 38 mg/dL 33    TSH      0.340 - 5.600 uIU/mL 1.590    Methlymalonic Acid      0 - 378 nmol/L 194    Complement (C-3)      82 - 167 mg/dL 144    CRP      <0.76 mg/dL 0.12    Vitamin B12      210 - 950 pg/mL 411    Folate      4.0 - 24.0 ng/mL 17.4    EUSEBIO Negative    Hepatitis C Ab      Non Reactive  Non Reactive    Sed Rate      0 - 20 mm/Hr 7    Thiamine      66.5 - 200.0 nmol/L 123.9    Vitamin B6      3.4 - 65.2 ug/L 6.3                  Component Ref Range & Units 1 yr ago  (2/15/23) 2 yr ago  (5/13/22) 3 yr ago  (4/26/21) 6 yr ago  (4/24/18) 7 yr ago  (3/22/17) 19 yr ago  (10/29/04) 20 yr ago  (9/1/04) 20 yr ago  (9/1/04)   Sodium 136 - 145 mmol/L 136 139 141   141 R 141 R    Potassium 3.5 - 5.1 mmol/L 4.2 4.1 4.4 4.4 R 4.3 R 3.6 R 3.7 R    Chloride 95 - 110 mmol/L 103 106 108   102 R 102 R    CO2 23 - 29 mmol/L 23 29 25 25.3 R 27.0 R 26 R 24 R    Glucose 70 - 110 mg/dL 92 97 107   81 R 83 R    BUN 8 - 23 mg/dL 13 25 High  13 23 High  R  33 High  R 25 High  R            FINDINGS:  -MRI of brain done on 02/16/2023 ordered by Neurology  There is mild motion artifact which may decrease sensitivity and specificity.     No abnormal diffusion restriction to suggest an acute infarct, and no abnormal GRE signal intensity to suggest an intracranial bleed. There is no hydrocephalus, herniation or midline shift and the basal/suprasellar cisterns are patent.     Overall there is minimal scattered punctate deep cerebral white matter T2 FLAIR hyperintensity (image 16 and 15), a nonspecific finding in this age group, but one which is most commonly associated with (very minimal) small vessel ischemic disease.     The pituitary gland is top normal in size and  infundibulum is normal. The craniocervical junction is unremarkable. The temporal bones, internal and external meati, and semicircular canals appear normal. The orbital contents are normal. The mastoids and paranasal sinuses are clear.     IMPRESSION:  No acute intracranial process and near normal MRI of the brain especially when accounting for age as above.    Reason: R44.2 R44.0 R26.89 G45.9     TECHNIQUE: 3-D time-of-flight  MR angiography of brain without IV contrast     COMPARISON: None     FINDINGS:     The ISAAC, MCA and PCA are patent. Diminutive right and left P-comm arteries noted. [place] is otherwise within normal limits. The basilar artery is patent. The visualized ICA and vertebral arteries are patent. No aneurysm identified. No gross intracranial abnormality observed.     IMPRESSION:     Unremarkable MRA of the brain.     Electronically signed by:  Jarett Lan DO  4/19/2023 2:49 PM CDT Workstation: [id number]        Exam Ended: 04/19/23 14:24 CDT Last Resulted: 04/19/23 14:49 CDT       1. Psychophysiological insomnia  Comments:  continue with trazodone.   start gabapentin with 100 and then 200 mg after 1 week.  Benefit?  Orders:  -     traZODone (DESYREL) 100 MG tablet; Take 2 tablets (200 mg total) by mouth every evening.  Dispense: 60 tablet; Refill: 5  -     gabapentin (NEURONTIN) 100 MG capsule; Take 2 capsules (200 mg total) by mouth every evening. Start with 1 capsule at bedtime and then increase to 2 after 7 nights.  Dispense: 60 capsule; Refill: 5  -     Ambulatory referral/consult to Psychology; Future; Expected date: 11/17/2024    2. Dysthymia  Comments:  Patient continues on citalopram.  Consideration for Rexulti or Vraylar in future  Orders:  -     Ambulatory referral/consult to Psychology; Future; Expected date: 11/17/2024    3. Anxiety  Comments:  tried hydroxyzine.  Will avoid benzodiazepines like Valium or Xanax    4. RLS (restless legs syndrome)  Comments:  continues with  Requip.  No iron-deficiency.    5. Cerebral microvascular disease  Comments:  as has been noted on MRI of the brain.  Maybe consistent with some cognitive changes    6. Need for influenza vaccination  Comments:  Influenza vaccination given today.  Discussed about shingles vaccine, COVID precautions, RSV and remains in different  Orders:  -     influenza (adjuvanted) (Fluad) 45 mcg/0.5 mL IM vaccine (> or = 66 yo) 0.5 mL      Miss Lemons continues to present with challenges in managing her comorbidities of stress, anxiety, insomnia.  Multiple medications including Requip, citalopram, trazodone, hydroxyzine, Flexeril  has been noted which complicates potential care at least from the primary care physician point of view.      Not much initiative taken by patient herself to find a psychiatrist or get a more professional and capable consultation thus far.      There is a acknowledge shortage of mental health care currently.  I have expressed my limitations in managing a complex interaction of psychotropic or neurological medications     Patient's insight seems to be limited and affect seems to be blunted and I am not sure if it was age-appropriate cognitive changes or perhaps her psych make up.       does tend to have more insight but not much help as he gives his suggestion.         Plan:   Psychophysiological insomnia  Comments:  continue with trazodone.   start gabapentin with 100 and then 200 mg after 1 week.  Benefit?  Orders:  -     traZODone (DESYREL) 100 MG tablet; Take 2 tablets (200 mg total) by mouth every evening.  Dispense: 60 tablet; Refill: 5  -     gabapentin (NEURONTIN) 100 MG capsule; Take 2 capsules (200 mg total) by mouth every evening. Start with 1 capsule at bedtime and then increase to 2 after 7 nights.  Dispense: 60 capsule; Refill: 5  -     Ambulatory referral/consult to Psychology; Future; Expected date: 11/17/2024    Dysthymia  Comments:  Patient continues on citalopram.  Consideration  for Rexulti or Vraylar in future  Orders:  -     Ambulatory referral/consult to Psychology; Future; Expected date: 11/17/2024    Anxiety  Comments:  tried hydroxyzine.  Will avoid benzodiazepines like Valium or Xanax    RLS (restless legs syndrome)  Comments:  continues with Requip.  No iron-deficiency.    Cerebral microvascular disease  Comments:  as has been noted on MRI of the brain.  Maybe consistent with some cognitive changes    Need for influenza vaccination  Comments:  Influenza vaccination given today.  Discussed about shingles vaccine, COVID precautions, RSV and remains in different  Orders:  -     influenza (adjuvanted) (Fluad) 45 mcg/0.5 mL IM vaccine (> or = 64 yo) 0.5 mL     As per patient's 's suggestion, continue trazodone and add a small dose of gabapentin.  The combination of trazodone and gabapentin as per him has had him sleep better and I am willing to give it a try clearly stating it as not a FDA approved method.      I would rather prefer her now seeing a psychiatrist given multiple medications and unresolved issues.  Previously she used to be treated with benzodiazepines about 7 years back before she came under my care.  She speaks about getting back on benzodiazepines but I feel that with my limited psychiatric experience and having the burden of managing her entire gamut of primary care issues, this should be better deferred to the expertise and qualification of psychiatrist given her advancing age and potential side effects some comorbidities unless and until she chooses to go on a palliative side.     If no psych consultation available, consider adding Rexulti or Vraylar.    Previous neurological consultation also reviewed with multiple nodes of assessment including falls, seizure activity, tactile hallucination, auditory hallucination, balanced disorder, TIA, neuropathy, restless legs syndrome.  -Ivania Ramos on 02/15/2023. Extensive workup was essentially unremarkable.  MRI did  show mild microvascular changes in the brain.    Multiple discussions about preventive care measures like shingles vaccine, colorectal screening, DEXA scan has been done 2 which she remains rather agnostic or apathetic.  She states that she was timed out of that which I do not disagree fundamentally subject to the fact that she understands the consequences of declining these measures.      Continue with COVID and flu precautions.      Flu vaccination given today.    Follow up in about 6 months (around 4/17/2025), or if symptoms worsen or fail to improve, for Insomnia/reflux.   Spent august 38 minutes with patient which involved review of pts medical conditions, labs, medications and with 50% of time face-to-face discussion about medical problems, management and any applicable changes.    Current Outpatient Medications:     calcium carbonate-vitamin D3 (CALTRATE 600 PLUS D) 600 mg-20 mcg (800 unit) Chew, Take 1 tablet by mouth 2 (two) times daily., Disp: 100 tablet, Rfl: 5    citalopram (CELEXA) 40 MG tablet, Take 1 tablet (40 mg total) by mouth once daily., Disp: 90 tablet, Rfl: 1    levocetirizine (XYZAL) 5 MG tablet, Take 1 tablet (5 mg total) by mouth every evening., Disp: 30 tablet, Rfl: 11    meloxicam (MOBIC) 15 MG tablet, TAKE 1 TABLET ONCE DAILY. USE SPARINGLY TO AVOID KIDNEY OR GI DAMAGE., Disp: 90 tablet, Rfl: 0    omeprazole (PRILOSEC) 20 MG capsule, TAKE 1 CAPSULE IN THE EVENING (NEEDS VISIT FOR FURTHER REFILLS), Disp: 90 capsule, Rfl: 0    rOPINIRole (REQUIP) 2 MG tablet, Take 1 tablet 3 times a day by oral route., Disp: , Rfl:     gabapentin (NEURONTIN) 100 MG capsule, Take 2 capsules (200 mg total) by mouth every evening. Start with 1 capsule at bedtime and then increase to 2 after 7 nights., Disp: 60 capsule, Rfl: 5    traZODone (DESYREL) 100 MG tablet, Take 2 tablets (200 mg total) by mouth every evening., Disp: 60 tablet, Rfl: 5  No current facility-administered medications for this visit.    Jesus  Darlene

## 2025-01-02 ENCOUNTER — PATIENT MESSAGE (OUTPATIENT)
Dept: PSYCHIATRY | Facility: CLINIC | Age: 75
End: 2025-01-02
Payer: MEDICARE

## 2025-01-02 ENCOUNTER — TELEPHONE (OUTPATIENT)
Dept: PSYCHIATRY | Facility: CLINIC | Age: 75
End: 2025-01-02
Payer: MEDICARE

## 2025-02-03 ENCOUNTER — TELEPHONE (OUTPATIENT)
Dept: PSYCHIATRY | Facility: CLINIC | Age: 75
End: 2025-02-03
Payer: MEDICARE

## 2025-03-24 ENCOUNTER — PATIENT MESSAGE (OUTPATIENT)
Dept: FAMILY MEDICINE | Facility: CLINIC | Age: 75
End: 2025-03-24
Payer: MEDICARE

## 2025-04-02 DIAGNOSIS — F41.9 ANXIETY: ICD-10-CM

## 2025-04-02 DIAGNOSIS — F34.1 DYSTHYMIA: ICD-10-CM

## 2025-04-03 RX ORDER — CITALOPRAM 40 MG/1
40 TABLET, FILM COATED ORAL
Qty: 60 TABLET | Refills: 0 | Status: SHIPPED | OUTPATIENT
Start: 2025-04-03

## 2025-06-14 DIAGNOSIS — F41.9 ANXIETY: ICD-10-CM

## 2025-06-14 DIAGNOSIS — F34.1 DYSTHYMIA: ICD-10-CM

## 2025-06-15 RX ORDER — CITALOPRAM 40 MG/1
40 TABLET ORAL DAILY
Qty: 30 TABLET | Refills: 1 | Status: SHIPPED | OUTPATIENT
Start: 2025-06-15 | End: 2025-08-14

## 2025-06-15 NOTE — TELEPHONE ENCOUNTER
No care due was identified.  [facility] Embedded Care Due Messages. Reference number: [id number].   6/14/2025 9:53:01 PM CDT

## 2025-06-15 NOTE — TELEPHONE ENCOUNTER
Refill Routing Note   Medication(s) are not appropriate for processing by [facility] for the following reason(s):        New or recently adjusted medication    [facility] action(s):  Defer        Medication Therapy Plan: A 90 DAY SUPPLY HAS NOT BEEN PRESCRIBED BY THE PCP      Appointments  past 12m or future 3m with PCP    Date Provider   Last Visit   10/17/2024 Jesus Colon MD   Next Visit   Visit date not found Jesus Colon MD   ED visits in past 90 days: 0        Note composed:10:29 PM 06/14/2025

## 2025-08-08 DIAGNOSIS — F41.9 ANXIETY: ICD-10-CM

## 2025-08-08 DIAGNOSIS — F34.1 DYSTHYMIA: ICD-10-CM

## 2025-08-10 RX ORDER — CITALOPRAM 40 MG/1
40 TABLET ORAL DAILY
Qty: 30 TABLET | Refills: 0 | Status: SHIPPED | OUTPATIENT
Start: 2025-08-10